# Patient Record
Sex: MALE | Race: WHITE | NOT HISPANIC OR LATINO | Employment: UNEMPLOYED | URBAN - METROPOLITAN AREA
[De-identification: names, ages, dates, MRNs, and addresses within clinical notes are randomized per-mention and may not be internally consistent; named-entity substitution may affect disease eponyms.]

---
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
## 2018-01-02 ENCOUNTER — TELEPHONE (OUTPATIENT)
Dept: SURGERY | Facility: CLINIC | Age: 18
End: 2018-01-02

## 2018-01-02 ENCOUNTER — DOCUMENTATION ONLY (OUTPATIENT)
Dept: REHABILITATION | Facility: HOSPITAL | Age: 18
End: 2018-01-02

## 2018-01-02 ENCOUNTER — ANESTHESIA EVENT (OUTPATIENT)
Dept: SURGERY | Facility: HOSPITAL | Age: 18
End: 2018-01-02
Payer: COMMERCIAL

## 2018-01-02 NOTE — PT/OT/SLP DISCHARGE
Physician: Dr. Ponce  Diagnosis: L shoulder stiffness         Orders:  Physical Therapy evaluate and treat  Date of eval: 11/28/2017     Pt only attended 1 visit, did not r/t additional visits due to other med issues. Unable to assess if goals met.     Functional Limitations Reports - G Codes  Category: carrying, moving, and handling objects  Tool: FOTO  Score: 47  Score interpretation is as follows:     Goal/ : 27  Discharge/ :  53        TREATMENT:  No group exercise performed.  Pt was provided with a written copy of exercises to perform as tolerated at home.  Pt performed rom and strengthening ex for neck/ L shoulder  including:      Manual cerv traction 4 min   scap ret 5x ( ant shoulder pain)  Prone ext 10x "  Prone ha 10x "  Prone rows "  Sl er 10x "  Mr sleeper 20 sec x 3  Pendulum hangs 1 min     Ther ex time: 15 min  Exercises were reviewed and pt was able to demonstrate them prior to the end of the session.      Modalities:  Pt signed written consent to dry needling Rx.  Pt gave verbal consent for DN.    DN Time: 10 min  Pt rec'd dry needling to B  C 3 - 1 in  L upper trap 1 in  X 2  L supraspinatus 1 in   L post shoulder 1 in  L pec 1/2 in  ( ant axilla)  L subscap 1/2 in      needles for range of motion, decrease pain with no adverse effects.     Pt   was provided educational information, including: co 10 min prn, ex as keven  HEP- pendulum  Hangs, mr sleeper stretch, prone ha as keven         GOALS:  12_   weeks.   1. Independent with HEP.  2. Report decreased   L  shoulder   pain  <   / =  3  /10 with adls such as playing basketball, getting dressed, driving  3. Increased MMT  for  L UE to 5/5    4. Increased arom  for  L shoulder abd/ flex to > 130  5. Improved shoulder FOTO score carrying score to 27%

## 2018-01-02 NOTE — TELEPHONE ENCOUNTER
Informed pt's mother of sx arrival time wash with antibacterial soap and too arrive on the 2nd floor day of sx center

## 2018-01-02 NOTE — ANESTHESIA PREPROCEDURE EVALUATION
01/02/2018  Pre-operative evaluation for Procedure(s) (LRB):  CHOLECYSTECTOMY-LAPAROSCOPIC (N/A)    Arian Farris is a 17 y.o. male with no significant PMH who is scheduled for above procedure. He's been having RUQ pain and 10 pound weight loss.     Prev airway: None on file    Patient Active Problem List   Diagnosis    Left shoulder pain    Right upper quadrant pain    Abdominal pain       Review of patient's allergies indicates:  No Known Allergies     No current facility-administered medications on file prior to encounter.      No current outpatient prescriptions on file prior to encounter.       Past Surgical History:   Procedure Laterality Date    ADENOIDECTOMY      TONSILLECTOMY         Social History     Social History    Marital status: Single     Spouse name: N/A    Number of children: N/A    Years of education: N/A     Occupational History    Not on file.     Social History Main Topics    Smoking status: Passive Smoke Exposure - Never Smoker    Smokeless tobacco: Never Used      Comment: Dad smokes outside    Alcohol use No    Drug use: No    Sexual activity: Not on file     Other Topics Concern    Not on file     Social History Narrative    In the 10th grade, doing well         Vital Signs Range (Last 24H):         CBC: No results for input(s): WBC, RBC, HGB, HCT, PLT, MCV, MCH, MCHC in the last 72 hours.    CMP: No results for input(s): NA, K, CL, CO2, BUN, CREATININE, GLU, MG, PHOS, CALCIUM, ALBUMIN, PROT, ALKPHOS, ALT, AST, BILITOT in the last 72 hours.    INR  No results for input(s): PT, INR, PROTIME, APTT in the last 72 hours.      EKG: None        2D Echo: None          Anesthesia Evaluation         Review of Systems  Anesthesia Hx:  No problems with previous Anesthesia   Social:  Non-Smoker, Alcohol Use    Hematology/Oncology:  Hematology Normal   Oncology Normal      Cardiovascular:  Cardiovascular Normal     Pulmonary:  Pulmonary Normal  Denies COPD.  Denies Asthma.  Denies Shortness of breath.    Renal/:   Denies Chronic Renal Disease.     Hepatic/GI:   Denies Liver Disease.    Neurological:   Denies Seizures.    Endocrine:   Denies Diabetes.        Physical Exam  General:  Well nourished    Airway/Jaw/Neck:  Airway Findings: Mouth Opening: Normal Tongue: Normal  General Airway Assessment: Adult, Average  Mallampati: II  TM Distance: Normal, at least 6 cm  Jaw/Neck Findings:  Neck ROM: Normal ROM       Chest/Lungs:  Chest/Lungs Findings: Clear to auscultation     Heart/Vascular:  Heart Findings: Rate: Normal  Rhythm: Regular Rhythm  Sounds: Normal        Mental Status:  Mental Status Findings:  Cooperative, Alert and Oriented         Anesthesia Plan  Type of Anesthesia, risks & benefits discussed:  Anesthesia Type:  general  Patient's Preference:   Intra-op Monitoring Plan:   Intra-op Monitoring Plan Comments:   Post Op Pain Control Plan:   Post Op Pain Control Plan Comments: As per surgeon's plan  Induction:   IV  Beta Blocker:  Patient is not currently on a Beta-Blocker (No further documentation required).       Informed Consent: Patient understands risks and agrees with Anesthesia plan.  Questions answered. Anesthesia consent signed with patient.  ASA Score: 1     Day of Surgery Review of History & Physical:    H&P update referred to the surgeon.         Ready For Surgery From Anesthesia Perspective.

## 2018-01-03 ENCOUNTER — ANESTHESIA (OUTPATIENT)
Dept: SURGERY | Facility: HOSPITAL | Age: 18
End: 2018-01-03
Payer: COMMERCIAL

## 2018-01-03 ENCOUNTER — SURGERY (OUTPATIENT)
Age: 18
End: 2018-01-03

## 2018-01-03 ENCOUNTER — HOSPITAL ENCOUNTER (OUTPATIENT)
Facility: HOSPITAL | Age: 18
Discharge: HOME | End: 2018-01-03
Attending: SURGERY | Admitting: SURGERY
Payer: COMMERCIAL

## 2018-01-03 VITALS
BODY MASS INDEX: 20.3 KG/M2 | HEIGHT: 71 IN | TEMPERATURE: 99 F | HEART RATE: 78 BPM | DIASTOLIC BLOOD PRESSURE: 77 MMHG | SYSTOLIC BLOOD PRESSURE: 118 MMHG | WEIGHT: 145 LBS | RESPIRATION RATE: 18 BRPM | OXYGEN SATURATION: 100 %

## 2018-01-03 DIAGNOSIS — R10.11 RIGHT UPPER QUADRANT PAIN: Primary | ICD-10-CM

## 2018-01-03 DIAGNOSIS — K80.20 GALLSTONES: ICD-10-CM

## 2018-01-03 DIAGNOSIS — R10.11 RUQ ABDOMINAL PAIN: Primary | ICD-10-CM

## 2018-01-03 PROCEDURE — 25000003 PHARM REV CODE 250: Performed by: STUDENT IN AN ORGANIZED HEALTH CARE EDUCATION/TRAINING PROGRAM

## 2018-01-03 PROCEDURE — 25000003 PHARM REV CODE 250: Performed by: ANESTHESIOLOGY

## 2018-01-03 PROCEDURE — 63600175 PHARM REV CODE 636 W HCPCS: Performed by: SURGERY

## 2018-01-03 PROCEDURE — 71000044 HC DOSC ROUTINE RECOVERY FIRST HOUR: Performed by: SURGERY

## 2018-01-03 PROCEDURE — 27201423 OPTIME MED/SURG SUP & DEVICES STERILE SUPPLY: Performed by: SURGERY

## 2018-01-03 PROCEDURE — 71000015 HC POSTOP RECOV 1ST HR: Performed by: SURGERY

## 2018-01-03 PROCEDURE — D9220A PRA ANESTHESIA: Mod: CRNA,,, | Performed by: NURSE ANESTHETIST, CERTIFIED REGISTERED

## 2018-01-03 PROCEDURE — 63600175 PHARM REV CODE 636 W HCPCS: Performed by: NURSE ANESTHETIST, CERTIFIED REGISTERED

## 2018-01-03 PROCEDURE — 36000709 HC OR TIME LEV III EA ADD 15 MIN: Performed by: SURGERY

## 2018-01-03 PROCEDURE — 37000008 HC ANESTHESIA 1ST 15 MINUTES: Performed by: SURGERY

## 2018-01-03 PROCEDURE — 25000003 PHARM REV CODE 250: Performed by: SURGERY

## 2018-01-03 PROCEDURE — 25000003 PHARM REV CODE 250: Performed by: NURSE ANESTHETIST, CERTIFIED REGISTERED

## 2018-01-03 PROCEDURE — 47562 LAPAROSCOPIC CHOLECYSTECTOMY: CPT | Mod: ,,, | Performed by: SURGERY

## 2018-01-03 PROCEDURE — 88304 TISSUE EXAM BY PATHOLOGIST: CPT | Mod: 26,,,

## 2018-01-03 PROCEDURE — 36000708 HC OR TIME LEV III 1ST 15 MIN: Performed by: SURGERY

## 2018-01-03 PROCEDURE — D9220A PRA ANESTHESIA: Mod: ANES,,, | Performed by: ANESTHESIOLOGY

## 2018-01-03 PROCEDURE — 37000009 HC ANESTHESIA EA ADD 15 MINS: Performed by: SURGERY

## 2018-01-03 PROCEDURE — 88304 TISSUE EXAM BY PATHOLOGIST: CPT

## 2018-01-03 RX ORDER — HYDROCODONE BITARTRATE AND ACETAMINOPHEN 5; 325 MG/1; MG/1
1 TABLET ORAL EVERY 4 HOURS PRN
Status: DISCONTINUED | OUTPATIENT
Start: 2018-01-03 | End: 2018-01-03 | Stop reason: HOSPADM

## 2018-01-03 RX ORDER — NEOSTIGMINE METHYLSULFATE 1 MG/ML
INJECTION, SOLUTION INTRAVENOUS
Status: DISCONTINUED | OUTPATIENT
Start: 2018-01-03 | End: 2018-01-03

## 2018-01-03 RX ORDER — SODIUM CHLORIDE 0.9 % (FLUSH) 0.9 %
3 SYRINGE (ML) INJECTION
Status: DISCONTINUED | OUTPATIENT
Start: 2018-01-03 | End: 2018-01-03 | Stop reason: HOSPADM

## 2018-01-03 RX ORDER — BUPIVACAINE HYDROCHLORIDE 2.5 MG/ML
INJECTION, SOLUTION EPIDURAL; INFILTRATION; INTRACAUDAL
Status: DISCONTINUED | OUTPATIENT
Start: 2018-01-03 | End: 2018-01-03 | Stop reason: HOSPADM

## 2018-01-03 RX ORDER — FENTANYL CITRATE 50 UG/ML
INJECTION, SOLUTION INTRAMUSCULAR; INTRAVENOUS
Status: DISCONTINUED | OUTPATIENT
Start: 2018-01-03 | End: 2018-01-03

## 2018-01-03 RX ORDER — ONDANSETRON 8 MG/1
8 TABLET, ORALLY DISINTEGRATING ORAL ONCE
Status: COMPLETED | OUTPATIENT
Start: 2018-01-03 | End: 2018-01-03

## 2018-01-03 RX ORDER — KETOROLAC TROMETHAMINE 30 MG/ML
INJECTION, SOLUTION INTRAMUSCULAR; INTRAVENOUS
Status: DISCONTINUED | OUTPATIENT
Start: 2018-01-03 | End: 2018-01-03

## 2018-01-03 RX ORDER — MIDAZOLAM HYDROCHLORIDE 1 MG/ML
INJECTION, SOLUTION INTRAMUSCULAR; INTRAVENOUS
Status: DISCONTINUED | OUTPATIENT
Start: 2018-01-03 | End: 2018-01-03

## 2018-01-03 RX ORDER — PROPOFOL 10 MG/ML
VIAL (ML) INTRAVENOUS
Status: DISCONTINUED | OUTPATIENT
Start: 2018-01-03 | End: 2018-01-03

## 2018-01-03 RX ORDER — MEPERIDINE HYDROCHLORIDE 50 MG/ML
12.5 INJECTION INTRAMUSCULAR; INTRAVENOUS; SUBCUTANEOUS EVERY 10 MIN PRN
Status: DISCONTINUED | OUTPATIENT
Start: 2018-01-03 | End: 2018-01-03 | Stop reason: HOSPADM

## 2018-01-03 RX ORDER — HYDROCODONE BITARTRATE AND ACETAMINOPHEN 5; 325 MG/1; MG/1
1 TABLET ORAL EVERY 6 HOURS PRN
Qty: 28 TABLET | Refills: 0 | Status: SHIPPED | OUTPATIENT
Start: 2018-01-03 | End: 2018-01-22

## 2018-01-03 RX ORDER — LIDOCAINE HCL/PF 100 MG/5ML
SYRINGE (ML) INTRAVENOUS
Status: DISCONTINUED | OUTPATIENT
Start: 2018-01-03 | End: 2018-01-03

## 2018-01-03 RX ORDER — ONDANSETRON 2 MG/ML
4 INJECTION INTRAMUSCULAR; INTRAVENOUS EVERY 12 HOURS PRN
Status: DISCONTINUED | OUTPATIENT
Start: 2018-01-03 | End: 2018-01-03 | Stop reason: HOSPADM

## 2018-01-03 RX ORDER — ROCURONIUM BROMIDE 10 MG/ML
INJECTION, SOLUTION INTRAVENOUS
Status: DISCONTINUED | OUTPATIENT
Start: 2018-01-03 | End: 2018-01-03

## 2018-01-03 RX ORDER — ONDANSETRON 2 MG/ML
INJECTION INTRAMUSCULAR; INTRAVENOUS
Status: DISCONTINUED | OUTPATIENT
Start: 2018-01-03 | End: 2018-01-03

## 2018-01-03 RX ORDER — CEFAZOLIN SODIUM 1 G/3ML
2 INJECTION, POWDER, FOR SOLUTION INTRAMUSCULAR; INTRAVENOUS
Status: COMPLETED | OUTPATIENT
Start: 2018-01-03 | End: 2018-01-03

## 2018-01-03 RX ORDER — ACETAMINOPHEN 10 MG/ML
INJECTION, SOLUTION INTRAVENOUS
Status: DISCONTINUED | OUTPATIENT
Start: 2018-01-03 | End: 2018-01-03

## 2018-01-03 RX ORDER — SODIUM CHLORIDE 9 MG/ML
INJECTION, SOLUTION INTRAVENOUS CONTINUOUS
Status: DISCONTINUED | OUTPATIENT
Start: 2018-01-03 | End: 2018-01-03 | Stop reason: HOSPADM

## 2018-01-03 RX ORDER — HYDROMORPHONE HYDROCHLORIDE 2 MG/ML
0.2 INJECTION, SOLUTION INTRAMUSCULAR; INTRAVENOUS; SUBCUTANEOUS EVERY 5 MIN PRN
Status: DISCONTINUED | OUTPATIENT
Start: 2018-01-03 | End: 2018-01-03 | Stop reason: HOSPADM

## 2018-01-03 RX ORDER — GLYCOPYRROLATE 0.2 MG/ML
INJECTION INTRAMUSCULAR; INTRAVENOUS
Status: DISCONTINUED | OUTPATIENT
Start: 2018-01-03 | End: 2018-01-03

## 2018-01-03 RX ADMIN — NEOSTIGMINE METHYLSULFATE 4 MG: 1 INJECTION INTRAVENOUS at 01:01

## 2018-01-03 RX ADMIN — KETOROLAC TROMETHAMINE 30 MG: 30 INJECTION, SOLUTION INTRAMUSCULAR; INTRAVENOUS at 01:01

## 2018-01-03 RX ADMIN — LIDOCAINE HYDROCHLORIDE 100 MG: 20 INJECTION, SOLUTION INTRAVENOUS at 12:01

## 2018-01-03 RX ADMIN — ACETAMINOPHEN 1000 MG: 10 INJECTION, SOLUTION INTRAVENOUS at 01:01

## 2018-01-03 RX ADMIN — GLYCOPYRROLATE 0.4 MG: 0.2 INJECTION, SOLUTION INTRAMUSCULAR; INTRAVENOUS at 01:01

## 2018-01-03 RX ADMIN — ONDANSETRON 8 MG: 8 TABLET, ORALLY DISINTEGRATING ORAL at 03:01

## 2018-01-03 RX ADMIN — HYDROCODONE BITARTRATE AND ACETAMINOPHEN 1 TABLET: 5; 325 TABLET ORAL at 02:01

## 2018-01-03 RX ADMIN — BUPIVACAINE HYDROCHLORIDE 6 ML: 2.5 INJECTION, SOLUTION EPIDURAL; INFILTRATION; INTRACAUDAL; PERINEURAL at 01:01

## 2018-01-03 RX ADMIN — MIDAZOLAM HYDROCHLORIDE 2 MG: 1 INJECTION, SOLUTION INTRAMUSCULAR; INTRAVENOUS at 12:01

## 2018-01-03 RX ADMIN — SODIUM CHLORIDE, SODIUM GLUCONATE, SODIUM ACETATE, POTASSIUM CHLORIDE, MAGNESIUM CHLORIDE, SODIUM PHOSPHATE, DIBASIC, AND POTASSIUM PHOSPHATE: .53; .5; .37; .037; .03; .012; .00082 INJECTION, SOLUTION INTRAVENOUS at 01:01

## 2018-01-03 RX ADMIN — FENTANYL CITRATE 100 MCG: 50 INJECTION, SOLUTION INTRAMUSCULAR; INTRAVENOUS at 12:01

## 2018-01-03 RX ADMIN — PROPOFOL 200 MG: 10 INJECTION, EMULSION INTRAVENOUS at 12:01

## 2018-01-03 RX ADMIN — SODIUM CHLORIDE: 0.9 INJECTION, SOLUTION INTRAVENOUS at 12:01

## 2018-01-03 RX ADMIN — ONDANSETRON 4 MG: 2 INJECTION INTRAMUSCULAR; INTRAVENOUS at 01:01

## 2018-01-03 RX ADMIN — CEFAZOLIN 2 G: 330 INJECTION, POWDER, FOR SOLUTION INTRAMUSCULAR; INTRAVENOUS at 12:01

## 2018-01-03 RX ADMIN — ROCURONIUM BROMIDE 35 MG: 10 INJECTION, SOLUTION INTRAVENOUS at 12:01

## 2018-01-03 NOTE — TRANSFER OF CARE
"Anesthesia Transfer of Care Note    Patient: Arian Farris    Procedure(s) Performed: Procedure(s) (LRB):  CHOLECYSTECTOMY-LAPAROSCOPIC (N/A)    Patient location: PACU    Anesthesia Type: general    Transport from OR: Transported from OR on 6-10 L/min O2 by face mask with adequate spontaneous ventilation    Post pain: adequate analgesia    Post assessment: no apparent anesthetic complications    Post vital signs: stable    Level of consciousness: awake    Nausea/Vomiting: no nausea/vomiting    Complications: none    Transfer of care protocol was followed      Last vitals:   Visit Vitals  /78   Pulse 60   Temp 36.8 °C (98.2 °F) (Skin)   Resp 18   Ht 5' 11" (1.803 m)   Wt 65.8 kg (145 lb)   SpO2 100%   BMI 20.22 kg/m²     "

## 2018-01-03 NOTE — INTERVAL H&P NOTE
The patient has been examined and the H&P has been reviewed:    I concur with the findings and no changes have occurred since H&P was written.     No u/s but will continue with lap michelle based on the relatively low cckhida.    Anesthesia/Surgery risks, benefits and alternative options discussed and understood by patient/family.          There are no hospital problems to display for this patient.

## 2018-01-03 NOTE — BRIEF OP NOTE
Operative Note       Surgery Date: 1/3/2018     Surgeon(s) and Role:     * Elio Lam MD - Primary     * Bruce Wasserman MD - Resident - Assisting    Pre-op Diagnosis:  Biliary colic [K80.50]    Post-op Diagnosis:  Biliary colic [K80.50]    Procedure(s) (LRB):  CHOLECYSTECTOMY-LAPAROSCOPIC (N/A)    Anesthesia: General    Procedure in Detail/Findings:  [place] without apparent complication, unopened    Estimated Blood Loss: Minimal           Specimens     Start     Ordered    01/03/18 1317  Specimen to Pathology - Surgery  Once      01/03/18 1317        Implants: * No implants in log *           Disposition: PACU - hemodynamically stable.           Condition: Good    Attestation:  I was present and scrubbed for the entire procedure.

## 2018-01-03 NOTE — PROGRESS NOTES
Patient AAOx4, VSS, family at bedside. Mother taking all belongings with her. Patient denies pain or nausea at this time.

## 2018-01-03 NOTE — PLAN OF CARE
Patient and parents state they are ready to be discharged. Instructions and narcotic prescription given to patient and family. Both verbalize understanding. Patient tolerating po liquids with no difficulty. Patient states pain is at a tolerable level for them. Anesthesia consent and surgical consent in chart upon patient's discharge from [facility].

## 2018-01-03 NOTE — H&P (VIEW-ONLY)
History & Physical    SUBJECTIVE:     History of Present Illness:  Patient is a 17 y.o. male presents with right upper quadrant pain.  The pain started December 2 in the ruq, was sharp/stabbing and non-radiating with no inciting event and was associated with nausea and vomiting.  He went to the ER at that time.  It was improved with GI cocktail.  Since then he has been pain free but has bloating and due to that has not eaten much and has lost 10 pounds.      Chief Complaint   Patient presents with    Gall Bladder Problem       Review of patient's allergies indicates:  No Known Allergies    Current Outpatient Prescriptions   Medication Sig Dispense Refill    meloxicam (MOBIC) 15 MG tablet Take 1 tablet (15 mg total) by mouth once daily. 30 tablet 0    omeprazole (PRILOSEC OTC) 20 MG tablet Take 1 tablet (20 mg total) by mouth once daily. 60 tablet 1    omeprazole (PRILOSEC) 40 MG capsule Take 1 capsule (40 mg total) by mouth once daily. 14 capsule 0     No current facility-administered medications for this visit.        Past Medical History:   Diagnosis Date    Abdominal migraine     on Topomax for about one year    Eczema     Migraines      Past Surgical History:   Procedure Laterality Date    ADENOIDECTOMY      TONSILLECTOMY       Family History   Problem Relation Age of Onset    Other Mother      Hypoglycemia    Heart disease Mother      Mitral valve prolapse    Migraines Mother     Migraines Sister     Hypertension Maternal Grandmother     Diabetes Maternal Grandmother     Migraines Maternal Grandmother     Hypertension Maternal Grandfather     Diabetes Maternal Grandfather     Hypertension Paternal Grandmother     Diabetes Paternal Grandmother     Hypertension Paternal Grandfather     Diabetes Paternal Grandfather     Heart disease Paternal Grandfather     Hyperlipidemia Paternal Grandfather     Short stature Neg Hx     Thyroid disease Neg Hx      Social History   Substance Use Topics  "   Smoking status: Passive Smoke Exposure - Never Smoker    Smokeless tobacco: Never Used      Comment: Dad smokes outside    Alcohol use No        Review of Systems:  Review of Systems   Constitutional: Positive for unexpected weight change. Negative for fever.   Respiratory: Negative for cough, chest tightness and shortness of breath.    Cardiovascular: Negative for chest pain.   Gastrointestinal: Negative for constipation and diarrhea.   Genitourinary: Negative for difficulty urinating and dysuria.   Skin: Negative for rash and wound.   Neurological: Negative for seizures and headaches.   Hematological: Does not bruise/bleed easily.       OBJECTIVE:     Vital Signs (Most Recent)  Temp: 98.5 °F (36.9 °C) (12/19/17 0742)  Pulse: 80 (12/19/17 0742)  BP: (!) 146/75 (12/19/17 0742)  5' 11" (1.803 m)  64.4 kg (142 lb)     Physical Exam:  Physical Exam   Constitutional: He is oriented to person, place, and time. He appears well-developed and well-nourished.   Neck: Normal range of motion. Neck supple.   Cardiovascular: Normal rate, regular rhythm and normal heart sounds.    Pulmonary/Chest: Effort normal and breath sounds normal.   Abdominal: Soft. Normal appearance. There is tenderness in the right upper quadrant. There is no rigidity and no guarding.   Neurological: He is alert and oriented to person, place, and time.   Skin: Skin is warm and dry.   Psychiatric: He has a normal mood and affect. His behavior is normal. Judgment and thought content normal.   Vitals reviewed.      Laboratory  CBC: Reviewed  CMP: Reviewed    Diagnostic Results:  CCKHIDA: Reviewed   (basically normal due to EF 40%)  ASSESSMENT/PLAN:     RUQ pain and bloating associated with weight loss.    PLAN:Plan     Obtain u/s from [facility].  Obtain ct and egd.       "

## 2018-01-03 NOTE — ANESTHESIA RELEASE NOTE
"Anesthesia Release from PACU Note    Patient: Arian Farris    Procedure(s) Performed: Procedure(s) (LRB):  CHOLECYSTECTOMY-LAPAROSCOPIC (N/A)    Anesthesia type: GEN    Post pain: Adequate analgesia reported    Post assessment: no apparent anesthetic complications, tolerated procedure well and no evidence of recall    Post vital signs: /76   Pulse 76   Temp 36.8 °C (98.2 °F) (Skin)   Resp 18   Ht 5' 11" (1.803 m)   Wt 65.8 kg (145 lb)   SpO2 100%   BMI 20.22 kg/m²     Level of consciousness: awake, alert and oriented    Nausea/Vomiting: no nausea/no vomiting    Complications: none    Airway Patency: patent    Respiratory: unassisted, spontaneous ventilation, room air    Cardiovascular: stable and blood pressure at baseline    Hydration: euvolemic    "

## 2018-01-03 NOTE — DISCHARGE INSTRUCTIONS
Cholecystectomy     Clips close off the duct connecting the gallbladder to the bile duct. The gallbladder is then removed.     Youve had painful attacks caused by gallstones. To treat the problem, your healthcare provider wants to remove your gallbladder. This surgery is called cholecystectomy. Removing the gallbladder can relieve pain. It will also prevent future attacks. You can live a healthy life without your gallbladder. You may also be able to go back to eating foods you enjoyed before your gallbladder problems started.  Before your surgery  Be prepared:  · Tell your provider what medicines you take. Include those bought over the counter. Also include herbs or supplements. Be sure to mention if you take prescription blood thinners. This includes warfarin, clopidogrel, and aspirin.  · Have any tests your provider asks for, such as blood tests.  · Dont eat or drink after midnight, the night before your surgery. This includes water, coffee, and mints. However, you may need to take some medicine with sips of water--talk with your healthcare provider.  The day of surgery  When you arrive, you will prepare for surgery:  · An IV line will be put into a vein in your arm or hand. This gives you fluids and medicine.  · An anesthesiologist will talk with you about anesthesia. This is medicine used to prevent pain. You will receive general anesthesia. This puts you into a state like deep sleep through the procedure.  During surgery  There are 2 methods for removing the gallbladder. Your healthcare provider will choose which method is best for you:  · Laparoscopic cholecystectomy. This is most common. During surgery, 2 to 4 small incisions are made. A thin tube with a camera is used. This is called a laparoscope. The scope is put through one of the incisions. It sends images to a video screen. Surgical tools are put through other incisions. The gallbladder is removed using the scope and these tools.  · Open  cholecystectomy. One larger incision is made. The surgeon sees and works through this incision. Open surgery is most often used when scarring or other factors make it a better choice for you.  In some cases, safety requires a change from laparoscopic to open surgery during the procedure.  After surgery  You will be sent to a room to wake up from the anesthesia. You will likely go home the same day. In some cases, an overnight stay is needed. If you had open cholecystectomy, you may need to stay in the hospital for a few days. When you are released to go home, have a family member or friend ready to drive you.  Risks and possible complications of gallbladder surgery  All surgeries have risks. The risks of gallbladder surgery include:  · Bleeding  · Infection  · Injury to the common bile duct or nearby organs  · Blood clots in the legs  · Bile leaks  · Hernia at incision site  · Pnemonia   Date Last Reviewed: 7/1/2016  © 2179-8615 [employer]. [address] [place] [address]. All rights reserved. This information is not intended as a substitute for professional medical care. Always follow your healthcare professional's instructions.      Recovery After Procedural Sedation (Child)  Your child was given medicine to get ready for a procedure. This may have included both a pain medicine and a sleeping medicine. Most of the effects will wear off before your child goes home. But drowsiness may continue for the first 6 to 8 hours after the procedure.  Home care  Follow these guidelines after your child returns home:  · Watch your child closely for the first 12 to 24 hours after the procedure. Dont leave your child alone in the bath or near water. Don't let your child skateboard, skate, or ride a bicycle until he or she is fully alert and has normal balance. This is to help prevent injuries.  · Its OK to let your child sleep. But always ask your child's healthcare provider how often you should wake  your child. When you wake your child, check for the signs in When to seek medical advice (below).  · Dont give your child any medicine during the first 4 hours after the procedure unless your child's healthcare provider tells you to. Certain medicines such as those for pain or cold relief might react with the medicines your child was given in the hospital. This can cause a much stronger response than usual.  · If your child is old enough to drive, don't allow him or her to drive for at least 24 hours. Your child should also not make any important business or personal decisions during this time.  Follow-up care  Follow up with your child's healthcare provider, or as advised. Call your child's healthcare provider if you have any concerns about how your child is breathing. Also call your child's healthcare provider if you are concerned about your child's reaction to the procedure or medicine.  When to seek medical advice  Call your child's healthcare provider right away if any of these occur:  · Drowsiness that gets worse  · Unable to wake your child as usual  · Weakness or dizziness  · Cough  · Fast breathing. One breath is counted each time your child breathes in and out.  ¨ For  to 6 weeks old, more than 60 breaths per minute  ¨ For a child 6 weeks to 2 years, more than 45 breaths per minute  ¨ For a child 3 to 6 years old, more than 35 breaths per minute  ¨ For a child 7 to 10 years old, more than 30 breaths per minute  ¨ For a child older than 10, more than 25 breaths per minute  · Slow breathing:  ¨ For  to 6 weeks old, fewer than 25 breaths per minute  ¨ For a child 6 weeks to 1 year, fewer than 20 breaths per minute  ¨ For a child 1 to 3 years old, fewer than 18 breaths per minute  ¨ For a child 4 to 6 years old, fewer than 16 breaths per minute  ¨ For a child 7 to 9 years old, fewer than 14 breaths per minute  ¨ For a child 10 to 14 years old, fewer than 12 breaths per minute  ¨ For a child  older than 14, fewer than 10 breaths per minute  Date Last Reviewed: 10/1/2016  © 3451-7437 [employer]. [address] [place] [address], [place] [address]. All rights reserved. This information is not intended as a substitute for professional medical care. Always follow your healthcare professional's instructions.

## 2018-01-03 NOTE — ANESTHESIA POSTPROCEDURE EVALUATION
"Anesthesia Post Evaluation    Patient: Arian Farris    Procedure(s) Performed: Procedure(s) (LRB):  CHOLECYSTECTOMY-LAPAROSCOPIC (N/A)    Final Anesthesia Type: general  Patient location during evaluation: PACU  Patient participation: Yes- Able to Participate  Level of consciousness: awake and alert and oriented  Post-procedure vital signs: reviewed and stable  Pain management: adequate  Airway patency: patent  PONV status at discharge: No PONV  Anesthetic complications: no      Cardiovascular status: stable  Respiratory status: unassisted, spontaneous ventilation and room air  Hydration status: euvolemic  Follow-up not needed.        Visit Vitals  /76   Pulse 76   Temp 36.8 °C (98.2 °F) (Skin)   Resp 18   Ht 5' 11" (1.803 m)   Wt 65.8 kg (145 lb)   SpO2 100%   BMI 20.22 kg/m²       Pain/Carlos Score: Pain Assessment Performed: Yes (1/3/2018  1:35 PM)  Presence of Pain: denies (1/3/2018  1:35 PM)  Carlos Score: 9 (1/3/2018  1:35 PM)      "

## 2018-01-22 ENCOUNTER — OFFICE VISIT (OUTPATIENT)
Dept: OTOLARYNGOLOGY | Facility: CLINIC | Age: 18
End: 2018-01-22
Payer: COMMERCIAL

## 2018-01-22 VITALS
TEMPERATURE: 98 F | SYSTOLIC BLOOD PRESSURE: 135 MMHG | BODY MASS INDEX: 19.71 KG/M2 | HEART RATE: 92 BPM | DIASTOLIC BLOOD PRESSURE: 87 MMHG | WEIGHT: 141.31 LBS

## 2018-01-22 DIAGNOSIS — R04.0 EPISTAXIS: Primary | ICD-10-CM

## 2018-01-22 PROCEDURE — 99999 PR PBB SHADOW E&M-EST. PATIENT-LVL II: CPT | Mod: PBBFAC,,, | Performed by: PHYSICIAN ASSISTANT

## 2018-01-22 PROCEDURE — 99203 OFFICE O/P NEW LOW 30 MIN: CPT | Mod: S$GLB,,, | Performed by: PHYSICIAN ASSISTANT

## 2018-01-22 NOTE — PROGRESS NOTES
Subjective:       Patient ID: Arian Farris is a 17 y.o. male.    Chief Complaint: Epistaxis    Mr. Farris is a very pleasant 16 yo male coming to see me today for the first time for recurrent nosebleeds. He states that he has been having episodes x 2 months with last time being 2 days ago. He denies any runny nose, congestion or itchy watery eyes. However, he does state that he has had allergy problems in the past. He denies any h/o blood disorders. He denies any medications other tan occasional saline nose spray.      Review of Systems   Constitutional: Negative for fatigue, fever and unexpected weight change.   HENT: Positive for nosebleeds. Negative for congestion, ear discharge, ear pain, facial swelling, hearing loss, postnasal drip, rhinorrhea, sinus pressure, sneezing, sore throat, tinnitus, trouble swallowing and voice change.    Eyes: Negative for discharge, redness and itching.   Respiratory: Negative for cough, choking, shortness of breath and wheezing.    Cardiovascular: Negative for chest pain and palpitations.   Gastrointestinal: Negative for abdominal pain.        No reflux.   Musculoskeletal: Negative for neck pain.   Neurological: Negative for dizziness, facial asymmetry, light-headedness and headaches.   Hematological: Negative for adenopathy. Does not bruise/bleed easily.   Psychiatric/Behavioral: Negative for agitation, behavioral problems, confusion and decreased concentration.       Objective:      Physical Exam   Constitutional: He is oriented to person, place, and time. Vital signs are normal. He appears well-developed and well-nourished. No distress.   HENT:   Head: Normocephalic and atraumatic.   Right Ear: Hearing, tympanic membrane, external ear and ear canal normal.   Left Ear: Hearing, tympanic membrane, external ear and ear canal normal.   Nose: Nose normal. No mucosal edema, rhinorrhea, nasal deformity or septal deviation.   Mouth/Throat: Uvula is midline, oropharynx is clear and  moist and mucous membranes are normal. No trismus in the jaw. Normal dentition. No uvula swelling. No oropharyngeal exudate or posterior oropharyngeal edema.   Eyes: Conjunctivae and EOM are normal. Pupils are equal, round, and reactive to light. Right eye exhibits no chemosis. Left eye exhibits no chemosis. Right conjunctiva is not injected. Left conjunctiva is not injected. No scleral icterus.   Neck: Trachea normal and phonation normal. No tracheal tenderness present. No tracheal deviation present. No thyroid mass and no thyromegaly present.   Cardiovascular: Intact distal pulses.    Pulmonary/Chest: Effort normal. No accessory muscle usage or stridor. No respiratory distress.   Lymphadenopathy:        Head (right side): No submental, no submandibular, no preauricular and no posterior auricular adenopathy present.        Head (left side): No submental, no submandibular, no preauricular and no posterior auricular adenopathy present.     He has no cervical adenopathy.        Right cervical: No superficial cervical and no deep cervical adenopathy present.       Left cervical: No superficial cervical and no deep cervical adenopathy present.   Neurological: He is alert and oriented to person, place, and time. No cranial nerve deficit.   Skin: Skin is warm and dry. No rash noted. No erythema.   Psychiatric: He has a normal mood and affect. His behavior is normal. Thought content normal.       Assessment:       1. Epistaxis        Plan:       1. Epistaxis: Nose Bleed Instructions:  We had a long discussion regarding the importance of nasal moisture, and the use of a nasal saline spray or gel into nose four times daily to keep moist.    Do not sleep or sit for long periods of time under a ceiling fan or other source of aggressive airflow.  Use a humidifier in bedroom or any room in your home you spend long periods of time.  Engage in only light activity. No strenuous activity. No heavy lifting or straining.   No bending  over at the hips. Keep nose above your heart at all times.  Sneeze with an open mouth to reduce pressure from nose.   Avoid foods or drinks hot in temperature for at least 48 hours then progress slowly.  Avoid hot steamy showers or baths for one week. No signs on exam today of recurrent bleeding. RTC prn and may need a scope. I also discussed starting flonase daily to prevent nasal allergy symptoms that he has had in the past.

## 2018-02-01 ENCOUNTER — OFFICE VISIT (OUTPATIENT)
Dept: ORTHOPEDICS | Facility: CLINIC | Age: 18
End: 2018-02-01
Payer: COMMERCIAL

## 2018-02-01 ENCOUNTER — HOSPITAL ENCOUNTER (OUTPATIENT)
Dept: RADIOLOGY | Facility: HOSPITAL | Age: 18
Discharge: HOME | End: 2018-02-01
Attending: PHYSICIAN ASSISTANT
Payer: COMMERCIAL

## 2018-02-01 VITALS
WEIGHT: 141.31 LBS | DIASTOLIC BLOOD PRESSURE: 91 MMHG | SYSTOLIC BLOOD PRESSURE: 137 MMHG | BODY MASS INDEX: 19.78 KG/M2 | HEART RATE: 94 BPM | HEIGHT: 71 IN

## 2018-02-01 DIAGNOSIS — M79.672 LEFT FOOT PAIN: Primary | ICD-10-CM

## 2018-02-01 DIAGNOSIS — S93.602A FOOT SPRAIN, LEFT, INITIAL ENCOUNTER: Primary | ICD-10-CM

## 2018-02-01 DIAGNOSIS — M79.672 LEFT FOOT PAIN: ICD-10-CM

## 2018-02-01 PROCEDURE — 99204 OFFICE O/P NEW MOD 45 MIN: CPT | Mod: S$GLB,,, | Performed by: PHYSICIAN ASSISTANT

## 2018-02-01 PROCEDURE — 73630 X-RAY EXAM OF FOOT: CPT | Mod: 26,LT,, | Performed by: RADIOLOGY

## 2018-02-01 PROCEDURE — 99999 PR PBB SHADOW E&M-EST. PATIENT-LVL III: CPT | Mod: PBBFAC,,, | Performed by: PHYSICIAN ASSISTANT

## 2018-02-01 PROCEDURE — 3008F BODY MASS INDEX DOCD: CPT | Mod: S$GLB,,, | Performed by: PHYSICIAN ASSISTANT

## 2018-02-01 PROCEDURE — 73630 X-RAY EXAM OF FOOT: CPT | Mod: TC,FY,PO,LT

## 2018-02-01 NOTE — PROGRESS NOTES
CC: 17-year-old male left foot pain    Date of injury January 29, 2018    HPI: He had a misstep on his left foot heard a pop and pain in his left foot that time.  Pain waxes and wanes.  He has been ambulatory and weightbearing.  Reported to the [facility] urgent care clinic but the x-ray machine wasn't working.    PMH:    Past Medical History:   Diagnosis Date    Abdominal migraine     on Topomax for about one year    Eczema     Migraines        PSH:    Past Surgical History:   Procedure Laterality Date    ADENOIDECTOMY      CHOLECYSTECTOMY      TONSILLECTOMY         Family Hx:    Family History   Problem Relation Age of Onset    Other Mother      Hypoglycemia    Heart disease Mother      Mitral valve prolapse    Migraines Mother     Migraines Sister     Hypertension Maternal Grandmother     Diabetes Maternal Grandmother     Migraines Maternal Grandmother     Hypertension Maternal Grandfather     Diabetes Maternal Grandfather     Hypertension Paternal Grandmother     Diabetes Paternal Grandmother     Hypertension Paternal Grandfather     Diabetes Paternal Grandfather     Heart disease Paternal Grandfather     Hyperlipidemia Paternal Grandfather     Short stature Neg Hx     Thyroid disease Neg Hx        Allergy:  Review of patient's allergies indicates:  No Known Allergies    Medication:  No current outpatient prescriptions on file.    Social History:  [employer] sophomore, journalism student  Social History     Social History    Marital status: Single     Spouse name: N/A    Number of children: N/A    Years of education: N/A     Occupational History    Not on file.     Social History Main Topics    Smoking status: Passive Smoke Exposure - Never Smoker    Smokeless tobacco: Never Used      Comment: Dad smokes outside    Alcohol use 3.0 oz/week     5 Standard drinks or equivalent per week    Drug use: No    Sexual activity: Not on file     Other Topics Concern    Not on file     Social History  "Narrative    In the 10th grade, doing well       Vitals:   BP (!) 137/91 (BP Location: Left arm, Patient Position: Sitting, BP Method: Medium (Automatic))   Pulse 94   Ht 5' 11" (1.803 m)   Wt 64.1 kg (141 lb 5 oz)   BMI 19.71 kg/m²      ROS:  GENERAL: No fever, chills, fatigability or weight loss.  SKIN: No rashes, itching or changes in color or texture of skin.  HEAD: No headaches or recent head trauma.  EYES: Visual acuity fine. No photophobia, ocular pain or diplopia.  EARS: Denies ear pain, discharge or vertigo.  NOSE: No loss of smell, no epistaxis or postnasal drip.  MOUTH & THROAT: No hoarseness or change in voice. No excessive gum bleeding.  NODES: Denies swollen glands.  CHEST: Denies GERMAN, cyanosis, wheezing, cough and sputum production.  CARDIOVASCULAR: Denies chest pain, PND, orthopnea or reduced exercise tolerance.  ABDOMEN: Appetite fine. No weight loss. Denies diarrhea, abdominal pain, hematemesis or blood in stool.  URINARY: No flank pain, dysuria or hematuria.  PERIPHERAL VASCULAR: No claudication or cyanosis.  NEUROLOGIC: No history of seizures, paralysis, alteration of gait or coordination.  MUSCULOSKELETAL: See HPI    PE:  APPEARANCE: Well nourished, well developed, in no acute distress.   HEAD: Normocephalic, atraumatic.  NEUROLOGIC: Cranial Nerves: II-XII grossly intact, also see MUSCULOSKELETAL  MUSCULOSKELETAL: Left ankle full weightbearing or difficulty.  Diffuse tenderness on the dorsum of the foot.  There is negative tibia tib-fib, or lateral or medial malleolus tenderness.  No fifth metatarsal tenderness.  Neurovascular is intact with good cap refill good pulses.  Assessment:           Diagnosis:              1.  Left foot sprain                   Diagnostic Studies  MRI-No  X-Ray-Yes agree with Findings: No osseous, articular, or soft tissue abnormality is noted  EMG/NCV-No  Arthrogram-No  Bone Scan-No  CT Scan-No  Doppler-No  ESR-No  CRP-No  CBC with Diff-No   Rheumatoid/Arthritis " Panel-No      Plan:                                                 1. PT-no                                                 2.OT-no                                          3.NSAID-no                                        4. Narcotics-no                                     5. Wound care-N/A                                 6. Rest-no                                           7. Surgery-no                                         8. BURTON Hose-no                                    9. Anticoagulation therapy-no               10. Elevation-no                                     11. Crutches-no                                    12. Walker-no             13. Cane no                        14. Referral-no                                     15.Injection-no                            16. Splint   /    Cast   /   Cast Shoe-Yes  ankle stirrup given            17. RICE            18. Follow up-  as needed

## 2018-02-01 NOTE — PATIENT INSTRUCTIONS
Ankle Alphabet (Flexibility)    These instructions are for your right foot. Switch sides for your left foot.  1. Sit on the floor with your legs straight in front of you.  2. Rest your right calf on a rolled-up towel. Use your foot to write the letters of the alphabet in mid-air.  3. Repeat this exercise 3 times a day, or as instructed.  Date Last Reviewed: 3/10/2016  © 6114-2657 [employer]. [address] [place] [address]. All rights reserved. This information is not intended as a substitute for professional medical care. Always follow your healthcare professional's instructions.        Foot Sprain    A sprain is a stretching or tearing of the ligaments that hold a joint together. There are no broken bones. Sprains generally take from 3-6 weeks to heal. A sprain may be treated with a splint, walking cast, or special boot. Mild sprains may not need any additional support.  Home care  The following guidelines will help you care for your injury at home:  · Keep your leg elevated when sitting or lying down. This is very important during the first 48 hours to reduce swelling. Stay off the injured foot as much as possible until you can walk on it without pain. If needed, you may use crutches during the first week for this purpose. Crutches can be rented at many pharmacies or surgical/orthopedic supply stores.  · You may be given a cast shoe to wear to prevent movement in your foot. If not, you can use a sandal or any shoe that does not put pressure on the injured area until the swelling and pain go away. If using a sandal, be careful not to hit your foot against anything, since another injury could make the sprain worse.  · Apply an ice pack over the injured area for 15 to 20 minutes every 3 to 6 hours. You should do this for the first 24 to 48 hours. You can make an ice pack by filling a plastic bag that seals at the top with ice cubes and then wrapping it with a thin towel. Continue to use  ice packs for relief of pain and swelling as needed. As the ice melts, avoid getting any wrap, splint, or cast wet. After 48 hours, apply heat from a warm shower or bath for 20 minutes several times daily. Alternating ice and heat may also be helpful.  · You may use over-the-counter pain medicine to control pain, unless another medicine was prescribed. If you have chronic liver or kidney disease or ever had a stomach ulcer or GI bleeding, talk with your healthcare provider before using these medicines.  · If you were given a splint or cast, keep it dry. Bathe with your splint or cast well out of the water, protected with 2 large plastic bags, rubber-banded at the top end. If a fiberglass splint or cast gets wet, you can dry it with a hair dryer.  · You may return to sports after healing, when you can run without pain.  Follow-up care  Follow up with your healthcare provider as directed. Sometimes fractures dont show up on the first X-ray. Bruises and sprains can sometimes hurt as much as a fracture. These injuries can take time to heal completely. If your symptoms dont improve or they get worse, talk with your healthcare provider. You may need a repeat X-ray.  When to seek medical advice  Call your healthcare provider right away if any of these occur:  · The plaster cast or splint gets wet or soft  · The fiberglass cast or splint gets wet and does not dry for 24 hours  · Pain or swelling increases, or redness appears  · A bad odor comes from within the cast  · Fever of 100.4°F (38°C) or above lasting for 24 to 48 hours  · Toes on the injured foot become cold, blue, numb, or tingly  Date Last Reviewed: 11/20/2015  © 1730-3698 [employer]. [address] [place] [address], [place] [address]. All rights reserved. This information is not intended as a substitute for professional medical care. Always follow your healthcare professional's instructions.

## 2018-02-07 ENCOUNTER — HOSPITAL ENCOUNTER (OUTPATIENT)
Facility: HOSPITAL | Age: 18
Discharge: HOME | End: 2018-02-10
Attending: EMERGENCY MEDICINE | Admitting: EMERGENCY MEDICINE
Payer: COMMERCIAL

## 2018-02-07 DIAGNOSIS — R10.9 ABDOMINAL PAIN, UNSPECIFIED ABDOMINAL LOCATION: Primary | ICD-10-CM

## 2018-02-07 DIAGNOSIS — Z79.899 MEDICATION MANAGEMENT: ICD-10-CM

## 2018-02-07 DIAGNOSIS — R11.10 VOMITING, INTRACTABILITY OF VOMITING NOT SPECIFIED, PRESENCE OF NAUSEA NOT SPECIFIED, UNSPECIFIED VOMITING TYPE: ICD-10-CM

## 2018-02-07 LAB
ALBUMIN SERPL BCP-MCNC: 5 G/DL
ALP SERPL-CCNC: 165 U/L
ALT SERPL W/O P-5'-P-CCNC: 57 U/L
AMORPH CRY UR QL COMP ASSIST: ABNORMAL
AMPHET+METHAMPHET UR QL: NEGATIVE
ANION GAP SERPL CALC-SCNC: 14 MMOL/L
AST SERPL-CCNC: 97 U/L
BARBITURATES UR QL SCN>200 NG/ML: NEGATIVE
BASOPHILS # BLD AUTO: 0.02 K/UL
BASOPHILS NFR BLD: 0.2 %
BENZODIAZ UR QL SCN>200 NG/ML: NEGATIVE
BILIRUB SERPL-MCNC: 1.1 MG/DL
BILIRUB UR QL STRIP: NEGATIVE
BUN SERPL-MCNC: 14 MG/DL
BZE UR QL SCN: NEGATIVE
CALCIUM SERPL-MCNC: 10.6 MG/DL
CANNABINOIDS UR QL SCN: NEGATIVE
CHLORIDE SERPL-SCNC: 102 MMOL/L
CLARITY UR REFRACT.AUTO: ABNORMAL
CO2 SERPL-SCNC: 24 MMOL/L
COLOR UR AUTO: YELLOW
CREAT SERPL-MCNC: 1.1 MG/DL
CREAT UR-MCNC: 79 MG/DL
DIFFERENTIAL METHOD: ABNORMAL
EOSINOPHIL # BLD AUTO: 0 K/UL
EOSINOPHIL NFR BLD: 0.2 %
ERYTHROCYTE [DISTWIDTH] IN BLOOD BY AUTOMATED COUNT: 12.3 %
EST. GFR  (AFRICAN AMERICAN): >60 ML/MIN/1.73 M^2
EST. GFR  (NON AFRICAN AMERICAN): >60 ML/MIN/1.73 M^2
GLUCOSE SERPL-MCNC: 150 MG/DL
GLUCOSE UR QL STRIP: NEGATIVE
HCT VFR BLD AUTO: 45.3 %
HGB BLD-MCNC: 16.3 G/DL
HGB UR QL STRIP: NEGATIVE
IMM GRANULOCYTES # BLD AUTO: 0.03 K/UL
IMM GRANULOCYTES NFR BLD AUTO: 0.3 %
KETONES UR QL STRIP: ABNORMAL
LACTATE SERPL-SCNC: 2.4 MMOL/L
LEUKOCYTE ESTERASE UR QL STRIP: NEGATIVE
LIPASE SERPL-CCNC: 27 U/L
LYMPHOCYTES # BLD AUTO: 0.7 K/UL
LYMPHOCYTES NFR BLD: 8.1 %
MCH RBC QN AUTO: 29.3 PG
MCHC RBC AUTO-ENTMCNC: 36 G/DL
MCV RBC AUTO: 81 FL
METHADONE UR QL SCN>300 NG/ML: NEGATIVE
MICROSCOPIC COMMENT: ABNORMAL
MONOCYTES # BLD AUTO: 0.5 K/UL
MONOCYTES NFR BLD: 5.7 %
NEUTROPHILS # BLD AUTO: 7.5 K/UL
NEUTROPHILS NFR BLD: 85.5 %
NITRITE UR QL STRIP: NEGATIVE
NRBC BLD-RTO: 0 /100 WBC
OPIATES UR QL SCN: NEGATIVE
PCP UR QL SCN>25 NG/ML: NEGATIVE
PH UR STRIP: >8 [PH] (ref 5–8)
PLATELET # BLD AUTO: 156 K/UL
PMV BLD AUTO: 8.9 FL
POTASSIUM SERPL-SCNC: 3.4 MMOL/L
PROT SERPL-MCNC: 7.9 G/DL
PROT UR QL STRIP: NEGATIVE
RBC # BLD AUTO: 5.57 M/UL
SODIUM SERPL-SCNC: 140 MMOL/L
SP GR UR STRIP: 1.01 (ref 1–1.03)
TOXICOLOGY INFORMATION: NORMAL
URN SPEC COLLECT METH UR: ABNORMAL
UROBILINOGEN UR STRIP-ACNC: NEGATIVE EU/DL
WBC # BLD AUTO: 8.78 K/UL

## 2018-02-07 PROCEDURE — 96361 HYDRATE IV INFUSION ADD-ON: CPT

## 2018-02-07 PROCEDURE — 25000003 PHARM REV CODE 250: Performed by: EMERGENCY MEDICINE

## 2018-02-07 PROCEDURE — 81001 URINALYSIS AUTO W/SCOPE: CPT

## 2018-02-07 PROCEDURE — 83605 ASSAY OF LACTIC ACID: CPT

## 2018-02-07 PROCEDURE — 80307 DRUG TEST PRSMV CHEM ANLYZR: CPT

## 2018-02-07 PROCEDURE — 96376 TX/PRO/DX INJ SAME DRUG ADON: CPT

## 2018-02-07 PROCEDURE — 25000003 PHARM REV CODE 250: Performed by: STUDENT IN AN ORGANIZED HEALTH CARE EDUCATION/TRAINING PROGRAM

## 2018-02-07 PROCEDURE — G0378 HOSPITAL OBSERVATION PER HR: HCPCS

## 2018-02-07 PROCEDURE — 63600175 PHARM REV CODE 636 W HCPCS: Performed by: STUDENT IN AN ORGANIZED HEALTH CARE EDUCATION/TRAINING PROGRAM

## 2018-02-07 PROCEDURE — S0028 INJECTION, FAMOTIDINE, 20 MG: HCPCS | Performed by: STUDENT IN AN ORGANIZED HEALTH CARE EDUCATION/TRAINING PROGRAM

## 2018-02-07 PROCEDURE — 83690 ASSAY OF LIPASE: CPT

## 2018-02-07 PROCEDURE — 96374 THER/PROPH/DIAG INJ IV PUSH: CPT

## 2018-02-07 PROCEDURE — 63600175 PHARM REV CODE 636 W HCPCS: Performed by: EMERGENCY MEDICINE

## 2018-02-07 PROCEDURE — 99219 PR INITIAL OBSERVATION CARE,LEVL II: CPT | Mod: ,,, | Performed by: PHYSICIAN ASSISTANT

## 2018-02-07 PROCEDURE — 94761 N-INVAS EAR/PLS OXIMETRY MLT: CPT

## 2018-02-07 PROCEDURE — 85025 COMPLETE CBC W/AUTO DIFF WBC: CPT

## 2018-02-07 PROCEDURE — 96375 TX/PRO/DX INJ NEW DRUG ADDON: CPT

## 2018-02-07 PROCEDURE — 99284 EMERGENCY DEPT VISIT MOD MDM: CPT | Mod: 25

## 2018-02-07 PROCEDURE — 80053 COMPREHEN METABOLIC PANEL: CPT

## 2018-02-07 RX ORDER — ONDANSETRON 2 MG/ML
4 INJECTION INTRAMUSCULAR; INTRAVENOUS EVERY 8 HOURS PRN
Status: DISCONTINUED | OUTPATIENT
Start: 2018-02-08 | End: 2018-02-09

## 2018-02-07 RX ORDER — PROCHLORPERAZINE 25 MG
25 SUPPOSITORY, RECTAL RECTAL EVERY 12 HOURS PRN
Qty: 10 SUPPOSITORY | Refills: 0 | Status: SHIPPED | OUTPATIENT
Start: 2018-02-07 | End: 2018-02-10

## 2018-02-07 RX ORDER — ACETAMINOPHEN 325 MG/1
650 TABLET ORAL EVERY 8 HOURS PRN
Status: DISCONTINUED | OUTPATIENT
Start: 2018-02-08 | End: 2018-02-10

## 2018-02-07 RX ORDER — DIPHENHYDRAMINE HCL 25 MG
25 CAPSULE ORAL EVERY 6 HOURS PRN
Qty: 20 CAPSULE | Refills: 0 | Status: SHIPPED | OUTPATIENT
Start: 2018-02-07 | End: 2018-02-14

## 2018-02-07 RX ORDER — FENTANYL CITRATE 50 UG/ML
25 INJECTION, SOLUTION INTRAMUSCULAR; INTRAVENOUS
Status: COMPLETED | OUTPATIENT
Start: 2018-02-07 | End: 2018-02-07

## 2018-02-07 RX ORDER — SODIUM CHLORIDE 0.9 % (FLUSH) 0.9 %
5 SYRINGE (ML) INJECTION
Status: DISCONTINUED | OUTPATIENT
Start: 2018-02-08 | End: 2018-02-10 | Stop reason: HOSPADM

## 2018-02-07 RX ORDER — ACETAMINOPHEN 325 MG/1
650 TABLET ORAL EVERY 4 HOURS PRN
Status: DISCONTINUED | OUTPATIENT
Start: 2018-02-08 | End: 2018-02-10 | Stop reason: HOSPADM

## 2018-02-07 RX ORDER — DIPHENHYDRAMINE HYDROCHLORIDE 50 MG/ML
50 INJECTION INTRAMUSCULAR; INTRAVENOUS
Status: DISCONTINUED | OUTPATIENT
Start: 2018-02-07 | End: 2018-02-07

## 2018-02-07 RX ORDER — ONDANSETRON 2 MG/ML
8 INJECTION INTRAMUSCULAR; INTRAVENOUS
Status: COMPLETED | OUTPATIENT
Start: 2018-02-07 | End: 2018-02-07

## 2018-02-07 RX ORDER — ONDANSETRON 8 MG/1
8 TABLET, ORALLY DISINTEGRATING ORAL EVERY 8 HOURS PRN
Status: DISCONTINUED | OUTPATIENT
Start: 2018-02-08 | End: 2018-02-10 | Stop reason: HOSPADM

## 2018-02-07 RX ORDER — GLUCAGON 1 MG
1 KIT INJECTION
Status: DISCONTINUED | OUTPATIENT
Start: 2018-02-08 | End: 2018-02-10 | Stop reason: HOSPADM

## 2018-02-07 RX ORDER — IBUPROFEN 200 MG
16 TABLET ORAL
Status: DISCONTINUED | OUTPATIENT
Start: 2018-02-08 | End: 2018-02-10 | Stop reason: HOSPADM

## 2018-02-07 RX ORDER — FENTANYL CITRATE 50 UG/ML
25 INJECTION, SOLUTION INTRAMUSCULAR; INTRAVENOUS
Status: DISCONTINUED | OUTPATIENT
Start: 2018-02-07 | End: 2018-02-07

## 2018-02-07 RX ORDER — DIPHENHYDRAMINE HYDROCHLORIDE 50 MG/ML
12.5 INJECTION INTRAMUSCULAR; INTRAVENOUS
Status: DISCONTINUED | OUTPATIENT
Start: 2018-02-07 | End: 2018-02-07

## 2018-02-07 RX ORDER — FAMOTIDINE 10 MG/ML
20 INJECTION INTRAVENOUS
Status: COMPLETED | OUTPATIENT
Start: 2018-02-07 | End: 2018-02-07

## 2018-02-07 RX ORDER — PROCHLORPERAZINE EDISYLATE 5 MG/ML
10 INJECTION INTRAMUSCULAR; INTRAVENOUS ONCE
Status: COMPLETED | OUTPATIENT
Start: 2018-02-07 | End: 2018-02-07

## 2018-02-07 RX ORDER — IPRATROPIUM BROMIDE AND ALBUTEROL SULFATE 2.5; .5 MG/3ML; MG/3ML
3 SOLUTION RESPIRATORY (INHALATION) EVERY 4 HOURS PRN
Status: DISCONTINUED | OUTPATIENT
Start: 2018-02-08 | End: 2018-02-10 | Stop reason: HOSPADM

## 2018-02-07 RX ORDER — IBUPROFEN 200 MG
24 TABLET ORAL
Status: DISCONTINUED | OUTPATIENT
Start: 2018-02-08 | End: 2018-02-10 | Stop reason: HOSPADM

## 2018-02-07 RX ORDER — AMOXICILLIN 250 MG
1 CAPSULE ORAL 2 TIMES DAILY
Status: DISCONTINUED | OUTPATIENT
Start: 2018-02-08 | End: 2018-02-10

## 2018-02-07 RX ORDER — SODIUM CHLORIDE 9 MG/ML
INJECTION, SOLUTION INTRAVENOUS CONTINUOUS
Status: DISCONTINUED | OUTPATIENT
Start: 2018-02-08 | End: 2018-02-10 | Stop reason: HOSPADM

## 2018-02-07 RX ADMIN — FENTANYL CITRATE 25 MCG: 50 INJECTION INTRAMUSCULAR; INTRAVENOUS at 04:02

## 2018-02-07 RX ADMIN — PROCHLORPERAZINE EDISYLATE 10 MG: 5 INJECTION INTRAMUSCULAR; INTRAVENOUS at 04:02

## 2018-02-07 RX ADMIN — FAMOTIDINE 20 MG: 10 INJECTION, SOLUTION INTRAVENOUS at 06:02

## 2018-02-07 RX ADMIN — SODIUM CHLORIDE 1000 ML: 0.9 INJECTION, SOLUTION INTRAVENOUS at 04:02

## 2018-02-07 RX ADMIN — ALUMINUM HYDROXIDE, MAGNESIUM HYDROXIDE, AND SIMETHICONE 50 ML: 200; 200; 20 SUSPENSION ORAL at 07:02

## 2018-02-07 RX ADMIN — ONDANSETRON 8 MG: 2 INJECTION INTRAMUSCULAR; INTRAVENOUS at 04:02

## 2018-02-07 RX ADMIN — FENTANYL CITRATE 25 MCG: 50 INJECTION, SOLUTION INTRAMUSCULAR; INTRAVENOUS at 06:02

## 2018-02-07 RX ADMIN — DIPHENHYDRAMINE HYDROCHLORIDE 12.5 MG: 50 INJECTION, SOLUTION INTRAMUSCULAR; INTRAVENOUS at 04:02

## 2018-02-07 NOTE — ED NOTES
Bed: [facility] 01  Expected date:   Expected time:   Means of arrival:   Comments:

## 2018-02-07 NOTE — ED PROVIDER NOTES
" Encounter Date: 2/7/2018    SCRIBE #1 NOTE: I, Alli Sullivan, am scribing for, and in the presence of,  Jeremías Buchanan MD. I have scribed the following portions of the note - the Resident attestation.       History     Chief Complaint   Patient presents with    Abdominal Pain     Pt c/o abdominal pain, nausea & vomiting since 12 noon.  Pt was admitted at [facility] for same-d/c'd yesterday.  Dx with "functional vomiting or abd migraines"     HPI   16 y/o male with PMHx significant for eczema, migraines who presents to the ED with abdominal pain and vomiting. States that he was seen previously in [facility] for same, discharged yesterday and went home and had worsening of his pain. States that he has been moving his bowels well, no diarrhea, has been passing gas. States that he had lap michelle performed approximately one month ago, has not improved his pain. States that his pain has not improved. Pain is maximal in onset, located over the LUQ and epigastrium, lasts approximately 1 hour when it comes and normally abates on its own. States that he was told he has "functional vomiting" as outside hospital.    Review of patient's allergies indicates:  No Known Allergies  Past Medical History:   Diagnosis Date    Abdominal migraine     on Topomax for about one year    Eczema     Migraines      Past Surgical History:   Procedure Laterality Date    ADENOIDECTOMY      CHOLECYSTECTOMY      TONSILLECTOMY       Family History   Problem Relation Age of Onset    Other Mother      Hypoglycemia    Heart disease Mother      Mitral valve prolapse    Migraines Mother     Migraines Sister     Hypertension Maternal Grandmother     Diabetes Maternal Grandmother     Migraines Maternal Grandmother     Hypertension Maternal Grandfather     Diabetes Maternal Grandfather     Hypertension Paternal Grandmother     Diabetes Paternal Grandmother     Hypertension Paternal Grandfather     Diabetes Paternal Grandfather  "    Heart disease Paternal Grandfather     Hyperlipidemia Paternal Grandfather     Short stature Neg Hx     Thyroid disease Neg Hx      Social History   Substance Use Topics    Smoking status: Passive Smoke Exposure - Never Smoker    Smokeless tobacco: Never Used      Comment: Dad smokes outside    Alcohol use 3.0 oz/week     5 Standard drinks or equivalent per week     Review of Systems   Constitutional: Negative for chills and fever.   HENT: Negative for drooling and tinnitus.    Eyes: Negative for photophobia and visual disturbance.   Respiratory: Negative for shortness of breath and wheezing.    Cardiovascular: Negative for chest pain and palpitations.   Gastrointestinal: Positive for abdominal pain, nausea and vomiting. Negative for blood in stool and diarrhea.   Genitourinary: Negative for flank pain and hematuria.   Musculoskeletal: Negative for neck pain and neck stiffness.   Skin: Negative for color change and pallor.   Neurological: Positive for weakness. Negative for dizziness, syncope, light-headedness and numbness.   Psychiatric/Behavioral: Negative for agitation and confusion.       Physical Exam     Initial Vitals [02/07/18 1516]   BP Pulse Resp Temp SpO2   (!) 152/80 108 20 98 °F (36.7 °C) 100 %      MAP       104         Physical Exam    Constitutional: He appears well-developed and well-nourished.   Laying in bed, appears tired and mildly uncomfortable, emesis bag in hand   HENT:   Head: Normocephalic and atraumatic.   Mouth/Throat: No oropharyngeal exudate.   Eyes: EOM are normal. Pupils are equal, round, and reactive to light. No scleral icterus.   Neck: Normal range of motion. Neck supple. No tracheal deviation present. No JVD present.   Cardiovascular: Regular rhythm. Exam reveals no gallop and no friction rub.    No murmur heard.  Tachycardic   Pulmonary/Chest: Breath sounds normal. No respiratory distress. He has no wheezes. He has no rhonchi. He has no rales.   Abdominal: Soft. There  is no tenderness. There is no rebound and no guarding.   TTP diffusely, most prominent over the LUQ and epigastrium, no organomegaly; POCUS does not show free fluid, no hydronephrosis, no CBD dilation, no stone visualized   Musculoskeletal: Normal range of motion.   Neurological: He is alert and oriented to person, place, and time. No cranial nerve deficit.   Skin: Skin is warm and dry.   Psychiatric: He has a normal mood and affect. Thought content normal.         ED Course   Procedures  Labs Reviewed   CBC W/ AUTO DIFFERENTIAL - Abnormal; Notable for the following:        Result Value    MCV 81 (*)     MPV 8.9 (*)     Lymph # 0.7 (*)     Gran% 85.5 (*)     Lymph% 8.1 (*)     All other components within normal limits   COMPREHENSIVE METABOLIC PANEL - Abnormal; Notable for the following:     Potassium 3.4 (*)     Glucose 150 (*)     Calcium 10.6 (*)     Total Bilirubin 1.1 (*)     Alkaline Phosphatase 165 (*)     AST 97 (*)     ALT 57 (*)     All other components within normal limits   LACTIC ACID, PLASMA - Abnormal; Notable for the following:     Lactate (Lactic Acid) 2.4 (*)     All other components within normal limits   URINALYSIS, REFLEX TO URINE CULTURE - Abnormal; Notable for the following:     Appearance, UA Cloudy (*)     pH, UA >8.0 (*)     Ketones, UA 1+ (*)     All other components within normal limits    Narrative:     Preferred Collection Type->Urine, Clean Catch   URINALYSIS MICROSCOPIC - Abnormal; Notable for the following:     Amorphous, UA Many (*)     All other components within normal limits    Narrative:     Preferred Collection Type->Urine, Clean Catch   LIPASE   DRUG SCREEN PANEL, URINE EMERGENCY    Narrative:     Preferred Collection Type->Urine, Clean Catch        Assessment: 16 y/o male with abdominal pain, vomiting    Ddx includes but is not limited to: appendicitis, cyclic vomiting, cannabinoid hyperemesis, Dm1 new onset, electrolyte abnormality, gastroenteritis, acute intermittent  porphyria, pyelo vs nephrolith    Plan: Patient is currently tachycardic, has elevated blood pressure to 152/80, likely secondary to pain and volume loss. Patient getting hydrated with IVF, getting CMP, CBC, lipase and lactate. Patient feeling better with IVF, medication for nausea, getting fentanyl as well. Zofran, compazine and benadryl for his symptoms. Obtaining records from OSH to to evaluate what was done previously, imaging being obtained as well. Patient has outpatient f/u scheduled for tomorrow, however did see his GI doctor today and was told to come here. Anticipate will speak with gastroenterologist regarding patient's dispo. Patient in agreement with plan.    WILLIAM Luz  2/7/2018 4:29 PM     1 Update:  Patient given compazine, benadryl for worsening nausea and vomiting. Given second lite of fluid as well, has slightly increased lactate to 2.4. Has a mild transaminitis to 97/57 on AST/ALT, respectively. Patient otherwise has fairly unremarkable CMP, does show K of 3.4, could be from volume loss. Will continue to evaluate patient here in ED.    WILLIAM Luz  2/7/2018 5:06 PM     1 Update:  Spoke with patient and mother several times, mother is stating that the patient is in severe pain. When I evaluated the patient, he was sleeping in the recliner. I explained to the patient and his mother that he needs to keep his appointment tomorrow with GI with Dr. Breaux, but that the patient is currently stable for discharge at this time. I explained that the patient's labs were improved from yesterday. The patient also was able to tolerate ice chips, did not have further emesis after he was given medication.     WILLIAM Luz  2/7/2018 7:39 PM     1 Update:  Spoke with patient and patient's mother again, patient will be admitted to observation for symptom management, intractable pain with plan for GI to see the patient in the morning.    WILLIAM Luz  2/7/2018 8:07 PM       Medical  Decision Making:   History:   Old Medical Records: I decided to obtain old medical records.  Clinical Tests:   Lab Tests: Ordered and Reviewed            Scribe Attestation:   Scribe #1: I performed the above scribed service and the documentation accurately describes the services I performed. I attest to the accuracy of the note.    Attending Attestation:   Physician Attestation Statement for Resident:  As the supervising MD   Physician Attestation Statement: I have personally seen and examined this patient.   I agree with the above history. -: Patient has had admission and numerous workups for the same complaint. We will evaluate that it is not an emergent condition and provide antiemetics. Patient is scheduled to follow up with GI tomorrow.    As the supervising MD I agree with the above PE.    As the supervising MD I agree with the above treatment, course, plan, and disposition.            Attending ED Notes:   7:30 PM. Patient has been sleeping comfortably throughout his stay. I met with the family several times and discussed results and plan. I attempted to give the mother reassurance. However, she appeared to take offense to this. I told her that the best plan for the patient is to keep his appointment tomorrow at 2PM. The mother seems preoccupied with the patient being in excruciating pain; however, the patient was sleeping comfortably.           ED Course as of Feb 07 2116   Wed Feb 07, 2018   1831 Microscopic Comment: SEE COMMENT [PM]   1839 Toxicology Information: SEE COMMENT [RS]      ED Course User Index  [PM] Jose Antonio Nelson MD  [RS] Jeremías Buchanan MD     Clinical Impression:   The primary encounter diagnosis was Abdominal pain, unspecified abdominal location. A diagnosis of Vomiting, intractability of vomiting not specified, presence of nausea not specified, unspecified vomiting type was also pertinent to this visit.                           Jeremías Buchanan MD  02/07/18 2116

## 2018-02-07 NOTE — ED TRIAGE NOTES
Present to ER due to vomiting 16-20 times today.  Complaining of cramping all over especially in his abdomen.  Discharged from [facility] yesterday.    Pt identifiers checked and correct  LOC: The patient is awake, alert, aware of environment with an appropriate affect. Oriented x3, speaking appropriately  APPEARANCE: Pt resting comfortably, in no acute distress, pt is clean and well groomed, clothing properly fastened  SKIN: Skin warm, dry and intact, normal skin turgor, moist mucus membranes  RESPIRATORY: Airway is open and patent, respirations are spontaneous, even and unlabored, normal effort and rate  MUSCULOSKELETAL: No obvious deformities.

## 2018-02-08 LAB
ANION GAP SERPL CALC-SCNC: 6 MMOL/L
BASOPHILS # BLD AUTO: 0.02 K/UL
BASOPHILS NFR BLD: 0.3 %
BUN SERPL-MCNC: 8 MG/DL
CALCIUM SERPL-MCNC: 9.2 MG/DL
CHLORIDE SERPL-SCNC: 108 MMOL/L
CO2 SERPL-SCNC: 24 MMOL/L
CREAT SERPL-MCNC: 0.9 MG/DL
DIFFERENTIAL METHOD: ABNORMAL
EOSINOPHIL # BLD AUTO: 0.1 K/UL
EOSINOPHIL NFR BLD: 0.8 %
ERYTHROCYTE [DISTWIDTH] IN BLOOD BY AUTOMATED COUNT: 12.7 %
EST. GFR  (AFRICAN AMERICAN): >60 ML/MIN/1.73 M^2
EST. GFR  (NON AFRICAN AMERICAN): >60 ML/MIN/1.73 M^2
GLUCOSE SERPL-MCNC: 91 MG/DL
HCT VFR BLD AUTO: 38.4 %
HGB BLD-MCNC: 13.7 G/DL
IGA SERPL-MCNC: 36 MG/DL
IMM GRANULOCYTES # BLD AUTO: 0.02 K/UL
IMM GRANULOCYTES NFR BLD AUTO: 0.3 %
LYMPHOCYTES # BLD AUTO: 1.9 K/UL
LYMPHOCYTES NFR BLD: 25.7 %
MAGNESIUM SERPL-MCNC: 2.1 MG/DL
MCH RBC QN AUTO: 29.4 PG
MCHC RBC AUTO-ENTMCNC: 35.7 G/DL
MCV RBC AUTO: 82 FL
MONOCYTES # BLD AUTO: 0.9 K/UL
MONOCYTES NFR BLD: 11.6 %
NEUTROPHILS # BLD AUTO: 4.5 K/UL
NEUTROPHILS NFR BLD: 61.3 %
NRBC BLD-RTO: 0 /100 WBC
PHOSPHATE SERPL-MCNC: 3.7 MG/DL
PLATELET # BLD AUTO: 129 K/UL
PMV BLD AUTO: 8.9 FL
POTASSIUM SERPL-SCNC: 3.7 MMOL/L
RBC # BLD AUTO: 4.66 M/UL
SODIUM SERPL-SCNC: 138 MMOL/L
T4 SERPL-MCNC: 7 UG/DL
TSH SERPL DL<=0.005 MIU/L-ACNC: 0.73 UIU/ML
WBC # BLD AUTO: 7.4 K/UL

## 2018-02-08 PROCEDURE — 80074 ACUTE HEPATITIS PANEL: CPT

## 2018-02-08 PROCEDURE — 84443 ASSAY THYROID STIM HORMONE: CPT

## 2018-02-08 PROCEDURE — G0378 HOSPITAL OBSERVATION PER HR: HCPCS

## 2018-02-08 PROCEDURE — 82784 ASSAY IGA/IGD/IGG/IGM EACH: CPT

## 2018-02-08 PROCEDURE — 80048 BASIC METABOLIC PNL TOTAL CA: CPT

## 2018-02-08 PROCEDURE — 99225 PR SUBSEQUENT OBSERVATION CARE,LEVEL II: CPT | Mod: ,,, | Performed by: PHYSICIAN ASSISTANT

## 2018-02-08 PROCEDURE — 82300 ASSAY OF CADMIUM: CPT

## 2018-02-08 PROCEDURE — 83516 IMMUNOASSAY NONANTIBODY: CPT

## 2018-02-08 PROCEDURE — 82135 ASSAY AMINOLEVULINIC ACID: CPT

## 2018-02-08 PROCEDURE — 63600175 PHARM REV CODE 636 W HCPCS: Performed by: PHYSICIAN ASSISTANT

## 2018-02-08 PROCEDURE — 25000003 PHARM REV CODE 250: Performed by: PHYSICIAN ASSISTANT

## 2018-02-08 PROCEDURE — 84100 ASSAY OF PHOSPHORUS: CPT

## 2018-02-08 PROCEDURE — 85025 COMPLETE CBC W/AUTO DIFF WBC: CPT

## 2018-02-08 PROCEDURE — 83520 IMMUNOASSAY QUANT NOS NONAB: CPT

## 2018-02-08 PROCEDURE — 99214 OFFICE O/P EST MOD 30 MIN: CPT | Mod: ,,, | Performed by: INTERNAL MEDICINE

## 2018-02-08 PROCEDURE — 36415 COLL VENOUS BLD VENIPUNCTURE: CPT

## 2018-02-08 PROCEDURE — 84436 ASSAY OF TOTAL THYROXINE: CPT

## 2018-02-08 PROCEDURE — 84311 SPECTROPHOTOMETRY: CPT

## 2018-02-08 PROCEDURE — 83735 ASSAY OF MAGNESIUM: CPT

## 2018-02-08 RX ORDER — PANTOPRAZOLE SODIUM 40 MG/1
40 TABLET, DELAYED RELEASE ORAL DAILY
Status: DISCONTINUED | OUTPATIENT
Start: 2018-02-08 | End: 2018-02-10 | Stop reason: HOSPADM

## 2018-02-08 RX ORDER — HYOSCYAMINE SULFATE 0.12 MG/1
0.12 TABLET SUBLINGUAL EVERY 4 HOURS PRN
Status: DISCONTINUED | OUTPATIENT
Start: 2018-02-08 | End: 2018-02-10

## 2018-02-08 RX ADMIN — ONDANSETRON 4 MG: 2 INJECTION INTRAMUSCULAR; INTRAVENOUS at 01:02

## 2018-02-08 RX ADMIN — HYOSCYAMINE SULFATE 0.12 MG: 0.12 TABLET ORAL; SUBLINGUAL at 01:02

## 2018-02-08 RX ADMIN — SODIUM CHLORIDE: 0.9 INJECTION, SOLUTION INTRAVENOUS at 01:02

## 2018-02-08 RX ADMIN — Medication 1 CAPSULE: at 09:02

## 2018-02-08 RX ADMIN — PANTOPRAZOLE SODIUM 40 MG: 40 TABLET, DELAYED RELEASE ORAL at 09:02

## 2018-02-08 NOTE — SUBJECTIVE & OBJECTIVE
Past Medical History:   Diagnosis Date    Abdominal migraine     on Topomax for about one year    Eczema     Migraines        Past Surgical History:   Procedure Laterality Date    ADENOIDECTOMY      CHOLECYSTECTOMY      TONSILLECTOMY         Review of patient's allergies indicates:  No Known Allergies  Family History     Problem Relation (Age of Onset)    Diabetes Maternal Grandmother, Maternal Grandfather, Paternal Grandmother, Paternal Grandfather    Heart disease Mother, Paternal Grandfather    Hyperlipidemia Paternal Grandfather    Hypertension Maternal Grandmother, Maternal Grandfather, Paternal Grandmother, Paternal Grandfather    Migraines Mother, Sister, Maternal Grandmother    Other Mother        Social History Main Topics    Smoking status: Passive Smoke Exposure - Never Smoker    Smokeless tobacco: Never Used      Comment: Dad smokes outside    Alcohol use 3.0 oz/week     5 Standard drinks or equivalent per week    Drug use: No    Sexual activity: No     Review of Systems   Constitutional: Negative for chills, fatigue, fever and unexpected weight change.   HENT: Negative for congestion, sore throat and trouble swallowing.    Respiratory: Negative for cough, shortness of breath and wheezing.    Cardiovascular: Negative for chest pain and palpitations.   Gastrointestinal: Positive for abdominal pain, nausea and vomiting. Negative for blood in stool, constipation and diarrhea.   Endocrine: Negative for cold intolerance and heat intolerance.   Genitourinary: Negative for difficulty urinating and dysuria.   Musculoskeletal: Negative for myalgias and neck pain.   Skin: Negative for rash.   Neurological: Negative for dizziness, light-headedness and headaches.   Hematological: Does not bruise/bleed easily.   Psychiatric/Behavioral: Negative for agitation and behavioral problems.     Objective:     Vital Signs (Most Recent):  Temp: 96.9 °F (36.1 °C) (02/08/18 0743)  Pulse: 60 (02/08/18 0743)  Resp: 17  (02/08/18 0743)  BP: (!) 100/53 (02/08/18 0743)  SpO2: 98 % (02/08/18 0743) Vital Signs (24h Range):  Temp:  [96.9 °F (36.1 °C)-99.1 °F (37.3 °C)] 96.9 °F (36.1 °C)  Pulse:  [] 60  Resp:  [12-20] 17  SpO2:  [97 %-100 %] 98 %  BP: (100-166)/(53-95) 100/53     Weight: 63.5 kg (140 lb 1 oz) (02/07/18 2241)  Body mass index is 19.53 kg/m².      Intake/Output Summary (Last 24 hours) at 02/08/18 0857  Last data filed at 02/08/18 0600   Gross per 24 hour   Intake             3125 ml   Output             1900 ml   Net             1225 ml       Lines/Drains/Airways     Peripheral Intravenous Line                 Peripheral IV - Single Lumen 02/07/18 1857 Left Antecubital less than 1 day                Physical Exam   Constitutional: He is oriented to person, place, and time. He appears well-developed and well-nourished.   HENT:   Head: Normocephalic and atraumatic.   Neck: Normal range of motion.   Cardiovascular: Normal rate, regular rhythm and normal heart sounds.  Exam reveals no gallop and no friction rub.    No murmur heard.  Pulmonary/Chest: Effort normal and breath sounds normal. No respiratory distress. He has no wheezes. He has no rales.   Abdominal: Soft. There is tenderness (Mostly Epigastric).   Musculoskeletal: He exhibits no edema.   Neurological: He is alert and oriented to person, place, and time.   Skin: Skin is warm and dry.       Significant Labs:  CBC:   Recent Labs  Lab 02/07/18  1550 02/08/18  0549   WBC 8.78 7.40   HGB 16.3 13.7*   HCT 45.3 38.4*    129*       Significant Imaging:  Imaging results within the past 24 hours have been reviewed.

## 2018-02-08 NOTE — SUBJECTIVE & OBJECTIVE
Past Medical History:   Diagnosis Date    Abdominal migraine     on Topomax for about one year    Eczema     Migraines        Past Surgical History:   Procedure Laterality Date    ADENOIDECTOMY      CHOLECYSTECTOMY      TONSILLECTOMY         Review of patient's allergies indicates:  No Known Allergies    No current facility-administered medications on file prior to encounter.      No current outpatient prescriptions on file prior to encounter.     Family History     Problem Relation (Age of Onset)    Diabetes Maternal Grandmother, Maternal Grandfather, Paternal Grandmother, Paternal Grandfather    Heart disease Mother, Paternal Grandfather    Hyperlipidemia Paternal Grandfather    Hypertension Maternal Grandmother, Maternal Grandfather, Paternal Grandmother, Paternal Grandfather    Migraines Mother, Sister, Maternal Grandmother    Other Mother        Social History Main Topics    Smoking status: Passive Smoke Exposure - Never Smoker    Smokeless tobacco: Never Used      Comment: Dad smokes outside    Alcohol use 3.0 oz/week     5 Standard drinks or equivalent per week    Drug use: No    Sexual activity: No     Review of Systems   Constitutional: Negative for chills, fatigue, fever and unexpected weight change.   HENT: Negative for congestion, sore throat and trouble swallowing.    Respiratory: Negative for cough, shortness of breath and wheezing.    Cardiovascular: Negative for chest pain and palpitations.   Gastrointestinal: Positive for abdominal pain, nausea and vomiting. Negative for blood in stool, constipation and diarrhea.   Endocrine: Negative for cold intolerance and heat intolerance.   Genitourinary: Negative for difficulty urinating, discharge, dysuria, flank pain and hematuria.   Musculoskeletal: Negative for myalgias and neck pain.   Skin: Negative for rash.   Neurological: Positive for weakness. Negative for dizziness, light-headedness and headaches.   Hematological: Does not bruise/bleed  easily.   Psychiatric/Behavioral: Negative for agitation and behavioral problems.     Objective:     Vital Signs (Most Recent):  Temp: 98.4 °F (36.9 °C) (02/07/18 2327)  Pulse: 75 (02/07/18 2327)  Resp: 16 (02/07/18 2327)  BP: 126/74 (02/07/18 2327)  SpO2: 97 % (02/07/18 2327) Vital Signs (24h Range):  Temp:  [98 °F (36.7 °C)-99.1 °F (37.3 °C)] 98.4 °F (36.9 °C)  Pulse:  [] 75  Resp:  [16-20] 16  SpO2:  [97 %-100 %] 97 %  BP: (126-152)/(74-89) 126/74     Weight: 63.5 kg (140 lb)  Body mass index is 19.53 kg/m².    Physical Exam   Constitutional: He is oriented to person, place, and time. He appears well-developed and well-nourished.   HENT:   Head: Normocephalic and atraumatic.   Right Ear: External ear normal.   Left Ear: External ear normal.   Eyes: Conjunctivae and EOM are normal. Pupils are equal, round, and reactive to light.   Neck: Normal range of motion. Neck supple.   Cardiovascular: Normal rate, regular rhythm, normal heart sounds and intact distal pulses.  Exam reveals no gallop and no friction rub.    No murmur heard.  Pulmonary/Chest: Effort normal and breath sounds normal. He has no wheezes. He has no rales.   Abdominal: Soft. Bowel sounds are normal. He exhibits no distension. There is no rebound and no guarding.   Diffuse TTP   Musculoskeletal: Normal range of motion.   Neurological: He is alert and oriented to person, place, and time.   Skin: Skin is warm and dry.   Psychiatric: He has a normal mood and affect. His behavior is normal. Judgment and thought content normal.   Nursing note and vitals reviewed.        CRANIAL NERVES     CN III, IV, VI   Pupils are equal, round, and reactive to light.  Extraocular motions are normal.        Significant Labs:   CBC:   Recent Labs  Lab 02/07/18  1550   WBC 8.78   HGB 16.3   HCT 45.3        CMP:     Recent Labs  Lab 02/07/18  1550      K 3.4*      CO2 24   *   BUN 14   CREATININE 1.1   CALCIUM 10.6*   PROT 7.9   ALBUMIN 5.0    BILITOT 1.1*   ALKPHOS 165*   AST 97*   ALT 57*   ANIONGAP 14   EGFRNONAA >60.0     Lipase:   Recent Labs  Lab 02/07/18  1550   LIPASE 27     All pertinent labs within the past 24 hours have been reviewed.    Significant Imaging: I have reviewed all pertinent imaging results/findings within the past 24 hours.

## 2018-02-08 NOTE — H&P
"[facility] Hospital Medicine  History & Physical    Patient Name: Arian Farris  MRN: [id number]  Admission Date: 2/7/2018  Attending Physician: Radha Phoenix MD   Primary Care Provider: Moises Tilley MD    [facility] Medicine Team: [facility] HOSP MED F Ian Ochoa PA-C     Patient information was obtained from patient, past medical records and ER records.     Subjective:     Principal Problem:Abdominal pain    Chief Complaint:   Chief Complaint   Patient presents with    Abdominal Pain     Pt c/o abdominal pain, nausea & vomiting since 12 noon.  Pt was admitted at [facility] for same-d/c'd yesterday.  Dx with "functional vomiting or abd migraines"        HPI: Mr. Farris is a 16yo  male with history of migraines, abdominal migraines, s/p laparoscopic cholecystectomy (1/3/18) presenting with acute on chronic, diffuse abdominal pain and vomiting x 2 days. 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 He has symptoms occasionally, but for the last month he has had increasing N/V/cramping. He was admitted to [facility] 02/04 to 2/6/18 with similar complaints. He states and extensive work up was performed, but does not remember specifics. He states "nothing was found except elevated liver enzymes", he improved with zofran, IV pain medications. He tolerated a diet and was discharged home with no prescriptions. A day later he had significant emesis after eating eggs. His abdominal pain is mostly epigastric. His emesis is bilious, no blood. He denies changes to his stools, has daily BM, no constipation or diarrhea. He denies any precipitating factors or triggers. He reports history of "excessive" alcohol use, 10-12 drinks/night while starting college, reports decreasing use over Summer 2017, last alcohol consumption was Super Bowl Sunday where he had 2 beers.  He reports continued bladder and abdominal spams/pain, nausea and vomiting; " denies fever, CP, SOB. 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    Past Medical History:   Diagnosis Date    Abdominal migraine     on Topomax for about one year    Eczema     Migraines        Past Surgical History:   Procedure Laterality Date    ADENOIDECTOMY      CHOLECYSTECTOMY      TONSILLECTOMY         Review of patient's allergies indicates:  No Known Allergies    No current facility-administered medications on file prior to encounter.      No current outpatient prescriptions on file prior to encounter.     Family History     Problem Relation (Age of Onset)    Diabetes Maternal Grandmother, Maternal Grandfather, Paternal Grandmother, Paternal Grandfather    Heart disease Mother, Paternal Grandfather    Hyperlipidemia Paternal Grandfather    Hypertension Maternal Grandmother, Maternal Grandfather, Paternal Grandmother, Paternal Grandfather    Migraines Mother, Sister, Maternal Grandmother    Other Mother        Social History Main Topics    Smoking status: Passive Smoke Exposure - Never Smoker    Smokeless tobacco: Never Used      Comment: Dad smokes outside    Alcohol use 3.0 oz/week     5 Standard drinks or equivalent per week    Drug use: No    Sexual activity: No     Review of Systems   Constitutional: Negative for chills, fatigue, fever and unexpected weight change.   HENT: Negative for congestion, sore throat and trouble swallowing.    Respiratory: Negative for cough, shortness of breath and wheezing.    Cardiovascular: Negative for chest pain and palpitations.   Gastrointestinal: Positive for abdominal pain, nausea and vomiting. Negative for blood in stool, constipation and diarrhea.   Endocrine: Negative for cold intolerance and heat intolerance.   Genitourinary: Negative for difficulty urinating, discharge, dysuria, flank pain and hematuria.   Musculoskeletal: Negative for myalgias and neck pain.   Skin: Negative for rash.   Neurological: Positive for weakness.  Negative for dizziness, light-headedness and headaches.   Hematological: Does not bruise/bleed easily.   Psychiatric/Behavioral: Negative for agitation and behavioral problems.     Objective:     Vital Signs (Most Recent):  Temp: 98.4 °F (36.9 °C) (02/07/18 2327)  Pulse: 75 (02/07/18 2327)  Resp: 16 (02/07/18 2327)  BP: 126/74 (02/07/18 2327)  SpO2: 97 % (02/07/18 2327) Vital Signs (24h Range):  Temp:  [98 °F (36.7 °C)-99.1 °F (37.3 °C)] 98.4 °F (36.9 °C)  Pulse:  [] 75  Resp:  [16-20] 16  SpO2:  [97 %-100 %] 97 %  BP: (126-152)/(74-89) 126/74     Weight: 63.5 kg (140 lb)  Body mass index is 19.53 kg/m².    Physical Exam   Constitutional: He is oriented to person, place, and time. He appears well-developed and well-nourished.   HENT:   Head: Normocephalic and atraumatic.   Right Ear: External ear normal.   Left Ear: External ear normal.   Eyes: Conjunctivae and EOM are normal. Pupils are equal, round, and reactive to light.   Neck: Normal range of motion. Neck supple.   Cardiovascular: Normal rate, regular rhythm, normal heart sounds and intact distal pulses.  Exam reveals no gallop and no friction rub.    No murmur heard.  Pulmonary/Chest: Effort normal and breath sounds normal. He has no wheezes. He has no rales.   Abdominal: Soft. Bowel sounds are normal. He exhibits no distension. There is no rebound and no guarding.   Diffuse TTP   Musculoskeletal: Normal range of motion.   Neurological: He is alert and oriented to person, place, and time.   Skin: Skin is warm and dry.   Psychiatric: He has a normal mood and affect. His behavior is normal. Judgment and thought content normal.   Nursing note and vitals reviewed.        CRANIAL NERVES     CN III, IV, VI   Pupils are equal, round, and reactive to light.  Extraocular motions are normal.        Significant Labs:   CBC:   Recent Labs  Lab 02/07/18  1550   WBC 8.78   HGB 16.3   HCT 45.3        CMP:     Recent Labs  Lab 02/07/18  1550      K 3.4*       CO2 24   *   BUN 14   CREATININE 1.1   CALCIUM 10.6*   PROT 7.9   ALBUMIN 5.0   BILITOT 1.1*   ALKPHOS 165*   AST 97*   ALT 57*   ANIONGAP 14   EGFRNONAA >60.0     Lipase:   Recent Labs  Lab 02/07/18  1550   LIPASE 27     All pertinent labs within the past 24 hours have been reviewed.    Significant Imaging: I have reviewed all pertinent imaging results/findings within the past 24 hours.    Assessment/Plan:     * Abdominal pain    18 yo male with pertinent history of abdominal migraines, s/p lap michelle (1/3/18) presenting with chronic on acute diffuse abdominal pain and vomiting x 2 days.  He was discharged from OSH (2/6/18) after presenting with same symptoms on Sunday 2/4/18, eventually tolerating PO    -Acute on chronic, no clear etiology after extensive work up per patient.  - ?psychological component, ?EtOH gastritis, ?IBS  - Supportive care with hyoscyamine, PPI, probiotic  - consider amitriptyline  - GI consulted with appreciation, keep NPO          VTE Risk Mitigation         Ordered     Low Risk of VTE  Once      02/07/18 7518     Place BURTON hose  Until discontinued      02/07/18 2004             Ian Ochoa PA-C  Department of Hospital Medicine   [facility]

## 2018-02-08 NOTE — ASSESSMENT & PLAN NOTE
18 yo male with pertinent history of abdominal migraines, s/p lap michelle (1/3/18) presenting with chronic on acute diffuse abdominal pain and vomiting x 2 days.  He was discharged from OSH (2/6/18) after presenting with same symptoms on Sunday 2/4/18, eventually tolerating PO    -Acute on chronic, no clear etiology after extensive work up per patient  - Supportive care with hyoscyamine, PPI, probiotic  - GI consulted with appreciation, keep NPO  2/8: GI consulted and rec IgA and TTG to r/o Celiac, serum tryptase, 24 hour urine for metanephrines, VMA, 5-HIAA, spot urine for total PBG, serum heavy metal screen, acute hep panel, TSH and T4; NPO at [facility] for MRCP tomorrow.   - Pt to follow up with Dr. Breaux as an outpatient.

## 2018-02-08 NOTE — PLAN OF CARE
Problem: Patient Care Overview  Goal: Plan of Care Review  Outcome: Ongoing (interventions implemented as appropriate)  Pt's wbc count 8.7 afebrile,urine without evidence of infection, Lactate 2.4 continue to monitor. Complained of pain to abdomen,medicated with levsin,no further complaints of pain. Pt. Remains NPO,IVF started at 125cc hr. Continue plan of care.

## 2018-02-08 NOTE — SUBJECTIVE & OBJECTIVE
Interval History: No acute events overnight. This AM, pt sitting upright in bed on phone with mother and family at bedside. Pt reports improved sx; denies further pain and N/V. Discussed plan of care with patient and mother.    Review of Systems   Constitutional: Negative for chills, diaphoresis, fatigue, fever and unexpected weight change.   Respiratory: Negative for cough, shortness of breath and wheezing.    Cardiovascular: Negative for chest pain and palpitations.   Gastrointestinal: Positive for abdominal pain, nausea and vomiting. Negative for abdominal distention, blood in stool, constipation and diarrhea.   Neurological: Positive for weakness. Negative for dizziness, light-headedness and headaches.   Psychiatric/Behavioral: Negative for agitation and behavioral problems.     Objective:     Vital Signs (Most Recent):  Temp: 96.2 °F (35.7 °C) (02/08/18 1252)  Pulse: (!) 58 (02/08/18 1252)  Resp: 17 (02/08/18 1252)  BP: 121/63 (02/08/18 1252)  SpO2: 99 % (02/08/18 1252) Vital Signs (24h Range):  Temp:  [96.2 °F (35.7 °C)-99.1 °F (37.3 °C)] 96.2 °F (35.7 °C)  Pulse:  [] 58  Resp:  [12-20] 17  SpO2:  [97 %-100 %] 99 %  BP: (100-166)/(53-95) 121/63     Weight: 63.5 kg (140 lb 1 oz)  Body mass index is 19.53 kg/m².    Intake/Output Summary (Last 24 hours) at 02/08/18 1341  Last data filed at 02/08/18 1230   Gross per 24 hour   Intake             3125 ml   Output             2700 ml   Net              425 ml      Physical Exam   Constitutional: He is oriented to person, place, and time. He appears well-developed and well-nourished. No distress.   Cardiovascular: Normal rate, regular rhythm, normal heart sounds and intact distal pulses.  Exam reveals no gallop and no friction rub.    No murmur heard.  Pulmonary/Chest: Effort normal and breath sounds normal. He has no wheezes. He has no rales.   Abdominal: Soft. Bowel sounds are normal. He exhibits no distension. There is no rebound and no guarding.   No  tenderness today   Musculoskeletal: Normal range of motion. He exhibits no edema or tenderness.   Neurological: He is alert and oriented to person, place, and time. No cranial nerve deficit.   Skin: Skin is warm and dry. He is not diaphoretic.   Psychiatric: He has a normal mood and affect. His behavior is normal.   Nursing note and vitals reviewed.      Significant Labs: All pertinent labs within the past 24 hours have been reviewed.    Significant Imaging: I have reviewed all pertinent imaging results/findings within the past 24 hours.

## 2018-02-08 NOTE — HPI
"Mr. Farris is a 16yo  male with history of migraines, abdominal migraines, s/p laparoscopic cholecystectomy (1/3/18) presenting with acute on chronic, diffuse abdominal pain and vomiting x 2 days. 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 He has symptoms occasionally, but for the last month he has had increasing N/V/cramping. He was admitted to [facility] 02/04 to 2/6/18 with similar complaints. He states and extensive work up was performed, but does not remember specifics. He states "nothing was found except elevated liver enzymes", he improved with zofran, IV pain medications. He tolerated a diet and was discharged home with no prescriptions. A day later he had significant emesis after eating eggs. His abdominal pain is mostly epigastric. His emesis is bilious, no blood. He denies changes to his stools, has daily BM, no constipation or diarrhea. He denies any precipitating factors or triggers. He reports history of "excessive" alcohol use, 10-12 drinks/night while starting college, reports decreasing use over Summer 2017, last alcohol consumption was Super Bowl Sunday where he had 2 beers.  He reports continued bladder and abdominal spams/pain, nausea and vomiting; denies fever, CP, SOB. 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  "

## 2018-02-08 NOTE — PLAN OF CARE
Problem: Patient Care Overview  Goal: Plan of Care Review  Outcome: Ongoing (interventions implemented as appropriate)  Pt aaox3, vss, no s/s of distress noted.  Pt denies pain at this time.  IVF infusing.  Call light within reach.  Mother at bedside.

## 2018-02-08 NOTE — CONSULTS
[facility]  Gastroenterology  Consult Note    Patient Name: Arian Farris  MRN: [id number]  Admission Date: 2/7/2018  Hospital Length of Stay: 0 days  Code Status: Full Code   Attending Provider: Radha Phoenix MD   Consulting Provider: Donnie Yousif MD  Primary Care Physician: Moises Tilley MD  Principal Problem:Abdominal pain    Inpatient consult to Gastroenterology  Consult performed by: DONNIE YOUSIF  Consult ordered by: THERESE CORTÉS        Subjective:     HPI:  Arian Farris is a 17 year old male with past medical history of eczema, migraines who presents to the ED with abdominal pain and vomiting, s/p laparoscopic cholecystectomy (1/3/18) presenting with acute on chronic, diffuse abdominal pain and vomiting x 2 days. 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 Over the last month he has had increasing N/V/cramping. Has regular bowel movements. States the pain is significant, located over the LUQ and epigastrium, lasts approximately 1 hour when it comes and normally abates on its own. He was admitted to [facility] 02/04 to 2/6/18 with similar complaints. He states an extensive work up was performed, but does not remember specifics. To his knowledge, nothing was found except transaminitis however he does state he improved with zofran, IV pain medications. He tolerated a diet and was discharged home with no prescriptions. A day later he had significant emesis after eating eggs. Denies hematemesis, hematochezia, fevers, chills. Was scheduled to have an appt in GI clinic today with Dr. Breaux. Gastroenterology consulted for acute on chronic abdominal pain.    Pertinent GI History:  *EGD 12/22/17:  Impression:   - Normal esophagus.                        - Normal stomach.                        - Normal examined duodenum. Biopsied. Normal.    Past Medical History:   Diagnosis Date    Abdominal migraine     on Topomax for about one  year    Eczema     Migraines        Past Surgical History:   Procedure Laterality Date    ADENOIDECTOMY      CHOLECYSTECTOMY      TONSILLECTOMY         Review of patient's allergies indicates:  No Known Allergies  Family History     Problem Relation (Age of Onset)    Diabetes Maternal Grandmother, Maternal Grandfather, Paternal Grandmother, Paternal Grandfather    Heart disease Mother, Paternal Grandfather    Hyperlipidemia Paternal Grandfather    Hypertension Maternal Grandmother, Maternal Grandfather, Paternal Grandmother, Paternal Grandfather    Migraines Mother, Sister, Maternal Grandmother    Other Mother        Social History Main Topics    Smoking status: Passive Smoke Exposure - Never Smoker    Smokeless tobacco: Never Used      Comment: Dad smokes outside    Alcohol use 3.0 oz/week     5 Standard drinks or equivalent per week    Drug use: No    Sexual activity: No     Review of Systems   Constitutional: Negative for chills, fatigue, fever and unexpected weight change.   HENT: Negative for congestion, sore throat and trouble swallowing.    Respiratory: Negative for cough, shortness of breath and wheezing.    Cardiovascular: Negative for chest pain and palpitations.   Gastrointestinal: Positive for abdominal pain, nausea and vomiting. Negative for blood in stool, constipation and diarrhea.   Endocrine: Negative for cold intolerance and heat intolerance.   Genitourinary: Negative for difficulty urinating and dysuria.   Musculoskeletal: Negative for myalgias and neck pain.   Skin: Negative for rash.   Neurological: Negative for dizziness, light-headedness and headaches.   Hematological: Does not bruise/bleed easily.   Psychiatric/Behavioral: Negative for agitation and behavioral problems.     Objective:     Vital Signs (Most Recent):  Temp: 96.9 °F (36.1 °C) (02/08/18 0743)  Pulse: 60 (02/08/18 0743)  Resp: 17 (02/08/18 0743)  BP: (!) 100/53 (02/08/18 0743)  SpO2: 98 % (02/08/18 0743) Vital Signs (24h  Range):  Temp:  [96.9 °F (36.1 °C)-99.1 °F (37.3 °C)] 96.9 °F (36.1 °C)  Pulse:  [] 60  Resp:  [12-20] 17  SpO2:  [97 %-100 %] 98 %  BP: (100-166)/(53-95) 100/53     Weight: 63.5 kg (140 lb 1 oz) (02/07/18 2241)  Body mass index is 19.53 kg/m².      Intake/Output Summary (Last 24 hours) at 02/08/18 0857  Last data filed at 02/08/18 0600   Gross per 24 hour   Intake             3125 ml   Output             1900 ml   Net             1225 ml       Lines/Drains/Airways     Peripheral Intravenous Line                 Peripheral IV - Single Lumen 02/07/18 1857 Left Antecubital less than 1 day                Physical Exam   Constitutional: He is oriented to person, place, and time. He appears well-developed and well-nourished.   HENT:   Head: Normocephalic and atraumatic.   Neck: Normal range of motion.   Cardiovascular: Normal rate, regular rhythm and normal heart sounds.  Exam reveals no gallop and no friction rub.    No murmur heard.  Pulmonary/Chest: Effort normal and breath sounds normal. No respiratory distress. He has no wheezes. He has no rales.   Abdominal: Soft. There is tenderness (Mostly Epigastric).   Musculoskeletal: He exhibits no edema.   Neurological: He is alert and oriented to person, place, and time.   Skin: Skin is warm and dry.       Significant Labs:  CBC:   Recent Labs  Lab 02/07/18  1550 02/08/18  0549   WBC 8.78 7.40   HGB 16.3 13.7*   HCT 45.3 38.4*    129*       Significant Imaging:  Imaging results within the past 24 hours have been reviewed.    Assessment/Plan:     * Abdominal pain    Patient is a 18 yo male with pertinent h/o eczema, migraines who presents to the ED with abdominal pain and vomiting, s/p laparoscopic cholecystectomy (1/3/18) presenting with acute on chronic, diffuse abdominal pain and vomiting x 2 days. Gastroenterology consulted for acute on chronic abdominal pain.    -- Ddx includes acute intermittent porphyria, C1 esterase deficiency, pheochromocytoma, celiac  disease, mastocytosis, cyclic vomiting syndrome, thyroid abnormalities.  -- IgA, TTG to r/o Celiac Disease.  -- Serum tryptase.  -- 24 hour urine for metanephrines, VMA, 5-HIAA.  -- Spot urine for total PBG.  -- Serum heavy metal screen.  -- Check TSH, T4.  -- Thank you for your consult, will follow along closely.  - Patient will follow up in GI clinic with Dr. Paul Breaux as previously scheduled.            Thank you for your consult. I will follow-up with patient. Please contact us if you have any additional questions.    Donnie Yousif MD  Gastroenterology  [facility]  I was present with the resident and Dr. Mika Nascimento GI fellow during the above evaluation, including history and exam.  I discussed the case with the resident and fellow and agree with the findings and plan as documented in the resident's note.

## 2018-02-08 NOTE — PROGRESS NOTES
"[facility] Hospital Medicine  Progress Note    Patient Name: Arian Farris  MRN: [id number]  Patient Class: OP- Observation   Admission Date: 2/7/2018  Length of Stay: 0 days  Attending Physician: Radha Phoenix MD  Primary Care Provider: Moises Tilley MD    [facility] Medicine Team: [facility] MED  Marianne Pereira PA-C    Subjective:     Principal Problem:Abdominal pain    HPI:  Mr. Farris is a 16yo  male with history of migraines, abdominal migraines, s/p laparoscopic cholecystectomy (1/3/18) presenting with acute on chronic, diffuse abdominal pain and vomiting x 2 days. 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 He has symptoms occasionally, but for the last month he has had increasing N/V/cramping. He was admitted to [facility] 02/04 to 2/6/18 with similar complaints. He states and extensive work up was performed, but does not remember specifics. He states "nothing was found except elevated liver enzymes", he improved with zofran, IV pain medications. He tolerated a diet and was discharged home with no prescriptions. A day later he had significant emesis after eating eggs. His abdominal pain is mostly epigastric. His emesis is bilious, no blood. He denies changes to his stools, has daily BM, no constipation or diarrhea. He denies any precipitating factors or triggers. He reports history of "excessive" alcohol use, 10-12 drinks/night while starting college, reports decreasing use over Summer 2017, last alcohol consumption was Super Bowl Sunday where he had 2 beers.  He reports continued bladder and abdominal spams/pain, nausea and vomiting; denies fever, CP, SOB. 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    Hospital Course:  Pt admitted to observation for acute on chronic abdominal pain; recently discharged from [facility] (CM to obtain records). GI consulted and rec IgA and TTG to r/o Celiac, serum " tryptase, 24 hour urine for metanephrines, VMA, 5-HIAA, spot urine for total PBG, serum heavy metal screen, acute hep panel, TSH and T4. NPO at [facility] for MRCP tomorrow. Pt to follow up with Dr. Breaux as an outpatient.     Interval History: No acute events overnight. This AM, pt sitting upright in bed on phone with mother and family at bedside. Pt reports improved sx; denies further pain and N/V. Discussed plan of care with patient and mother.    Review of Systems   Constitutional: Negative for chills, diaphoresis, fatigue, fever and unexpected weight change.   Respiratory: Negative for cough, shortness of breath and wheezing.    Cardiovascular: Negative for chest pain and palpitations.   Gastrointestinal: Positive for abdominal pain, nausea and vomiting. Negative for abdominal distention, blood in stool, constipation and diarrhea.   Neurological: Positive for weakness. Negative for dizziness, light-headedness and headaches.   Psychiatric/Behavioral: Negative for agitation and behavioral problems.     Objective:     Vital Signs (Most Recent):  Temp: 96.2 °F (35.7 °C) (02/08/18 1252)  Pulse: (!) 58 (02/08/18 1252)  Resp: 17 (02/08/18 1252)  BP: 121/63 (02/08/18 1252)  SpO2: 99 % (02/08/18 1252) Vital Signs (24h Range):  Temp:  [96.2 °F (35.7 °C)-99.1 °F (37.3 °C)] 96.2 °F (35.7 °C)  Pulse:  [] 58  Resp:  [12-20] 17  SpO2:  [97 %-100 %] 99 %  BP: (100-166)/(53-95) 121/63     Weight: 63.5 kg (140 lb 1 oz)  Body mass index is 19.53 kg/m².    Intake/Output Summary (Last 24 hours) at 02/08/18 1341  Last data filed at 02/08/18 1230   Gross per 24 hour   Intake             3125 ml   Output             2700 ml   Net              425 ml      Physical Exam   Constitutional: He is oriented to person, place, and time. He appears well-developed and well-nourished. No distress.   Cardiovascular: Normal rate, regular rhythm, normal heart sounds and intact distal pulses.  Exam reveals no gallop and no friction rub.    No murmur  heard.  Pulmonary/Chest: Effort normal and breath sounds normal. He has no wheezes. He has no rales.   Abdominal: Soft. Bowel sounds are normal. He exhibits no distension. There is no rebound and no guarding.   No tenderness today   Musculoskeletal: Normal range of motion. He exhibits no edema or tenderness.   Neurological: He is alert and oriented to person, place, and time. No cranial nerve deficit.   Skin: Skin is warm and dry. He is not diaphoretic.   Psychiatric: He has a normal mood and affect. His behavior is normal.   Nursing note and vitals reviewed.      Significant Labs: All pertinent labs within the past 24 hours have been reviewed.    Significant Imaging: I have reviewed all pertinent imaging results/findings within the past 24 hours.    Assessment/Plan:      * Abdominal pain    18 yo male with pertinent history of abdominal migraines, s/p lap michelle (1/3/18) presenting with chronic on acute diffuse abdominal pain and vomiting x 2 days.  He was discharged from OSH (2/6/18) after presenting with same symptoms on Sunday 2/4/18, eventually tolerating PO    -Acute on chronic, no clear etiology after extensive work up per patient  - Supportive care with hyoscyamine, PPI, probiotic  - GI consulted with appreciation, keep NPO  2/8: GI consulted and rec IgA and TTG to r/o Celiac, serum tryptase, 24 hour urine for metanephrines, VMA, 5-HIAA, spot urine for total PBG, serum heavy metal screen, acute hep panel, TSH and T4; NPO at [facility] for MRCP tomorrow.   - Pt to follow up with Dr. Breaux as an outpatient.           VTE Risk Mitigation         Ordered     Low Risk of VTE  Once      02/07/18 2319     Place BURTON hose  Until discontinued      02/07/18 Ashley Pereira PA-C  Department of Hospital Medicine   [facility]  Discussed with staff: Dr. Phoenix

## 2018-02-08 NOTE — PLAN OF CARE
02/08/18 1135   Discharge Assessment   Assessment Type Discharge Planning Assessment   Confirmed/corrected address and phone number on facesheet? Yes   Assessment information obtained from? Patient   Expected Length of Stay (days) 3   Communicated expected length of stay with patient/caregiver yes   Prior to hospitilization cognitive status: Alert/Oriented   Prior to hospitalization functional status: Independent   Current cognitive status: Alert/Oriented   Current Functional Status: Independent   Lives With parent(s)  (motherKay ([phone]))   Able to Return to Prior Arrangements yes   Is patient able to care for self after discharge? Yes   Patient's perception of discharge disposition home or selfcare   Readmission Within The Last 30 Days no previous admission in last 30 days   Patient currently being followed by outpatient case management? No   Patient currently receives any other outside agency services? No   Equipment Currently Used at Home none   Do you have any problems affording any of your prescribed medications? No   Is the patient taking medications as prescribed? yes   Does the patient have transportation home? Yes   Transportation Available family or friend will provide  (Kay han)   Does the patient receive services at the [facility]? No   Discharge Plan A Home with family   Discharge Plan B Home Health   Patient/Family In Agreement With Plan yes     Patient awake & alert in bed with motherKay, at the bedside when CM rounded. Patient was admitted with nausea, vomiting, & abdominal pain. GI consult noted. 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 Patient is independent of all ADLs. Plan to discharge patient home with support or home with home health when medically stable. Kay stated that she will provide transportation at time of discharge. Patient sees Dr. Moises Tilley (PCP) in [place] but requested that his hospital follow  up appointment be scheduled with an MD at the [facility] in [place]. Hospital follow up appointment scheduled for the patient with Dr. Chandana Jo in [place] on 2/14/18 at 1300. Message sent to Dr. Breaux's  (GI) nurse informing that patient missed appointment today at 1400 due to current hospitalization & requested that the appointment be rescheduled in 1 week. Dr. Breaux's nurse to call the patient with new appointment date & time. Will continue to follow.

## 2018-02-08 NOTE — ASSESSMENT & PLAN NOTE
Patient is a 16 yo male with pertinent h/o eczema, migraines who presents to the ED with abdominal pain and vomiting, s/p laparoscopic cholecystectomy (1/3/18) presenting with acute on chronic, diffuse abdominal pain and vomiting x 2 days.

## 2018-02-08 NOTE — HOSPITAL COURSE
Pt admitted to observation for acute on chronic abdominal pain; recently discharged from  General for same sx. GI consulted and rec IgA and TTG to r/o Celiac, serum tryptase, 24 hour urine for metanephrines, VMA, 5-HIAA, spot urine for total PBG, serum heavy metal screen, acute hep panel (negative), AM cortisol (4.2), TSH (WNL) and T4 (WNL). MRCP shows no significant intrahepatic biliary ductal dilatation, no focal filling defects identified within the common bile duct which appears normal in caliber. GI suspecting cyclic vomiting syndrome; rec adding elavil 25 mg qhs, Levsin sublingual and phenergan before meals, rectal phenergan PRN; AVOID chocolate, MSG, and cheese. Pt discharged home after tolerating diet. Placed amb ref to endocrine per GI recs. Pt to follow up with Dr. Breaux as an outpatient.

## 2018-02-08 NOTE — ASSESSMENT & PLAN NOTE
18 yo male with pertinent history of abdominal migraines, s/p lap michelle (1/3/18) presenting with chronic on acute diffuse abdominal pain and vomiting x 2 days.  He was discharged from OSH (2/6/18) after presenting with same symptoms on Sunday 2/4/18, eventually tolerating PO    -Acute on chronic, no clear etiology after extensive work up per patient.  - ?psychological component, ?EtOH gastritis, ?IBS  - Supportive care with hyoscyamine, PPI, probiotic  - consider amitriptyline  - GI consulted with appreciation, keep NPO

## 2018-02-08 NOTE — HPI
Arian Farris is a 17 year old male with past medical history of eczema, migraines who presents to the ED with abdominal pain and vomiting, s/p laparoscopic cholecystectomy (1/3/18) presenting with acute on chronic, diffuse abdominal pain and vomiting x 2 days. 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 Over the last month he has had increasing N/V/cramping. Has regular bowel movements. States the pain is significant, located over the LUQ and epigastrium, lasts approximately 1 hour when it comes and normally abates on its own. He was admitted to [facility] 02/04 to 2/6/18 with similar complaints. He states an extensive work up was performed, but does not remember specifics. To his knowledge, nothing was found except transaminitis however he does state he improved with zofran, IV pain medications. He tolerated a diet and was discharged home with no prescriptions. A day later he had significant emesis after eating eggs. Denies hematemesis, hematochezia, fevers, chills. Was scheduled to have an appt in GI clinic today with Dr. Breaux. Gastroenterology consulted for acute on chronic abdominal pain.    Pertinent GI History:  *EGD 12/22/17:  Impression:   - Normal esophagus.                        - Normal stomach.                        - Normal examined duodenum. Biopsied. Normal.

## 2018-02-09 ENCOUNTER — TELEPHONE (OUTPATIENT)
Dept: ENDOSCOPY | Facility: HOSPITAL | Age: 18
End: 2018-02-09

## 2018-02-09 DIAGNOSIS — R79.89 LOW SERUM CORTISOL LEVEL: Primary | ICD-10-CM

## 2018-02-09 LAB
ANION GAP SERPL CALC-SCNC: 10 MMOL/L
ARSENIC BLD-MCNC: <1 NG/ML (ref 0–12)
BASOPHILS # BLD AUTO: 0.03 K/UL
BASOPHILS NFR BLD: 0.5 %
BUN SERPL-MCNC: 8 MG/DL
C4 SERPL-MCNC: 15 MG/DL
CADMIUM BLD-MCNC: <0.2 NG/ML (ref 0–4.9)
CALCIUM SERPL-MCNC: 9.1 MG/DL
CHLORIDE SERPL-SCNC: 107 MMOL/L
CITY: NORMAL
CO2 SERPL-SCNC: 23 MMOL/L
CORTIS SERPL-MCNC: 4.2 UG/DL
COUNTY: NORMAL
CREAT SERPL-MCNC: 0.8 MG/DL
DIFFERENTIAL METHOD: ABNORMAL
EOSINOPHIL # BLD AUTO: 0.2 K/UL
EOSINOPHIL NFR BLD: 2.6 %
ERYTHROCYTE [DISTWIDTH] IN BLOOD BY AUTOMATED COUNT: 12.7 %
EST. GFR  (AFRICAN AMERICAN): >60 ML/MIN/1.73 M^2
EST. GFR  (NON AFRICAN AMERICAN): >60 ML/MIN/1.73 M^2
GLUCOSE SERPL-MCNC: 79 MG/DL
GUARDIAN FIRST NAME: NORMAL
GUARDIAN LAST NAME: NORMAL
HAV IGM SERPL QL IA: NEGATIVE
HBV CORE IGM SERPL QL IA: NEGATIVE
HBV SURFACE AG SERPL QL IA: NEGATIVE
HCT VFR BLD AUTO: 38.9 %
HCV AB SERPL QL IA: NEGATIVE
HGB BLD-MCNC: 13.7 G/DL
HOME PHONE: NORMAL
IMM GRANULOCYTES # BLD AUTO: 0.02 K/UL
IMM GRANULOCYTES NFR BLD AUTO: 0.3 %
LEAD BLD-MCNC: <1 MCG/DL (ref 0–4.9)
LYMPHOCYTES # BLD AUTO: 2.2 K/UL
LYMPHOCYTES NFR BLD: 38.8 %
MAGNESIUM SERPL-MCNC: 2.2 MG/DL
MCH RBC QN AUTO: 29.5 PG
MCHC RBC AUTO-ENTMCNC: 35.2 G/DL
MCV RBC AUTO: 84 FL
MERCURY BLD-MCNC: <1 NG/ML (ref 0–9)
MONOCYTES # BLD AUTO: 0.6 K/UL
MONOCYTES NFR BLD: 10.3 %
NEUTROPHILS # BLD AUTO: 2.7 K/UL
NEUTROPHILS NFR BLD: 47.5 %
NRBC BLD-RTO: 0 /100 WBC
PHOSPHATE SERPL-MCNC: 2.9 MG/DL
PLATELET # BLD AUTO: 140 K/UL
PMV BLD AUTO: 9.1 FL
POTASSIUM SERPL-SCNC: 3.9 MMOL/L
RACE: NORMAL
RBC # BLD AUTO: 4.65 M/UL
SODIUM SERPL-SCNC: 140 MMOL/L
STATE: NORMAL
STREET ADDRESS: NORMAL
TRYPTASE LEVEL: 2.5 NG/ML
VENOUS/CAPILLARY: NORMAL
WBC # BLD AUTO: 5.72 K/UL
ZIP: NORMAL

## 2018-02-09 PROCEDURE — 25000003 PHARM REV CODE 250: Performed by: PHYSICIAN ASSISTANT

## 2018-02-09 PROCEDURE — 86160 COMPLEMENT ANTIGEN: CPT

## 2018-02-09 PROCEDURE — 86161 COMPLEMENT/FUNCTION ACTIVITY: CPT

## 2018-02-09 PROCEDURE — 99225 PR SUBSEQUENT OBSERVATION CARE,LEVEL II: CPT | Mod: ,,, | Performed by: PHYSICIAN ASSISTANT

## 2018-02-09 PROCEDURE — 63600175 PHARM REV CODE 636 W HCPCS: Performed by: HOSPITALIST

## 2018-02-09 PROCEDURE — 63600175 PHARM REV CODE 636 W HCPCS: Performed by: PHYSICIAN ASSISTANT

## 2018-02-09 PROCEDURE — 80048 BASIC METABOLIC PNL TOTAL CA: CPT

## 2018-02-09 PROCEDURE — 83883 ASSAY NEPHELOMETRY NOT SPEC: CPT

## 2018-02-09 PROCEDURE — 84100 ASSAY OF PHOSPHORUS: CPT

## 2018-02-09 PROCEDURE — 25000003 PHARM REV CODE 250: Performed by: HOSPITALIST

## 2018-02-09 PROCEDURE — 83735 ASSAY OF MAGNESIUM: CPT

## 2018-02-09 PROCEDURE — G0378 HOSPITAL OBSERVATION PER HR: HCPCS

## 2018-02-09 PROCEDURE — 36415 COLL VENOUS BLD VENIPUNCTURE: CPT

## 2018-02-09 PROCEDURE — 85025 COMPLETE CBC W/AUTO DIFF WBC: CPT

## 2018-02-09 PROCEDURE — 82533 TOTAL CORTISOL: CPT

## 2018-02-09 PROCEDURE — 83516 IMMUNOASSAY NONANTIBODY: CPT | Mod: 59

## 2018-02-09 PROCEDURE — 84585 ASSAY OF URINE VMA: CPT

## 2018-02-09 PROCEDURE — 83497 ASSAY OF 5-HIAA: CPT

## 2018-02-09 PROCEDURE — 83835 ASSAY OF METANEPHRINES: CPT

## 2018-02-09 RX ORDER — KETOROLAC TROMETHAMINE 15 MG/ML
15 INJECTION, SOLUTION INTRAMUSCULAR; INTRAVENOUS EVERY 6 HOURS PRN
Status: DISCONTINUED | OUTPATIENT
Start: 2018-02-09 | End: 2018-02-10 | Stop reason: HOSPADM

## 2018-02-09 RX ORDER — PROMETHAZINE HYDROCHLORIDE 12.5 MG/1
12.5 SUPPOSITORY RECTAL EVERY 6 HOURS PRN
Status: DISCONTINUED | OUTPATIENT
Start: 2018-02-09 | End: 2018-02-10 | Stop reason: HOSPADM

## 2018-02-09 RX ORDER — ONDANSETRON 2 MG/ML
4 INJECTION INTRAMUSCULAR; INTRAVENOUS EVERY 4 HOURS
Status: DISCONTINUED | OUTPATIENT
Start: 2018-02-09 | End: 2018-02-10

## 2018-02-09 RX ORDER — RAMELTEON 8 MG/1
8 TABLET ORAL NIGHTLY PRN
Status: DISCONTINUED | OUTPATIENT
Start: 2018-02-09 | End: 2018-02-10 | Stop reason: HOSPADM

## 2018-02-09 RX ORDER — OXYCODONE AND ACETAMINOPHEN 5; 325 MG/1; MG/1
1 TABLET ORAL EVERY 4 HOURS PRN
Status: DISCONTINUED | OUTPATIENT
Start: 2018-02-09 | End: 2018-02-10 | Stop reason: HOSPADM

## 2018-02-09 RX ADMIN — HYOSCYAMINE SULFATE 0.12 MG: 0.12 TABLET ORAL; SUBLINGUAL at 04:02

## 2018-02-09 RX ADMIN — Medication 1 CAPSULE: at 08:02

## 2018-02-09 RX ADMIN — PROMETHAZINE HYDROCHLORIDE 25 MG: 25 INJECTION INTRAMUSCULAR; INTRAVENOUS at 10:02

## 2018-02-09 RX ADMIN — ONDANSETRON 4 MG: 2 INJECTION INTRAMUSCULAR; INTRAVENOUS at 09:02

## 2018-02-09 RX ADMIN — PANTOPRAZOLE SODIUM 40 MG: 40 TABLET, DELAYED RELEASE ORAL at 08:02

## 2018-02-09 RX ADMIN — SODIUM CHLORIDE: 0.9 INJECTION, SOLUTION INTRAVENOUS at 05:02

## 2018-02-09 RX ADMIN — PROMETHAZINE HYDROCHLORIDE 12.5 MG: 12.5 SUPPOSITORY RECTAL at 07:02

## 2018-02-09 RX ADMIN — KETOROLAC TROMETHAMINE 15 MG: 15 INJECTION, SOLUTION INTRAMUSCULAR; INTRAVENOUS at 05:02

## 2018-02-09 RX ADMIN — ONDANSETRON 4 MG: 2 INJECTION INTRAMUSCULAR; INTRAVENOUS at 04:02

## 2018-02-09 RX ADMIN — SODIUM CHLORIDE: 0.9 INJECTION, SOLUTION INTRAVENOUS at 08:02

## 2018-02-09 NOTE — PLAN OF CARE
CM received medical records [place] from [place]. Records hand delivered to FESTUS Pereira ([id number] as requested. Will continue to follow.

## 2018-02-09 NOTE — PLAN OF CARE
Problem: Patient Care Overview  Goal: Plan of Care Review  Outcome: Ongoing (interventions implemented as appropriate)  Afebrile,VSS,no signs of infection continue to monitor. No verbal complaints of pain. IVF maintained,tolerating regular diet without nausea or vomiting. Continue current plan of care.

## 2018-02-09 NOTE — ASSESSMENT & PLAN NOTE
16 yo male with pertinent history of abdominal migraines, s/p lap michelle (1/3/18) presenting with chronic on acute diffuse abdominal pain and vomiting x 2 days.  He was discharged from OSH (2/6/18) after presenting with same symptoms on Sunday 2/4/18, eventually tolerating PO    -Acute on chronic, no clear etiology after extensive work up per patient  - Supportive care with hyoscyamine, PPI, probiotic  - GI consulted with appreciation, keep NPO  2/8: GI consulted and rec IgA (36) and TTG to r/o Celiac, serum tryptase, 24 hour urine for metanephrines, VMA, 5-HIAA, spot urine for total PBG, serum heavy metal screen, acute hep panel, AM cortisol, TSH (WNL) and T4 (WNL). NPO at [facility] for MRCP tomorrow.   2/9: MRCP shows no significant intrahepatic biliary ductal dilatation, no focal filling defects identified within the common bile duct which appears normal in caliber. Pt to be discharged after 24 hour urine collection completed per GI recs. Further episodes of N/V with abdominal pain this afternoon s/p smoothie. Continue supportive care, antiemetics (IV, PO, rectal), and analgesics PRN.   - Pt to follow up with Dr. Breaux as an outpatient.

## 2018-02-09 NOTE — TREATMENT PLAN
GI Follow-up Note    Pt seen / examined today  Mom at bedside.  No acute events overnight.  He didn't eat breakfast.  Wants to try some food for llunch , perhaps non hospital food.    Mom is interested in testing for MPHFR and 'leaky gut'    Vitals:    02/09/18 0809   BP: 119/65   Pulse: 63   Resp: 16   Temp: 96.1 °F (35.6 °C)       Gen: NAD, pleasant  Abd: soft , NT ND    Chart reviewed.    Studies reviewed.   MRCP reviewed.  AM cortisol low. He is unsure if he received outpatient steroids for these symptoms.    Plan:  Advance diet as tolerated  Complete 24 hr urine and ok to discharge if tolerating diet  Family has arranged for outpatient follow up with Dr. Breaux already.  Recommend outpatient follow up with endocrine for low cortisol and possible outpatient raymond stim testing or further workup at that time.      Will sign off at this time.  Please call with any additional concerns /questions    Mika Nascimento MD  Gastroenterology Fellow (PGY-V)  Pager: [phone]

## 2018-02-10 VITALS
RESPIRATION RATE: 16 BRPM | TEMPERATURE: 97 F | SYSTOLIC BLOOD PRESSURE: 105 MMHG | OXYGEN SATURATION: 99 % | BODY MASS INDEX: 19.61 KG/M2 | HEART RATE: 66 BPM | WEIGHT: 140.06 LBS | HEIGHT: 71 IN | DIASTOLIC BLOOD PRESSURE: 61 MMHG

## 2018-02-10 LAB
ANION GAP SERPL CALC-SCNC: 8 MMOL/L
BASOPHILS # BLD AUTO: 0.03 K/UL
BASOPHILS NFR BLD: 0.4 %
BUN SERPL-MCNC: 7 MG/DL
C1INH SERPL-MCNC: 24 MG/DL
CALCIUM SERPL-MCNC: 9.2 MG/DL
CHLORIDE SERPL-SCNC: 107 MMOL/L
CO2 SERPL-SCNC: 23 MMOL/L
CREAT SERPL-MCNC: 1 MG/DL
DIFFERENTIAL METHOD: ABNORMAL
EOSINOPHIL # BLD AUTO: 0 K/UL
EOSINOPHIL NFR BLD: 0.5 %
ERYTHROCYTE [DISTWIDTH] IN BLOOD BY AUTOMATED COUNT: 12.3 %
EST. GFR  (AFRICAN AMERICAN): >60 ML/MIN/1.73 M^2
EST. GFR  (NON AFRICAN AMERICAN): >60 ML/MIN/1.73 M^2
GLUCOSE SERPL-MCNC: 91 MG/DL
HCT VFR BLD AUTO: 39.5 %
HGB BLD-MCNC: 14.1 G/DL
IMM GRANULOCYTES # BLD AUTO: 0.02 K/UL
IMM GRANULOCYTES NFR BLD AUTO: 0.3 %
LYMPHOCYTES # BLD AUTO: 1.5 K/UL
LYMPHOCYTES NFR BLD: 19.6 %
MAGNESIUM SERPL-MCNC: 2 MG/DL
MCH RBC QN AUTO: 29.2 PG
MCHC RBC AUTO-ENTMCNC: 35.7 G/DL
MCV RBC AUTO: 82 FL
MONOCYTES # BLD AUTO: 1 K/UL
MONOCYTES NFR BLD: 12.2 %
NEUTROPHILS # BLD AUTO: 5.2 K/UL
NEUTROPHILS NFR BLD: 67 %
NRBC BLD-RTO: 0 /100 WBC
PHOSPHATE SERPL-MCNC: 5 MG/DL
PLATELET # BLD AUTO: 159 K/UL
PMV BLD AUTO: 9.1 FL
POTASSIUM SERPL-SCNC: 4 MMOL/L
RBC # BLD AUTO: 4.83 M/UL
SODIUM SERPL-SCNC: 138 MMOL/L
WBC # BLD AUTO: 7.79 K/UL

## 2018-02-10 PROCEDURE — 84110 ASSAY OF PORPHOBILINOGEN: CPT

## 2018-02-10 PROCEDURE — 83735 ASSAY OF MAGNESIUM: CPT

## 2018-02-10 PROCEDURE — 25000003 PHARM REV CODE 250: Performed by: PHYSICIAN ASSISTANT

## 2018-02-10 PROCEDURE — 84100 ASSAY OF PHOSPHORUS: CPT

## 2018-02-10 PROCEDURE — 85025 COMPLETE CBC W/AUTO DIFF WBC: CPT

## 2018-02-10 PROCEDURE — 93010 ELECTROCARDIOGRAM REPORT: CPT | Mod: ,,, | Performed by: INTERNAL MEDICINE

## 2018-02-10 PROCEDURE — G0378 HOSPITAL OBSERVATION PER HR: HCPCS

## 2018-02-10 PROCEDURE — 80048 BASIC METABOLIC PNL TOTAL CA: CPT

## 2018-02-10 PROCEDURE — 36415 COLL VENOUS BLD VENIPUNCTURE: CPT

## 2018-02-10 PROCEDURE — 63600175 PHARM REV CODE 636 W HCPCS: Performed by: PHYSICIAN ASSISTANT

## 2018-02-10 PROCEDURE — 99217 PR OBSERVATION CARE DISCHARGE: CPT | Mod: ,,, | Performed by: PHYSICIAN ASSISTANT

## 2018-02-10 RX ORDER — HYOSCYAMINE SULFATE 0.12 MG/1
0.12 TABLET SUBLINGUAL 3 TIMES DAILY
Status: DISCONTINUED | OUTPATIENT
Start: 2018-02-10 | End: 2018-02-10

## 2018-02-10 RX ORDER — PROMETHAZINE HYDROCHLORIDE 12.5 MG/1
12.5 SUPPOSITORY RECTAL EVERY 6 HOURS PRN
Qty: 30 SUPPOSITORY | Refills: 0 | Status: SHIPPED | OUTPATIENT
Start: 2018-02-10 | End: 2018-02-14

## 2018-02-10 RX ORDER — PROCHLORPERAZINE 25 MG
25 SUPPOSITORY, RECTAL RECTAL EVERY 12 HOURS PRN
Qty: 10 SUPPOSITORY | Refills: 0 | OUTPATIENT
Start: 2018-02-10 | End: 2018-02-10 | Stop reason: HOSPADM

## 2018-02-10 RX ORDER — PROMETHAZINE HYDROCHLORIDE 12.5 MG/1
12.5 TABLET ORAL
Status: DISCONTINUED | OUTPATIENT
Start: 2018-02-10 | End: 2018-02-10 | Stop reason: HOSPADM

## 2018-02-10 RX ORDER — HYOSCYAMINE SULFATE 0.12 MG/1
0.12 TABLET SUBLINGUAL
Qty: 90 TABLET | Refills: 0 | Status: SHIPPED | OUTPATIENT
Start: 2018-02-10 | End: 2018-06-11

## 2018-02-10 RX ORDER — AMITRIPTYLINE HYDROCHLORIDE 25 MG/1
25 TABLET, FILM COATED ORAL NIGHTLY
Qty: 30 TABLET | Refills: 2 | Status: SHIPPED | OUTPATIENT
Start: 2018-02-10 | End: 2018-03-16 | Stop reason: SDUPTHER

## 2018-02-10 RX ORDER — PROMETHAZINE HYDROCHLORIDE 12.5 MG/1
12.5 TABLET ORAL
Qty: 90 TABLET | Refills: 0 | Status: SHIPPED | OUTPATIENT
Start: 2018-02-11 | End: 2018-02-14

## 2018-02-10 RX ORDER — HYOSCYAMINE SULFATE 0.12 MG/1
0.12 TABLET SUBLINGUAL
Status: DISCONTINUED | OUTPATIENT
Start: 2018-02-10 | End: 2018-02-10 | Stop reason: HOSPADM

## 2018-02-10 RX ADMIN — RAMELTEON 8 MG: 8 TABLET, FILM COATED ORAL at 12:02

## 2018-02-10 RX ADMIN — HYOSCYAMINE SULFATE 0.12 MG: 0.12 TABLET ORAL; SUBLINGUAL at 11:02

## 2018-02-10 RX ADMIN — Medication 1 CAPSULE: at 08:02

## 2018-02-10 RX ADMIN — ONDANSETRON 4 MG: 2 INJECTION INTRAMUSCULAR; INTRAVENOUS at 06:02

## 2018-02-10 RX ADMIN — SODIUM CHLORIDE: 0.9 INJECTION, SOLUTION INTRAVENOUS at 12:02

## 2018-02-10 RX ADMIN — PROMETHAZINE HYDROCHLORIDE 12.5 MG: 12.5 TABLET ORAL at 04:02

## 2018-02-10 RX ADMIN — PANTOPRAZOLE SODIUM 40 MG: 40 TABLET, DELAYED RELEASE ORAL at 08:02

## 2018-02-10 RX ADMIN — SODIUM CHLORIDE: 0.9 INJECTION, SOLUTION INTRAVENOUS at 08:02

## 2018-02-10 RX ADMIN — PROMETHAZINE HYDROCHLORIDE 12.5 MG: 12.5 TABLET ORAL at 11:02

## 2018-02-10 RX ADMIN — HYOSCYAMINE SULFATE 0.12 MG: 0.12 TABLET ORAL; SUBLINGUAL at 04:02

## 2018-02-10 RX ADMIN — ONDANSETRON 4 MG: 2 INJECTION INTRAMUSCULAR; INTRAVENOUS at 02:02

## 2018-02-10 NOTE — PLAN OF CARE
Problem: Patient Care Overview  Goal: Plan of Care Review  Outcome: Ongoing (interventions implemented as appropriate)  Pt afebrile, no s/s infection. Pt reports pain level 5/10 post pain med. Pt cont on ivf at 125cc/hr. Pt with nause/vomit at end of shift. Medicated with antiemetics.

## 2018-02-10 NOTE — PROGRESS NOTES
"[facility] Hospital Medicine  Progress Note    Patient Name: Arian Farris  MRN: [id number]  Patient Class: OP- Observation   Admission Date: 2/7/2018  Length of Stay: 0 days  Attending Physician: Radha Phoenix MD  Primary Care Provider: Moises Tilley MD    [facility] Medicine Team: [facility] MED  Marianne Pereira PA-C    Subjective:     Principal Problem:Abdominal pain    HPI:  Mr. Farris is a 18yo  male with history of migraines, abdominal migraines, s/p laparoscopic cholecystectomy (1/3/18) presenting with acute on chronic, diffuse abdominal pain and vomiting x 2 days. 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 He has symptoms occasionally, but for the last month he has had increasing N/V/cramping. He was admitted to [facility] 02/04 to 2/6/18 with similar complaints. He states and extensive work up was performed, but does not remember specifics. He states "nothing was found except elevated liver enzymes", he improved with zofran, IV pain medications. He tolerated a diet and was discharged home with no prescriptions. A day later he had significant emesis after eating eggs. His abdominal pain is mostly epigastric. His emesis is bilious, no blood. He denies changes to his stools, has daily BM, no constipation or diarrhea. He denies any precipitating factors or triggers. He reports history of "excessive" alcohol use, 10-12 drinks/night while starting college, reports decreasing use over Summer 2017, last alcohol consumption was Super Bowl Sunday where he had 2 beers.  He reports continued bladder and abdominal spams/pain, nausea and vomiting; denies fever, CP, SOB. 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    Hospital Course:  Pt admitted to observation for acute on chronic abdominal pain; recently discharged from [facility] (CM to obtain records). GI consulted and rec IgA and TTG to r/o Celiac, serum " tryptase, 24 hour urine for metanephrines, VMA, 5-HIAA, spot urine for total PBG, serum heavy metal screen, acute hep panel (negative), AM cortisol (4.2), TSH (WNL) and T4 (WNL). MRCP shows no significant intrahepatic biliary ductal dilatation, no focal filling defects identified within the common bile duct which appears normal in caliber. Plan to be discharged after 24 hour urine collection completed 2/9 but pt developed episodes of N/V with abdominal pain s/p smoothie. Continue supportive care, antiemetics (IV, PO, rectal), and analgesics PRN. Pt to follow up with Dr. Breaux as an outpatient.     Interval History: No acute events overnight. Tolerated smoothie yesterday evening. Plan to be discharged after 24 hour urine collection completed 2/9 but pt developed episodes of N/V with abdominal pain s/p smoothie this afternoon. Continue supportive care, antiemetics (IV, PO, rectal), and analgesics PRN. Discussed plan of care with patient and mother.    Review of Systems   Constitutional: Negative for chills, diaphoresis, fatigue, fever and unexpected weight change.   Respiratory: Negative for cough, shortness of breath and wheezing.    Cardiovascular: Negative for chest pain and palpitations.   Gastrointestinal: Positive for abdominal pain, nausea and vomiting. Negative for abdominal distention, blood in stool, constipation and diarrhea.   Neurological: Positive for weakness. Negative for dizziness, light-headedness and headaches.   Psychiatric/Behavioral: Negative for agitation and behavioral problems.     Objective:     Vital Signs (Most Recent):  Temp: 98.5 °F (36.9 °C) (pt c/o feeling like had fever) (02/09/18 1834)  Pulse: 83 (02/09/18 1559)  Resp: 18 (02/09/18 1559)  BP: 136/82 (02/09/18 1559)  SpO2: 99 % (02/09/18 1559) Vital Signs (24h Range):  Temp:  [96.1 °F (35.6 °C)-98.5 °F (36.9 °C)] 98.5 °F (36.9 °C)  Pulse:  [63-84] 83  Resp:  [16-18] 18  SpO2:  [94 %-99 %] 99 %  BP: (119-138)/(61-82) 136/82     Weight:  63.5 kg (140 lb 1 oz)  Body mass index is 19.53 kg/m².    Intake/Output Summary (Last 24 hours) at 02/09/18 1915  Last data filed at 02/09/18 1701   Gross per 24 hour   Intake             2940 ml   Output             3300 ml   Net             -360 ml      Physical Exam   Constitutional: He is oriented to person, place, and time. He appears well-developed and well-nourished. No distress.   Cardiovascular: Normal rate, regular rhythm, normal heart sounds and intact distal pulses.  Exam reveals no gallop and no friction rub.    No murmur heard.  Pulmonary/Chest: Effort normal and breath sounds normal. He has no wheezes. He has no rales.   Abdominal: Soft. Bowel sounds are normal. He exhibits no distension. There is no rebound and no guarding.   Mild diffuse TTP   Musculoskeletal: Normal range of motion. He exhibits no edema or tenderness.   Neurological: He is alert and oriented to person, place, and time. No cranial nerve deficit.   Skin: Skin is warm and dry. He is not diaphoretic.   Psychiatric: He has a normal mood and affect. His behavior is normal.   Nursing note and vitals reviewed.      Significant Labs: All pertinent labs within the past 24 hours have been reviewed.    Significant Imaging: I have reviewed all pertinent imaging results/findings within the past 24 hours.    Assessment/Plan:      * Abdominal pain    18 yo male with pertinent history of abdominal migraines, s/p lap michelle (1/3/18) presenting with chronic on acute diffuse abdominal pain and vomiting x 2 days.  He was discharged from OSH (2/6/18) after presenting with same symptoms on Sunday 2/4/18, eventually tolerating PO    -Acute on chronic, no clear etiology after extensive work up per patient  - Supportive care with hyoscyamine, PPI, probiotic  - GI consulted with appreciation, keep NPO  2/8: GI consulted and rec IgA (36) and TTG to r/o Celiac, serum tryptase, 24 hour urine for metanephrines, VMA, 5-HIAA, spot urine for total PBG, serum heavy  metal screen, acute hep panel, AM cortisol, TSH (WNL) and T4 (WNL). NPO at [facility] for MRCP tomorrow.   2/9: MRCP shows no significant intrahepatic biliary ductal dilatation, no focal filling defects identified within the common bile duct which appears normal in caliber. Pt to be discharged after 24 hour urine collection completed per GI recs. Further episodes of N/V with abdominal pain this afternoon s/p smoothie. Continue supportive care, antiemetics (IV, PO, rectal), and analgesics PRN.   - Pt to follow up with Dr. Breaux as an outpatient.           VTE Risk Mitigation         Ordered     Low Risk of VTE  Once      02/07/18 2319     Place BURTON hose  Until discontinued      02/07/18 2004              Marianne Pereira PA-C  Department of Hospital Medicine   [facility]  Discussed with staff: Dr. Phoenix

## 2018-02-10 NOTE — TREATMENT PLAN
GI Follow-up Note    Pt seen / examined this AM.  Had episode of emesis and abd pain last night, lasted approx 5 hours.  Eventually resolved with iv phenergan    Vitals:    02/10/18 1222   BP: (!) 105/56   Pulse: 61   Resp: 16   Temp: 97.9 °F (36.6 °C)       Gen: NAD, appears ill but nontoxic  Abd: soft , NT ND    Chart reviewed.    A:  At this point, suspect likely cyclic vomiting syndrome.  Awaiting other workup.    Plan:  Add elavil 25 mg qhs     - check QTc prior to administration  Levsin sublingual PRN before meals  Rectal phenergan as needed    AVOID chocolate, MSG, and cheese, as these exacerbate CVS    Discussed with Dr. Breaux  Please call with any additional concerns /questions    Mika Nascimento MD  Gastroenterology Fellow (PGY-V)  Pager: [phone]

## 2018-02-10 NOTE — SUBJECTIVE & OBJECTIVE
Interval History: No acute events overnight. N/V and abdominal pain resolved ~11PM s/p IV phenergan. This AM, pt resting comfortably in bed; denies N/V and abdominal pain. Discussed plan with patient and parents as well as GI.    Review of Systems   Constitutional: Negative for chills, diaphoresis, fatigue, fever and unexpected weight change.   Respiratory: Negative for cough, shortness of breath and wheezing.    Cardiovascular: Negative for chest pain and palpitations.   Gastrointestinal: Positive for abdominal pain, nausea and vomiting. Negative for abdominal distention, blood in stool, constipation and diarrhea.   Neurological: Positive for weakness. Negative for dizziness, light-headedness and headaches.   Psychiatric/Behavioral: Negative for agitation and behavioral problems.     Objective:     Vital Signs (Most Recent):  Temp: 97.9 °F (36.6 °C) (02/10/18 1222)  Pulse: 61 (02/10/18 1222)  Resp: 16 (02/10/18 1222)  BP: (!) 105/56 (02/10/18 1222)  SpO2: 97 % (02/10/18 1222) Vital Signs (24h Range):  Temp:  [97.9 °F (36.6 °C)-98.5 °F (36.9 °C)] 97.9 °F (36.6 °C)  Pulse:  [57-83] 61  Resp:  [16-18] 16  SpO2:  [96 %-100 %] 97 %  BP: (105-141)/() 105/56     Weight: 63.5 kg (140 lb 1 oz)  Body mass index is 19.53 kg/m².    Intake/Output Summary (Last 24 hours) at 02/10/18 1434  Last data filed at 02/10/18 0600   Gross per 24 hour   Intake             1500 ml   Output             2650 ml   Net            -1150 ml      Physical Exam   Constitutional: He is oriented to person, place, and time. He appears well-developed and well-nourished. No distress.   Cardiovascular: Normal rate, regular rhythm, normal heart sounds and intact distal pulses.  Exam reveals no gallop and no friction rub.    No murmur heard.  Pulmonary/Chest: Effort normal and breath sounds normal. He has no wheezes. He has no rales.   Abdominal: Soft. Bowel sounds are normal. He exhibits no distension. There is no rebound and no guarding.    Musculoskeletal: Normal range of motion. He exhibits no edema or tenderness.   Neurological: He is alert and oriented to person, place, and time. No cranial nerve deficit.   Skin: Skin is warm and dry. He is not diaphoretic.   Psychiatric: He has a normal mood and affect. His behavior is normal.   Nursing note and vitals reviewed.      Significant Labs: All pertinent labs within the past 24 hours have been reviewed.    Significant Imaging: I have reviewed all pertinent imaging results/findings within the past 24 hours.

## 2018-02-10 NOTE — SUBJECTIVE & OBJECTIVE
Interval History: No acute events overnight. Tolerated smoothie yesterday evening. Plan to be discharged after 24 hour urine collection completed 2/9 but pt developed episodes of N/V with abdominal pain s/p smoothie this afternoon. Continue supportive care, antiemetics (IV, PO, rectal), and analgesics PRN. Discussed plan of care with patient and mother.    Review of Systems   Constitutional: Negative for chills, diaphoresis, fatigue, fever and unexpected weight change.   Respiratory: Negative for cough, shortness of breath and wheezing.    Cardiovascular: Negative for chest pain and palpitations.   Gastrointestinal: Positive for abdominal pain, nausea and vomiting. Negative for abdominal distention, blood in stool, constipation and diarrhea.   Neurological: Positive for weakness. Negative for dizziness, light-headedness and headaches.   Psychiatric/Behavioral: Negative for agitation and behavioral problems.     Objective:     Vital Signs (Most Recent):  Temp: 98.5 °F (36.9 °C) (pt c/o feeling like had fever) (02/09/18 1834)  Pulse: 83 (02/09/18 1559)  Resp: 18 (02/09/18 1559)  BP: 136/82 (02/09/18 1559)  SpO2: 99 % (02/09/18 1559) Vital Signs (24h Range):  Temp:  [96.1 °F (35.6 °C)-98.5 °F (36.9 °C)] 98.5 °F (36.9 °C)  Pulse:  [63-84] 83  Resp:  [16-18] 18  SpO2:  [94 %-99 %] 99 %  BP: (119-138)/(61-82) 136/82     Weight: 63.5 kg (140 lb 1 oz)  Body mass index is 19.53 kg/m².    Intake/Output Summary (Last 24 hours) at 02/09/18 1915  Last data filed at 02/09/18 1701   Gross per 24 hour   Intake             2940 ml   Output             3300 ml   Net             -360 ml      Physical Exam   Constitutional: He is oriented to person, place, and time. He appears well-developed and well-nourished. No distress.   Cardiovascular: Normal rate, regular rhythm, normal heart sounds and intact distal pulses.  Exam reveals no gallop and no friction rub.    No murmur heard.  Pulmonary/Chest: Effort normal and breath sounds normal.  He has no wheezes. He has no rales.   Abdominal: Soft. Bowel sounds are normal. He exhibits no distension. There is no rebound and no guarding.   Mild diffuse TTP   Musculoskeletal: Normal range of motion. He exhibits no edema or tenderness.   Neurological: He is alert and oriented to person, place, and time. No cranial nerve deficit.   Skin: Skin is warm and dry. He is not diaphoretic.   Psychiatric: He has a normal mood and affect. His behavior is normal.   Nursing note and vitals reviewed.      Significant Labs: All pertinent labs within the past 24 hours have been reviewed.    Significant Imaging: I have reviewed all pertinent imaging results/findings within the past 24 hours.

## 2018-02-10 NOTE — ASSESSMENT & PLAN NOTE
18 yo male with pertinent history of abdominal migraines, s/p lap michelle (1/3/18) presenting with chronic on acute diffuse abdominal pain and vomiting x 2 days. He was discharged from OSH (2/6/18) after presenting with same symptoms on Sunday 2/4/18, eventually tolerating PO    -Acute on chronic, no clear etiology after extensive work up per patient  - Supportive care with hyoscyamine, PPI, probiotic  - GI consulted with appreciation  2/8: GI consulted and rec IgA (36) and TTG to r/o Celiac, serum tryptase, 24 hour urine for metanephrines, VMA, 5-HIAA, spot urine for total PBG, serum heavy metal screen, acute hep panel, AM cortisol (4.2), TSH (WNL) and T4 (WNL). NPO at [facility] for MRCP tomorrow.   2/9: MRCP shows no significant intrahepatic biliary ductal dilatation, no focal filling defects identified within the common bile duct which appears normal in caliber. Pt to be discharged after 24 hour urine collection completed per GI recs. Further episodes of N/V with abdominal pain this afternoon s/p smoothie.   2/10: GI suspecting cyclic vomiting syndrome; rec adding elavil 25 mg qhs, Levsin sublingual and phenergan before meals, rectal phenergan PRN; AVOID chocolate, MSG, and cheese. Pt discharged home after tolerating diet. Placed amb ref to endocrine per GI recs. Pt to follow up with Dr. Breaux as an outpatient.

## 2018-02-10 NOTE — NURSING
As requested by GI Dr Phoenix was notified that pt still having pain post toradol admin and that pt cont with nausea vomit post iv zofran. Percocet prn and prn phenergan ordered per Dr Phoenix.

## 2018-02-10 NOTE — PLAN OF CARE
Problem: Patient Care Overview  Goal: Plan of Care Review  Outcome: Ongoing (interventions implemented as appropriate)  Pt. Afebrile,abd. Soft with + bowel sounds,WBC count 5.7,no evidence of infection. Pt. continues to experience pain to abdomen,unable to tolerate pain med  earlier  Due to nausea and continued vomiting. Pt. Finally got relied from vomiting after receiving IV phenergan. Pain  subsided at that time.IVF continued as ordered. Remains on fall precautions,bed low and locked,side rails up mother at bedside. Continue plan of care.

## 2018-02-11 NOTE — PLAN OF CARE
Problem: Patient Care Overview  Goal: Plan of Care Review  Outcome: Ongoing (interventions implemented as appropriate)  Pt afebrile, no s/s infection. Pt denies pain this shift. Pt denies nausea this shift. Pt started on new pre meal med regimen for nausea. Pt hydrated with ivf and po fluids. Pt with no falls or injuries this shift.

## 2018-02-11 NOTE — NURSING
Order to d/c home today. Saline lock d/c. VSS. Discharge instructions given to pt and mother. Pt and mother verbalize understanding. Pt discharged from OBS unit ambulating. Pt accompanied by mother. All personal belongings taken home by pt.

## 2018-02-11 NOTE — PLAN OF CARE
02/10/2018      Arian Farris  [address] [address]          Hospital Medicine Dept.  [facility] [address] [place] [address]  [phone] after hours  [phone] fax Arian Farris has been hospitalized at the [facility] since 2/7/2018-2/10/2018. Please excuse the patient from duties.     Please contact me if you have any questions.                    Marianne Pereira PA-C  02/10/2018

## 2018-02-11 NOTE — DISCHARGE SUMMARY
"[facility] Hospital Medicine  Discharge Summary      Patient Name: Arian Farris  MRN: [id number]  Admission Date: 2/7/2018  Hospital Length of Stay: 0 days  Discharge Date and Time: 2/10/2018  7:02 PM  Attending Physician: Kiara att. providers found   Discharging Provider: Marianne Pereira PA-C  Primary Care Provider: Moises Tilley MD  [facility] Medicine Team: [facility] MED F Marianne Pereira PA-C    HPI:   Mr. Farris is a 18yo  male with history of migraines, abdominal migraines, s/p laparoscopic cholecystectomy (1/3/18) presenting with acute on chronic, diffuse abdominal pain and vomiting x 2 days. 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 He has symptoms occasionally, but for the last month he has had increasing N/V/cramping. He was admitted to [facility] 02/04 to 2/6/18 with similar complaints. He states and extensive work up was performed, but does not remember specifics. He states "nothing was found except elevated liver enzymes", he improved with zofran, IV pain medications. He tolerated a diet and was discharged home with no prescriptions. A day later he had significant emesis after eating eggs. His abdominal pain is mostly epigastric. His emesis is bilious, no blood. He denies changes to his stools, has daily BM, no constipation or diarrhea. He denies any precipitating factors or triggers. He reports history of "excessive" alcohol use, 10-12 drinks/night while starting college, reports decreasing use over Summer 2017, last alcohol consumption was Super Bowl Sunday where he had 2 beers.  He reports continued bladder and abdominal spams/pain, nausea and vomiting; denies fever, CP, SOB. 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    * No surgery found *      Hospital Course:   Pt admitted to observation for acute on chronic abdominal pain; recently discharged from [facility] for same sx. GI consulted and rec " IgA and TTG to r/o Celiac, serum tryptase, 24 hour urine for metanephrines, VMA, 5-HIAA, spot urine for total PBG, serum heavy metal screen, acute hep panel (negative), AM cortisol (4.2), TSH (WNL) and T4 (WNL). MRCP shows no significant intrahepatic biliary ductal dilatation, no focal filling defects identified within the common bile duct which appears normal in caliber. GI suspecting cyclic vomiting syndrome; rec adding elavil 25 mg qhs, Levsin sublingual and phenergan before meals, rectal phenergan PRN; AVOID chocolate, MSG, and cheese. Pt discharged home after tolerating diet. Placed amb ref to endocrine per GI recs. Pt to follow up with Dr. Breaux as an outpatient.      Consults:   Consults         Status Ordering Provider     Inpatient consult to Gastroenterology  Once     Provider:  (Not yet assigned)    Completed THERESE CORTÉS          * Abdominal pain    18 yo male with pertinent history of abdominal migraines, s/p lap michelle (1/3/18) presenting with chronic on acute diffuse abdominal pain and vomiting x 2 days. He was discharged from OSH (2/6/18) after presenting with same symptoms on Sunday 2/4/18, eventually tolerating PO    -Acute on chronic, no clear etiology after extensive work up per patient  - Supportive care with hyoscyamine, PPI, probiotic  - GI consulted with appreciation  2/8: GI consulted and rec IgA (36) and TTG to r/o Celiac, serum tryptase, 24 hour urine for metanephrines, VMA, 5-HIAA, spot urine for total PBG, serum heavy metal screen, acute hep panel, AM cortisol (4.2), TSH (WNL) and T4 (WNL). NPO at [facility] for MRCP tomorrow.   2/9: MRCP shows no significant intrahepatic biliary ductal dilatation, no focal filling defects identified within the common bile duct which appears normal in caliber. Pt to be discharged after 24 hour urine collection completed per GI recs. Further episodes of N/V with abdominal pain this afternoon s/p smoothie.   2/10: GI suspecting cyclic vomiting syndrome; rec adding  elavil 25 mg qhs, Levsin sublingual and phenergan before meals, rectal phenergan PRN; AVOID chocolate, MSG, and cheese. Pt discharged home after tolerating diet. Placed amb ref to endocrine per GI recs. Pt to follow up with Dr. Breaux as an outpatient.           Final Active Diagnoses:    Diagnosis Date Noted POA    PRINCIPAL PROBLEM:  Abdominal pain [R10.9] 12/22/2017 Yes      Problems Resolved During this Admission:    Diagnosis Date Noted Date Resolved POA       Discharged Condition: stable    Disposition: Home or Self Care    Follow Up:  Follow-up Information     Paul Breaux MD.    Specialty:  Gastroenterology  Why:  Dr. Breaux's nurse to call the patient with appointment date & time.  Contact information:  [address] [address] [phone]             SUSAN Jo MD On 2/14/2018.    Specialty:  Family Medicine  Why:  at 1:00pm  Contact information:  [address] [place] [address] [phone]                 Patient Instructions:     Ambulatory referral to Endocrinology   Referral Priority: Routine Referral Type: Consultation   Requested Specialty: Endocrinology    Number of Visits Requested: 1      Activity as tolerated     Notify your health care provider if you experience any of the following:  temperature >100.4     Notify your health care provider if you experience any of the following:  persistent nausea and vomiting or diarrhea     Notify your health care provider if you experience any of the following:  severe uncontrolled pain     Notify your health care provider if you experience any of the following:  redness, tenderness, or signs of infection (pain, swelling, redness, odor or green/yellow discharge around incision site)         Significant Diagnostic Studies: Radiology: MRCP  Cardiac Graphics: ECG    Pending Diagnostic Studies:     Procedure Component Value Units Date/Time    C1 esterase inhibitor, functional [[id number]] Collected:  02/09/18 0632    Order Status:  Sent  Lab Status:  In process Updated:  02/09/18 0809    Specimen:  Blood from Blood     Celiac Disease Panel [[id number]] Collected:  02/09/18 0632    Order Status:  Sent Lab Status:  In process Updated:  02/09/18 0809    Specimen:  Blood from Blood     Porphyrins, Total, Plasma w/ Reflex Frac [[id number]] Collected:  02/08/18 1301    Order Status:  Sent Lab Status:  In process Updated:  02/08/18 1311    Specimen:  Blood from Blood     Tissue transglutaminase, IgA [[id number]] Collected:  02/08/18 1301    Order Status:  Sent Lab Status:  In process Updated:  02/08/18 1311    Specimen:  Blood from Blood          Medications:  Reconciled Home Medications:   Discharge Medication List as of 2/10/2018  6:41 PM      START taking these medications    Details   amitriptyline (ELAVIL) 25 MG tablet Take 1 tablet (25 mg total) by mouth every evening., Starting Sat 2/10/2018, Until Sun 2/10/2019, Normal      diphenhydrAMINE (BENADRYL) 25 mg capsule Take 1 each (25 mg total) by mouth every 6 (six) hours as needed for Itching or Allergies., Starting Wed 2/7/2018, Print      hyoscyamine (LEVSIN/SL) 0.125 mg Subl Place 1 tablet (0.125 mg total) under the tongue 3 (three) times daily before meals., Starting Sat 2/10/2018, Until Mon 3/12/2018, Normal      promethazine (PHENERGAN) 12.5 MG Supp Place 1 suppository (12.5 mg total) rectally every 6 (six) hours as needed (nausea)., Starting Sat 2/10/2018, Normal      promethazine (PHENERGAN) 12.5 MG Tab Take 1 tablet (12.5 mg total) by mouth 3 (three) times daily before meals., Starting Sun 2/11/2018, Until Tue 3/13/2018, Normal         STOP taking these medications       prochlorperazine (COMPAZINE) 25 MG suppository Comments:   Reason for Stopping:               Indwelling Lines/Drains at time of discharge:   Lines/Drains/Airways          No matching active lines, drains, or airways          Time spent on the discharge of patient: 30 minutes  Patient was seen and examined on the date of  discharge and determined to be suitable for discharge.         Marianne Pereira PA-C  Department of Hospital Medicine  [facility]  Discussed with staff: Dr. Phoenix

## 2018-02-12 LAB
5HIAA & CREATININE UR-IMP: NORMAL
5OH-INDOLEACETATE 24H UR-MCNC: 1.3 MG/L
5OH-INDOLEACETATE 24H UR-MRATE: 6 MG/D (ref 0–15)
5OH-INDOLEACETATE/CREAT 24H UR: 3 MG/GCR (ref 0–14)
CLINICAL BIOCHEMIST REVIEW: NORMAL
COLLECT DURATION TIME SPEC: 24 HR
COLLECT DURATION TIME SPEC: NORMAL HR
CREAT 24H UR-MRATE: 2040 MG/D (ref 1000–2500)
CREAT 24H UR-MRATE: NORMAL MG/D (ref 1000–2500)
CREAT UR-MCNC: 41 MG/DL
CREAT UR-MCNC: 63 MG/DL
D-ALA 24H UR-SRATE: NORMAL UMOL/D (ref 0–60)
D-ALA UR-SCNC: 6 UMOL/L (ref 0–35)
GLIADIN PEPTIDE IGA SER-ACNC: 3 UNITS
GLIADIN PEPTIDE IGG SER-ACNC: 3 UNITS
IGA SERPL-MCNC: 34 MG/DL
PORPHYRINS REVIEWED BY: NORMAL
PORPHYRINS SERPL-MCNC: <1 MCG/DL
SPECIMEN VOL ?TM UR: 4975 ML
SPECIMEN VOL ?TM UR: NORMAL ML
TTG IGA SER IA-ACNC: 2 UNITS
TTG IGA SER IA-ACNC: 2 UNITS
TTG IGG SER IA-ACNC: 3 UNITS

## 2018-02-12 NOTE — PLAN OF CARE
02/12/18 0617   Final Note   Assessment Type Final Discharge Note     Patient discharged home with no needs 2/10/18.

## 2018-02-13 LAB
COLLECT DURATION TIME SPEC: 24 HR
COLLECT DURATION TIME SPEC: 24 HR
CREAT 24H UR-MRATE: 1890 MG/D (ref 1000–2500)
CREAT 24H UR-MRATE: 1890 MG/D (ref 1000–2500)
CREAT UR-MCNC: 38 MG/DL
CREAT UR-MCNC: 38 MG/DL
METANEPH 24H UR-MCNC: 37 UG/L
METANEPH 24H UR-MRATE: 184 UG/D (ref 62–207)
METANEPH+NORMETANEPH UR-IMP: NORMAL
METANEPH/CREAT 24H UR: 97 UG/G CRT (ref 0–300)
NORMETANEPHRINE 24H UR-MCNC: 55 UG/L
NORMETANEPHRINE 24H UR-MRATE: 274 UG/D (ref 95–379)
NORMETANEPHRINE/CREAT 24H UR: 145 UG/G CRT (ref 0–400)
SPECIMEN VOL ?TM UR: 4975 ML
SPECIMEN VOL ?TM UR: 4975 ML
VMA & CREAT 24H UR-IMP: NORMAL
VMA 24H UR-MCNC: 0.9 MG/L
VMA 24H UR-MRATE: 4.5 MG/D (ref 0–7)
VMA/CREAT 24H UR: 2 MG/GCR (ref 0–6)

## 2018-02-14 ENCOUNTER — OFFICE VISIT (OUTPATIENT)
Dept: INTERNAL MEDICINE | Facility: CLINIC | Age: 18
End: 2018-02-14
Payer: COMMERCIAL

## 2018-02-14 VITALS
DIASTOLIC BLOOD PRESSURE: 86 MMHG | HEART RATE: 117 BPM | TEMPERATURE: 97 F | BODY MASS INDEX: 18.95 KG/M2 | OXYGEN SATURATION: 97 % | SYSTOLIC BLOOD PRESSURE: 116 MMHG | HEIGHT: 71 IN | WEIGHT: 135.38 LBS

## 2018-02-14 DIAGNOSIS — R10.13 EPIGASTRIC PAIN: Primary | ICD-10-CM

## 2018-02-14 LAB — C1INH FUNCTIONAL/C1INH TOTAL MFR SERPL: >86 %

## 2018-02-14 PROCEDURE — 99213 OFFICE O/P EST LOW 20 MIN: CPT | Mod: S$GLB,,, | Performed by: FAMILY MEDICINE

## 2018-02-14 PROCEDURE — 99999 PR PBB SHADOW E&M-EST. PATIENT-LVL III: CPT | Mod: PBBFAC,,, | Performed by: FAMILY MEDICINE

## 2018-02-14 NOTE — PROGRESS NOTES
"CHIEF COMPLAINT  Hospital Follow Up (in [facility] for a week); Nausea; and Emesis      HISTORY OF PRESENT ILLNESS  Problem List Items Addressed This Visit     Abdominal pain - Primary    Overview     "Abdominal Migraine"         Current Assessment & Plan      He was recently discharged from hospital for apparent acute exacerbation of chronic recurrent abdominal migraines. Hospital records reviewed. He is not suffering from an abdominal migraine at present. He has follow-up appointment scheduled with gastroenterology. We discussed treatment options. It was agreed that he would follow up with gastroenterology as instructed and he will return to see me if he is unsatisfied with the treatments he receives, and we will discuss his options for obtaining 2nd opinion.               REVIEW OF SYSTEMS  CONSTITUTIONAL: No fever or chills reported.   GENITOURINARY: No dysuria or hematuria reported.   GASTROINTESTINAL: No hematemesis or melena reported.     PHYSICAL EXAM  Vitals:    02/14/18 1327   BP: 116/86   BP Location: Right arm   Patient Position: Sitting   BP Method: Medium (Manual)   Pulse: (!) 117   Temp: 97.1 °F (36.2 °C)   TempSrc: Tympanic   SpO2: 97%   Weight: 61.4 kg (135 lb 5.8 oz)   Height: 5' 11" (1.803 m)     CONSTITUTIONAL: Vital signs noted. No apparent distress. Does not appear acutely ill or septic. Appears adequately hydrated.  EYE: Sclerae anicteric. Lids and conjunctiva unremarkable.  ENT: External ENT unremarkable. Oropharynx moist.  NECK: Trachea midline. Thyroid nontender.  PULM: Lungs clear. Breathing unlabored.  CV: Auscultation reveals regular rate and rhythm without murmur, gallop or rub. No carotid bruit.  GI: Soft and nontender. Bowel sounds normal.  DERM: Skin warm and moist with normal turgor.  NEURO: There are no gross focal motor deficits or gross deficits of cranial nerves III-XII.  PSYCH: Alert and oriented x 3. Mood is grossly neutral. Affect appropriate. Judgment and insight not " "grossly compromised.  MSK: Grossly normal stance and gait.     PAST MEDICAL HISTORY, FAMILY HISTORY, SOCIAL HISTORY, CURRENT MEDICATION LIST, and ALLERGY LIST reviewed by me (THIERRY Jo MD) and are updated consistent with the patient's report.    ASSESSMENT and PLAN  Epigastric pain        Follow-up if symptoms worsen or fail to improve, for any new complaints or concerns.    ABOUT THIS DOCUMENTATION:  · The order of the conditions listed in the HPI is one of convenience and does not necessarily reflect the chronology of the appointment, nor the relative importance of a condition. It is possible that additional description or status details about condition(s) may be found elsewhere in the EHR documentation for today's encounter.  · Documentation entered by me for this encounter was done in part using speech-recognition technology. Although I have made an effort to ensure accuracy, "sound like" errors may exist and should be interpreted in context.                        -THIERRY Jo MD    There are no Patient Instructions on file for this visit.      "

## 2018-02-15 ENCOUNTER — OFFICE VISIT (OUTPATIENT)
Dept: SURGERY | Facility: CLINIC | Age: 18
End: 2018-02-15
Payer: COMMERCIAL

## 2018-02-15 VITALS
SYSTOLIC BLOOD PRESSURE: 138 MMHG | HEIGHT: 71 IN | TEMPERATURE: 99 F | HEART RATE: 124 BPM | BODY MASS INDEX: 18.62 KG/M2 | WEIGHT: 133 LBS | DIASTOLIC BLOOD PRESSURE: 73 MMHG

## 2018-02-15 DIAGNOSIS — R79.89 ELEVATED LFTS: ICD-10-CM

## 2018-02-15 DIAGNOSIS — Z09 POSTOP CHECK: Primary | ICD-10-CM

## 2018-02-15 DIAGNOSIS — R11.2 INTRACTABLE VOMITING WITH NAUSEA, UNSPECIFIED VOMITING TYPE: ICD-10-CM

## 2018-02-15 PROBLEM — R10.11 RIGHT UPPER QUADRANT PAIN: Status: RESOLVED | Noted: 2017-12-19 | Resolved: 2018-02-15

## 2018-02-15 PROBLEM — K80.20 GALLSTONES: Status: RESOLVED | Noted: 2018-01-03 | Resolved: 2018-02-15

## 2018-02-15 PROCEDURE — 99024 POSTOP FOLLOW-UP VISIT: CPT | Mod: S$GLB,,, | Performed by: SURGERY

## 2018-02-15 PROCEDURE — 99999 PR PBB SHADOW E&M-EST. PATIENT-LVL III: CPT | Mod: PBBFAC,,, | Performed by: SURGERY

## 2018-02-15 RX ORDER — BUTYROSPERMUM PARKII(SHEA BUTTER), SIMMONDSIA CHINENSIS (JOJOBA) SEED OIL, ALOE BARBADENSIS LEAF EXTRACT .01; 1; 3.5 G/100G; G/100G; G/100G
250 LIQUID TOPICAL 2 TIMES DAILY
COMMUNITY
End: 2018-02-26

## 2018-02-15 RX ORDER — PROMETHAZINE HYDROCHLORIDE 12.5 MG/1
12.5 TABLET ORAL 3 TIMES DAILY PRN
COMMUNITY
End: 2019-07-26 | Stop reason: SDUPTHER

## 2018-02-15 RX ORDER — CALCIUM LACTATE 100 MG
TABLET ORAL
COMMUNITY
End: 2018-06-01

## 2018-02-15 NOTE — PROGRESS NOTES
"Arian Farris is a 17 y.o. male patient.   No diagnosis found.  Past Medical History:   Diagnosis Date    Abdominal migraine     Eczema     Migraines      No past surgical history pertinent negatives on file.  Scheduled Meds:  Continuous Infusions:  PRN Meds:    Review of patient's allergies indicates:  No Known Allergies  There are no hospital problems to display for this patient.    Blood pressure 138/73, pulse (!) 124, temperature 98.6 °F (37 °C), height 5' 11" (1.803 m), weight 60.3 kg (133 lb).    Subjective S/p lap michelle 1/3/18.  Since then he has been to the ER twice with his preop symptoms: diffuse abdominal pain, nausea and vomiting.  He did have elevated   Objective Abdomen benign, wounds clear.  Path reviewed.   Assessment & Plan Healing well but still has preop symptoms.  Will get mthfr (for possible leaky gut), lfts and ges.  Follow up with Dr. Breaux.       Elio Lam MD  2/15/2018  "

## 2018-02-15 NOTE — LETTER
[facility] - General Surgery  [address]  [place] [address]  Phone: [phone] February 15, 2018      THIERRY Jo MD  [facility] [address]    Patient: Arian Farris   MR Number: [id number]   YOB: 2000   Date of Visit: 2/15/2018     Dear Dr. Jo:    Thank you for referring Arian Farris to me for evaluation. Below are the relevant portions of my assessment and plan of care.    pepito barriose 1/3/18.  Since then he has been to the ER twice with his preop symptoms: diffuse abdominal pain, nausea and vomiting.  He did have elevated     PLAN: Healing well but still has pre-op symptoms.  Will get MTHFR (for possible leaky gut), LFTS and GES.  Follow up with Dr. Breaux.       If you have questions, please do not hesitate to call me. I look forward to following Arian along with you.    Sincerely,      Elio Lam MD   Section Head - General, Laparoscopic, Bariatric  Acute Care and Oncologic Surgery   - Surgical Weight Loss Program  [facility]    WSR/lizandro    CC  ADELA Miller MD Arnab Ray, MD

## 2018-02-16 ENCOUNTER — DOCUMENTATION ONLY (OUTPATIENT)
Dept: GENETICS | Facility: CLINIC | Age: 18
End: 2018-02-16

## 2018-02-16 NOTE — PROGRESS NOTES
Spoke to Arian's mom (who works in peds ). Arian has a history of multiple admissions for cyclic vomiting. He was recently admitted at [facility]. Testing for hereditary angioedema and hereditary intermittent porphyria is pending. Arian had his gallbladder removed. He will see endo.     Mom requested MTHFR testing and the surgeon ordered but the testing could not be sent out from [facility]. I briefly discuss the guidelines outlined by [employer] regarding the lack of clinical utility of MTHFR testing. Mom was considering paying $150 to have testing done from a holistic practicioner but she wanted to check if insurance would pay for the testing. I have provided the [facility] CPT code and mom was encouraged to call the insurance.     Arian is in school in [place]. I have booked next available with Dr Manrique. I have provided mom with the appt confirmation letter.

## 2018-02-19 ENCOUNTER — TELEPHONE (OUTPATIENT)
Dept: SURGERY | Facility: CLINIC | Age: 18
End: 2018-02-19

## 2018-02-20 LAB
COLLECT DURATION TIME SPEC: NORMAL HR
CREAT 24H UR-MRATE: NORMAL MG/D (ref 1000–2500)
CREAT UR-MCNC: 129 MG/DL
PBG 24H UR-SRATE: NORMAL UMOL/D (ref 0–11)
PBG UR-SCNC: <3 UMOL/L (ref 0–8.8)
SPECIMEN VOL ?TM UR: 1025 ML

## 2018-02-23 ENCOUNTER — HOSPITAL ENCOUNTER (OUTPATIENT)
Dept: RADIOLOGY | Facility: HOSPITAL | Age: 18
Discharge: HOME | End: 2018-02-23
Attending: SURGERY
Payer: COMMERCIAL

## 2018-02-23 DIAGNOSIS — R11.2 INTRACTABLE VOMITING WITH NAUSEA, UNSPECIFIED VOMITING TYPE: ICD-10-CM

## 2018-02-23 PROCEDURE — 78264 GASTRIC EMPTYING IMG STUDY: CPT | Mod: 26,,, | Performed by: RADIOLOGY

## 2018-02-23 PROCEDURE — A9541 TC99M SULFUR COLLOID: HCPCS

## 2018-02-23 PROCEDURE — 78264 GASTRIC EMPTYING IMG STUDY: CPT | Mod: TC

## 2018-02-26 ENCOUNTER — TELEPHONE (OUTPATIENT)
Dept: SURGERY | Facility: CLINIC | Age: 18
End: 2018-02-26

## 2018-02-26 ENCOUNTER — OFFICE VISIT (OUTPATIENT)
Dept: GASTROENTEROLOGY | Facility: CLINIC | Age: 18
End: 2018-02-26
Payer: COMMERCIAL

## 2018-02-26 VITALS
HEART RATE: 98 BPM | HEIGHT: 71 IN | BODY MASS INDEX: 19.5 KG/M2 | SYSTOLIC BLOOD PRESSURE: 149 MMHG | DIASTOLIC BLOOD PRESSURE: 91 MMHG | WEIGHT: 139.31 LBS

## 2018-02-26 DIAGNOSIS — R79.89 ELEVATED LFTS: ICD-10-CM

## 2018-02-26 DIAGNOSIS — R11.15 NON-INTRACTABLE CYCLICAL VOMITING WITH NAUSEA: Primary | ICD-10-CM

## 2018-02-26 PROCEDURE — 99999 PR PBB SHADOW E&M-EST. PATIENT-LVL III: CPT | Mod: PBBFAC,,, | Performed by: INTERNAL MEDICINE

## 2018-02-26 PROCEDURE — 3008F BODY MASS INDEX DOCD: CPT | Mod: S$GLB,,, | Performed by: INTERNAL MEDICINE

## 2018-02-26 PROCEDURE — 99214 OFFICE O/P EST MOD 30 MIN: CPT | Mod: S$GLB,,, | Performed by: INTERNAL MEDICINE

## 2018-02-26 NOTE — TELEPHONE ENCOUNTER
----- Message from Elio Lam MD sent at 2/23/2018  4:20 PM CST -----  Please let them know.  He has follow up with Dr. Breaux?

## 2018-02-26 NOTE — TELEPHONE ENCOUNTER
Notified pt of normal GES per Dr. Lam.    Pt to f/u with Dr. Breaux today @ 1100.  Pt aware of appt.

## 2018-02-26 NOTE — PATIENT INSTRUCTIONS
Cyclic vomiting syndrome    INFORMATION FOR PATIENTS -- Support and counseling may be helpful for selected patients. The Cyclic Vomiting Syndrome Association (CVSA), an international organization, was established in 1993 to provide support to patients with CVS.  For more information on the CVSA, contact:  Cyclic Vomiting Syndrome Association (CVSA-[place])  [address] [place] [address]  Phone: [phone]  Fax: [phone]  E-mail: [email or web address]  Website: www.cvsaonline.org  Additional resources that might be useful are listed in the table (table 4).  SUMMARY AND RECOMMENDATIONS  Cyclic vomiting syndrome (CVS) is an idiopathic disorder characterized by recurrent, stereotypical bouts of vomiting with intervening periods of normal health. Although it appears to affect primarily children, it is being recognized increasingly in adults. (See 'Introduction' above.)   The pathogenesis of CVS remains unknown, although it may represent a heterogeneous group of disorders. An association between CVS and migraine headaches has been most consistently described, suggesting that there may be a common pathophysiologic process. However, CVS has also been linked to food allergy, mitochondrial, metabolic, and endocrine disorders. (See 'Pathogenesis' above.)   Diagnostic criteria have been proposed based upon consensus of experts. The specific pattern of vomiting is variable among patients but, importantly, is stereotypical for an individual patient. Cyclic vomiting syndrome remains a diagnosis based upon the history and exclusion of alternative diagnoses. (See 'Diagnosis' above.)   Treatment can be considered as abortive, prophylactic, and supportive. Most treatments have been based upon observational data or clinical experience, forming the basis for the following suggested approach (Grade 2C).    If episodes occur more often than monthly, or if they are prolonged or debilitating, daily prophylactic  "therapy is recommended. If episodes are less frequent or are mild, abortive therapy at the onset of episodes is often preferred. If abortive therapy fails after two or three attempts, then switch to prophylactic therapy. (See "Management of migraine headache in children" and "Acute treatment of migraine in adults" and "Preventive treatment of migraine in adults".)    Some authorities advocate use of amitriptyline (0.5 mg/kg per day increased as needed to 1 mg/kg or higher per day at bedtime) for prophylaxis even if there is no history of headache or a family history of migraine. However, its use may be limited due to side effects in infants and toddlers under age 5 years. The EKG (particularly the QTc interval) should be monitored. In children under age 5 years, cyproheptadine is the first choice (or pizotifen outside of the [place]).    Coenzyme Q10 (10 mg/kg per day, or 200 mg twice daily) and L-carnitine (50 or 100 mg/kg per day, or one gram twice daily) have been advocated in prophylaxis, as data suggest that these dietary supplements may be highly effective, and side effects are infrequent and mild.    Topiramate is an unproven therapy that is gaining in popularity due to anecdotal experiences of efficacy. Abortive therapy with intravenous dextrose (10 percent) and high-dose ondansetron (0.3 to 0.4 mg/kg per dose, or 12 to 16 mg/dose) have been found to be helpful in anecdotal reports in children. Comfort measures during episodes include a dark quiet room, and sedation as appropriate. In addition, patients should be instructed to avoid fasting.    "

## 2018-02-26 NOTE — PROGRESS NOTES
[facility] Consultation Note    Reason for Consult:  The primary encounter diagnosis was Non-intractable cyclical vomiting with nausea. A diagnosis of Elevated LFTs was also pertinent to this visit.    PCP:   SUSAN Jo       Referring MD:  Aaareferral Self  No address on file    HPI:  This is a 17 y.o. male here for evaluation of cyclic vomiting.  Please see the consult note from recent hospital stay for full description of his history.  He was diagnosed with abdominal migraines as a child, had EGD and colon, and did fine for 11 years. Then recently started having recurrent attacks of nausea and vomiting.  HIDA with EF of 40% and had GB removed but no improvement, recent EGD was unremarkable.  States that he is fine in between attacks, denies THC. Most recent attack triggered at [employer]    Abdominal pain - no  Reflux - no  Dysphagia - no   Bowel habits - normal  GI bleeding - none  NSAID usage - occasional excedrin for migraines, but pretty rare    Interval history:  Tolerating elavil started in hospital  Multiple questions regarding leaky gut syndrome and MTHFR syndrome answered today, as well as the natural history and triggers for CVS.     ROS:  Constitutional: No fevers, chills, No weight loss  ENT: + seasonal allergies, + sinus infection  CV: No chest pain  Pulm: No cough, No shortness of breath  Ophtho: No vision changes  GI: see HPI  Derm: No rash  Heme: No lymphadenopathy, No bruising  MSK: No arthritis  : No dysuria, No hematuria  Endo: No hot or cold intolerance  Neuro: + migraines  Psych: No anxiety, No depression    Medical History:  has a past medical history of Abdominal migraine; Eczema; H/O multiple concussions; and Migraines.    Surgical History:  has a past surgical history that includes Adenoidectomy; Tonsillectomy; and Cholecystectomy (01/2018).    Family History: family history includes Diabetes in his maternal grandfather, maternal grandmother, paternal  "grandfather, and paternal grandmother; Heart disease in his mother and paternal grandfather; Hyperlipidemia in his paternal grandfather; Hypertension in his maternal grandfather, maternal grandmother, paternal grandfather, and paternal grandmother; Migraines in his maternal grandmother, mother, and sister; Other in his mother..     Social History:  reports that he is a non-smoker but has been exposed to tobacco smoke. He has never used smokeless tobacco. He reports that he drinks about 3.0 oz of alcohol per week . He reports that he does not use drugs.    Review of patient's allergies indicates:  No Known Allergies    Current Outpatient Prescriptions   Medication Sig Dispense Refill    amitriptyline (ELAVIL) 25 MG tablet Take 1 tablet (25 mg total) by mouth every evening. 30 tablet 2    betaine HCl 300 mg Tab Take by mouth before meals and at bedtime as needed.      hyoscyamine (LEVSIN/SL) 0.125 mg Subl Place 1 tablet (0.125 mg total) under the tongue 3 (three) times daily before meals. 90 tablet 0    promethazine (PHENERGAN) 12.5 MG Tab Take 12.5 mg by mouth 3 (three) times daily as needed.       No current facility-administered medications for this visit.            Objective Findings:    Vital Signs:  BP (!) 149/91   Pulse 98   Ht 5' 11" (1.803 m)   Wt 63.2 kg (139 lb 5.3 oz)   BMI 19.43 kg/m²   Body mass index is 19.43 kg/m².    Physical Exam:  General Appearance: Well appearing in no acute distress  Head:   Normocephalic, without obvious abnormality  Eyes:    No scleral icterus, EOMI  ENT: Neck supple, Lips, mucosa, and tongue normal; teeth and gums normal  Lungs: CTA bilaterally in anterior and posterior fields, no wheezes, no crackles.  Heart:  Regular rate and rhythm, S1, S2 normal, no murmurs heard  Abdomen: Soft, non tender, non distended with positive bowel sounds in all four quadrants. No hepatosplenomegaly, ascites, or mass  Extremities: 2+ pulses, no clubbing, cyanosis or edema  Skin: No " rash  Neurologic: CN II-XII intact      Labs:  Lab Results   Component Value Date    WBC 7.79 02/10/2018    HGB 14.1 02/10/2018    HCT 39.5 (L) 02/10/2018     02/10/2018    CHOL 161 07/08/2015    TRIG 120 07/08/2015    HDL 53 07/08/2015    ALT 15 02/15/2018    AST 16 02/15/2018     02/10/2018    K 4.0 02/10/2018     02/10/2018    CREATININE 1.0 02/10/2018    BUN 7 02/10/2018    CO2 23 02/10/2018    TSH 0.731 02/08/2018       No components found for: H. PYLORI IGG INTERP     24hr 5HIAA - wnl  Urine pbg - wnl  HAE markers - wnl    Imaging:  NM GES - wnl    Endoscopy:    EGD - 2017 wnl      Assessment:  1. Non-intractable cyclical vomiting with nausea    2. Elevated LFTs           Recommendations:  1. Titrate up elavil by 12.5 mg x 2 weeks then up to 50 mg, refill at 50 if tolerating well  2. Info printed and given. Discussed l carnitine and CoQ10 as alternative treatment but prefer TCA given my experience with this    Follow-up in about 3 months (around 5/26/2018).      Order summary:         Thank you so much for allowing me to participate in the care of Arian Breaux MD

## 2018-03-12 NOTE — ASSESSMENT & PLAN NOTE
He was recently discharged from hospital for apparent acute exacerbation of chronic recurrent abdominal migraines. Hospital records reviewed. He is not suffering from an abdominal migraine at present. He has follow-up appointment scheduled with gastroenterology. We discussed treatment options. It was agreed that he would follow up with gastroenterology as instructed and he will return to see me if he is unsatisfied with the treatments he receives, and we will discuss his options for obtaining 2nd opinion.

## 2018-03-16 ENCOUNTER — HOSPITAL ENCOUNTER (EMERGENCY)
Facility: HOSPITAL | Age: 18
Discharge: HOME | End: 2018-03-16
Attending: EMERGENCY MEDICINE
Payer: COMMERCIAL

## 2018-03-16 ENCOUNTER — PATIENT MESSAGE (OUTPATIENT)
Dept: GASTROENTEROLOGY | Facility: CLINIC | Age: 18
End: 2018-03-16

## 2018-03-16 VITALS
RESPIRATION RATE: 18 BRPM | WEIGHT: 145 LBS | HEART RATE: 103 BPM | BODY MASS INDEX: 20.3 KG/M2 | TEMPERATURE: 99 F | SYSTOLIC BLOOD PRESSURE: 125 MMHG | DIASTOLIC BLOOD PRESSURE: 72 MMHG | HEIGHT: 71 IN

## 2018-03-16 DIAGNOSIS — R11.10 INTRACTABLE VOMITING, PRESENCE OF NAUSEA NOT SPECIFIED, UNSPECIFIED VOMITING TYPE: Primary | ICD-10-CM

## 2018-03-16 LAB
ALBUMIN SERPL BCP-MCNC: 5.4 G/DL
ALP SERPL-CCNC: 207 U/L
ALT SERPL W/O P-5'-P-CCNC: 39 U/L
ANION GAP SERPL CALC-SCNC: 15 MMOL/L
AST SERPL-CCNC: 40 U/L
BASOPHILS # BLD AUTO: 0.02 K/UL
BASOPHILS NFR BLD: 0.2 %
BILIRUB SERPL-MCNC: 1.4 MG/DL
BUN SERPL-MCNC: 17 MG/DL
CALCIUM SERPL-MCNC: 10.9 MG/DL
CHLORIDE SERPL-SCNC: 103 MMOL/L
CO2 SERPL-SCNC: 22 MMOL/L
CREAT SERPL-MCNC: 1 MG/DL
DIFFERENTIAL METHOD: ABNORMAL
EOSINOPHIL # BLD AUTO: 0 K/UL
EOSINOPHIL NFR BLD: 0 %
ERYTHROCYTE [DISTWIDTH] IN BLOOD BY AUTOMATED COUNT: 12.4 %
EST. GFR  (AFRICAN AMERICAN): >60 ML/MIN/1.73 M^2
EST. GFR  (NON AFRICAN AMERICAN): >60 ML/MIN/1.73 M^2
GLUCOSE SERPL-MCNC: 154 MG/DL
HCT VFR BLD AUTO: 45.9 %
HGB BLD-MCNC: 16.5 G/DL
IMM GRANULOCYTES # BLD AUTO: 0.03 K/UL
IMM GRANULOCYTES NFR BLD AUTO: 0.3 %
LIPASE SERPL-CCNC: 6 U/L
LYMPHOCYTES # BLD AUTO: 0.7 K/UL
LYMPHOCYTES NFR BLD: 6.9 %
MCH RBC QN AUTO: 29.7 PG
MCHC RBC AUTO-ENTMCNC: 35.9 G/DL
MCV RBC AUTO: 83 FL
MONOCYTES # BLD AUTO: 0.4 K/UL
MONOCYTES NFR BLD: 3.8 %
NEUTROPHILS # BLD AUTO: 8.9 K/UL
NEUTROPHILS NFR BLD: 88.8 %
NRBC BLD-RTO: 0 /100 WBC
PLATELET # BLD AUTO: 201 K/UL
PMV BLD AUTO: 9 FL
POTASSIUM SERPL-SCNC: 4.2 MMOL/L
PROT SERPL-MCNC: 8.3 G/DL
RBC # BLD AUTO: 5.56 M/UL
SODIUM SERPL-SCNC: 140 MMOL/L
WBC # BLD AUTO: 9.99 K/UL

## 2018-03-16 PROCEDURE — 99284 EMERGENCY DEPT VISIT MOD MDM: CPT | Mod: 25

## 2018-03-16 PROCEDURE — 96361 HYDRATE IV INFUSION ADD-ON: CPT

## 2018-03-16 PROCEDURE — 96374 THER/PROPH/DIAG INJ IV PUSH: CPT

## 2018-03-16 PROCEDURE — 80053 COMPREHEN METABOLIC PANEL: CPT

## 2018-03-16 PROCEDURE — 83690 ASSAY OF LIPASE: CPT

## 2018-03-16 PROCEDURE — 99284 EMERGENCY DEPT VISIT MOD MDM: CPT | Mod: ,,, | Performed by: EMERGENCY MEDICINE

## 2018-03-16 PROCEDURE — 96375 TX/PRO/DX INJ NEW DRUG ADDON: CPT

## 2018-03-16 PROCEDURE — 85025 COMPLETE CBC W/AUTO DIFF WBC: CPT

## 2018-03-16 PROCEDURE — 25000003 PHARM REV CODE 250: Performed by: STUDENT IN AN ORGANIZED HEALTH CARE EDUCATION/TRAINING PROGRAM

## 2018-03-16 PROCEDURE — 63600175 PHARM REV CODE 636 W HCPCS: Performed by: EMERGENCY MEDICINE

## 2018-03-16 PROCEDURE — 63600175 PHARM REV CODE 636 W HCPCS: Performed by: STUDENT IN AN ORGANIZED HEALTH CARE EDUCATION/TRAINING PROGRAM

## 2018-03-16 RX ORDER — HALOPERIDOL 5 MG/ML
2.5 INJECTION INTRAMUSCULAR
Status: COMPLETED | OUTPATIENT
Start: 2018-03-16 | End: 2018-03-16

## 2018-03-16 RX ORDER — ACETAMINOPHEN 10 MG/ML
1000 INJECTION, SOLUTION INTRAVENOUS ONCE
Status: COMPLETED | OUTPATIENT
Start: 2018-03-16 | End: 2018-03-16

## 2018-03-16 RX ORDER — AMITRIPTYLINE HYDROCHLORIDE 25 MG/1
50 TABLET, FILM COATED ORAL NIGHTLY
Qty: 60 TABLET | Refills: 3 | Status: SHIPPED | OUTPATIENT
Start: 2018-03-16 | End: 2019-01-07

## 2018-03-16 RX ADMIN — SODIUM CHLORIDE, POTASSIUM CHLORIDE, SODIUM LACTATE AND CALCIUM CHLORIDE 1000 ML: 600; 310; 30; 20 INJECTION, SOLUTION INTRAVENOUS at 06:03

## 2018-03-16 RX ADMIN — ACETAMINOPHEN 1000 MG: 10 INJECTION, SOLUTION INTRAVENOUS at 08:03

## 2018-03-16 RX ADMIN — HALOPERIDOL LACTATE 2.5 MG: 5 INJECTION, SOLUTION INTRAMUSCULAR at 06:03

## 2018-03-16 NOTE — Clinical Note
Arian Farris discharge to home/self care.    - Condition at discharge: Good  - Mode of Discharge: by walking out   - The patient left the ED accompanied by a family member.  - The discharge instructions were discussed with the patient/parent.  - T hey state an understanding of the discharge instructions.  - Instructed patient/parent to go to the discharge window.

## 2018-03-16 NOTE — ED NOTES
"Pt developed vomiting last night, pt has had multiple episodes since he started. Pt states having "blackish bile a few times." Pt developed upper right abd pain with the vomiting.  "

## 2018-03-16 NOTE — ED PROVIDER NOTES
Encounter Date: 3/16/2018       History     Chief Complaint   Patient presents with    Emesis     Pt states he started throwing up last night at 2300 with generalized abd pain. He states that he was hospitalized last month and diagnosed with Cyclic vomiting syndrome. Pt tried zofran at home with no relief.     Abdominal Pain     Pt is a 17 yo M with cyclical vomiting syndrome who presents with emesis. Pt notes that all of a sudden at 11pm last night he had an episode of vomiting. Pt has vomited ~40x since last night. Pt has had an extensive workup for GI symptoms since age 6, a previous diagnosis of abdominal migraines that was stable until December when he had vomiting and abdominal pain. Pt had his GB removed in January, and then has had 4 episodes of vomiting that required hospitalization. Pt noted that there was some black dark vomit, and mostly green vomit that was high volume and then became less as patient emptied his stomach contents. Pt complains of sharp abdominal pain that is rated 8/10, occurred suddenly, is associated with vomiting. Normally the cycle lasts 8-10 hrs. Patient was given elavil as a medicine for the cyclical vomiting syndrome but it has not improved since.  Pt also notes hot/cold feelings, lightheaded, dizziness, weakness. Pt denies F/D/hematochezia/CP.           Review of patient's allergies indicates:  No Known Allergies  Past Medical History:   Diagnosis Date    Abdominal migraine     Eczema     H/O multiple concussions     Migraines      Past Surgical History:   Procedure Laterality Date    ADENOIDECTOMY      CHOLECYSTECTOMY  01/2018    TONSILLECTOMY       Family History   Problem Relation Age of Onset    Other Mother      Hypoglycemia    Heart disease Mother      Mitral valve prolapse    Migraines Mother     Migraines Sister     Hypertension Maternal Grandmother     Diabetes Maternal Grandmother     Migraines Maternal Grandmother     Hypertension Maternal Grandfather      Diabetes Maternal Grandfather     Hypertension Paternal Grandmother     Diabetes Paternal Grandmother     Hypertension Paternal Grandfather     Diabetes Paternal Grandfather     Heart disease Paternal Grandfather     Hyperlipidemia Paternal Grandfather     Short stature Neg Hx     Thyroid disease Neg Hx      Social History   Substance Use Topics    Smoking status: Passive Smoke Exposure - Never Smoker    Smokeless tobacco: Never Used      Comment: Dad smokes outside    Alcohol use 3.0 oz/week     5 Standard drinks or equivalent per week     Review of Systems   Constitutional: Positive for chills. Negative for fever and unexpected weight change.   HENT: Negative for hearing loss.    Eyes: Negative for visual disturbance.   Respiratory: Positive for shortness of breath.    Cardiovascular: Negative for chest pain.   Gastrointestinal: Positive for nausea and vomiting. Negative for abdominal pain, blood in stool, constipation and diarrhea.   Genitourinary: Negative for dysuria and hematuria.   Musculoskeletal: Negative for arthralgias.   Skin: Negative for rash.   Neurological: Positive for dizziness and light-headedness. Negative for seizures, syncope and weakness.   Hematological: Negative for adenopathy. Does not bruise/bleed easily.       Physical Exam     Initial Vitals [03/16/18 0600]   BP Pulse Resp Temp SpO2   (!) 157/99 99 18 98.8 °F (37.1 °C) --      MAP       118.33         Physical Exam    Vitals reviewed.  Constitutional: He appears well-developed and well-nourished. He is not diaphoretic. No distress.   HENT:   Head: Normocephalic and atraumatic.   Eyes: EOM are normal. Pupils are equal, round, and reactive to light.   Neck: Normal range of motion. No thyromegaly present. No tracheal deviation present.   Cardiovascular: Normal rate and regular rhythm.   No murmur heard.  Pulmonary/Chest: Breath sounds normal. He has no wheezes. He has no rales.   Abdominal: Soft. He exhibits no distension.  There is no tenderness.   Musculoskeletal: Normal range of motion.   Lymphadenopathy:     He has no cervical adenopathy.   Neurological: He is alert and oriented to person, place, and time. No sensory deficit.   Skin: Skin is warm and dry. No rash noted.         ED Course   Procedures  Labs Reviewed   CBC W/ AUTO DIFFERENTIAL - Abnormal; Notable for the following:        Result Value    MPV 9.0 (*)     Gran # (ANC) 8.9 (*)     Lymph # 0.7 (*)     Gran% 88.8 (*)     Lymph% 6.9 (*)     Mono% 3.8 (*)     All other components within normal limits   COMPREHENSIVE METABOLIC PANEL - Abnormal; Notable for the following:     CO2 22 (*)     Glucose 154 (*)     Calcium 10.9 (*)     Albumin 5.4 (*)     Total Bilirubin 1.4 (*)     Alkaline Phosphatase 207 (*)     All other components within normal limits   LIPASE                   APC / Resident Notes:   - Patient is a 18 y.o. male with cyclical vomiting syndrome, who presents with emesis.   - Suspect: cyclical vomiting syndrome exacerbation  - DDx: gastritis, pancreatitis  - Lab/rad/micro: CBC, CMP, lipase, UA  - Consult: NA  - Treatment: IVF, Haldol, IV tylenol  - Status: currently patient is vomiting/nauseous, stable, continue to monitor    8:08 a.m.  - Patient update: sleeping peacefully, denies abdominal pain, denies vomiting, denies nausea.  Patient appears better and wants to go home.  - Lab/Rad/Micro interpretation: CMP- hypercalcemia, elevated t bili and ALk phos. CBC- normal. Lipase- normal.   - Follow up: PCP  - Disposition: discharge.           Attending Attestation:   Physician Attestation Statement for Resident:  As the supervising MD   Physician Attestation Statement: I have personally seen and examined this patient.   I agree with the above history. -: 17 yo m, long h/o cyclic vomiting syndrome/abd migraine?, recent extensive work-up including NM emptying study, MRI abd, CT - all negative.  Here with recurrent sx, n n/v  And epigastric pain.  Plan to tx with  IVF/haldol/IV tylenol.  Labs unremarkable.  Think repeat imaging would be low yield at this point   As the supervising MD I agree with the above PE.    As the supervising MD I agree with the above treatment, course, plan, and disposition.  I have reviewed and agree with the residents interpretation of the following: lab data.  I have reviewed the following: old records at this facility.                       Clinical Impression:   The encounter diagnosis was Intractable vomiting, presence of nausea not specified, unspecified vomiting type.                           Marianne Fernandes MD  03/18/18 [id number]

## 2018-03-16 NOTE — TELEPHONE ENCOUNTER
----- Message from Cee Belle sent at 3/16/2018  9:22 AM CDT -----  Contact: Mother- Kay- [phone]                                Prescription Refill Request  Name of medication and dose amitriptyline (ELAVIL) 25 MG tablet    Pharmacy [id number] - [place] - [address] [phone]      Are you completely out of your medication YES    Additional Information:

## 2018-05-01 ENCOUNTER — OFFICE VISIT (OUTPATIENT)
Dept: GASTROENTEROLOGY | Facility: CLINIC | Age: 18
End: 2018-05-01
Payer: COMMERCIAL

## 2018-05-01 VITALS
SYSTOLIC BLOOD PRESSURE: 133 MMHG | DIASTOLIC BLOOD PRESSURE: 81 MMHG | HEIGHT: 71 IN | HEART RATE: 91 BPM | WEIGHT: 142.63 LBS | BODY MASS INDEX: 19.97 KG/M2

## 2018-05-01 DIAGNOSIS — R11.15 NON-INTRACTABLE CYCLICAL VOMITING WITH NAUSEA: Primary | ICD-10-CM

## 2018-05-01 PROCEDURE — 99213 OFFICE O/P EST LOW 20 MIN: CPT | Mod: S$GLB,,, | Performed by: INTERNAL MEDICINE

## 2018-05-01 PROCEDURE — 99999 PR PBB SHADOW E&M-EST. PATIENT-LVL III: CPT | Mod: PBBFAC,,, | Performed by: INTERNAL MEDICINE

## 2018-05-01 NOTE — PROGRESS NOTES
[facility] Consultation Note    Reason for Consult:  The encounter diagnosis was Non-intractable cyclical vomiting with nausea.    PCP:   SUSAN Jo       Referring MD:  No referring provider defined for this encounter.    HPI:  This is a 18 y.o. male here for evaluation of cyclic vomiting.  Please see the consult note from recent hospital stay for full description of his history.  He was diagnosed with abdominal migraines as a child, had EGD and colon, and did fine for 11 years. Then recently started having recurrent attacks of nausea and vomiting.  HIDA with EF of 40% and had GB removed but no improvement, recent EGD was unremarkable.  States that he is fine in between attacks, denies THC. Most recent attack triggered at [employer]    Abdominal pain - no  Reflux - no  Dysphagia - no   Bowel habits - normal  GI bleeding - none  NSAID usage - occasional excedrin for migraines, but pretty rare    Interval history:  Up to 50 mg on the elavil. Some somnolence  No further flareups   Takes levsin prn bad foods  No further migraines      ROS:  Constitutional: No fevers, chills, No weight loss  ENT: + seasonal allergies, + sinus infection  CV: No chest pain  Pulm: No cough, No shortness of breath  Ophtho: No vision changes  GI: see HPI  Derm: No rash  Heme: No lymphadenopathy, No bruising  MSK: No arthritis  : No dysuria, No hematuria  Endo: No hot or cold intolerance  Neuro: + migraines  Psych: No anxiety, No depression    Medical History:  has a past medical history of Abdominal migraine; Eczema; H/O multiple concussions; and Migraines.    Surgical History:  has a past surgical history that includes Adenoidectomy; Tonsillectomy; and Cholecystectomy (01/2018).    Family History: family history includes Diabetes in his maternal grandfather, maternal grandmother, paternal grandfather, and paternal grandmother; Heart disease in his mother and paternal grandfather; Hyperlipidemia in his  "paternal grandfather; Hypertension in his maternal grandfather, maternal grandmother, paternal grandfather, and paternal grandmother; Migraines in his maternal grandmother, mother, and sister; Other in his mother..     Social History:  reports that he is a non-smoker but has been exposed to tobacco smoke. He has never used smokeless tobacco. He reports that he drinks about 3.0 oz of alcohol per week . He reports that he does not use drugs.    Review of patient's allergies indicates:  No Known Allergies    Current Outpatient Prescriptions   Medication Sig Dispense Refill    amitriptyline (ELAVIL) 25 MG tablet Take 2 tablets (50 mg total) by mouth every evening. 60 tablet 3    betaine HCl 300 mg Tab Take by mouth before meals and at bedtime as needed.      promethazine (PHENERGAN) 12.5 MG Tab Take 12.5 mg by mouth 3 (three) times daily as needed.      hyoscyamine (LEVSIN/SL) 0.125 mg Subl Place 1 tablet (0.125 mg total) under the tongue 3 (three) times daily before meals. 90 tablet 0     No current facility-administered medications for this visit.            Objective Findings:    Vital Signs:  /81   Pulse 91   Ht 5' 11" (1.803 m)   Wt 64.7 kg (142 lb 10.2 oz)   BMI 19.89 kg/m²   Body mass index is 19.89 kg/m².    Physical Exam:  General Appearance: Well appearing in no acute distress  Head:   Normocephalic, without obvious abnormality  Eyes:    No scleral icterus, EOMI  ENT: Neck supple, Lips, mucosa, and tongue normal; teeth and gums normal  Lungs: CTA bilaterally in anterior and posterior fields, no wheezes, no crackles.  Heart:  Regular rate and rhythm, S1, S2 normal, no murmurs heard  Abdomen: Soft, non tender, non distended with positive bowel sounds in all four quadrants. No hepatosplenomegaly, ascites, or mass  Extremities: 2+ pulses, no clubbing, cyanosis or edema  Skin: No rash  Neurologic: CN II-XII intact      Labs:  Lab Results   Component Value Date    WBC 9.99 03/16/2018    HGB 16.5 " 03/16/2018    HCT 45.9 03/16/2018     03/16/2018    CHOL 161 07/08/2015    TRIG 120 07/08/2015    HDL 53 07/08/2015    ALT 39 03/16/2018    AST 40 03/16/2018     03/16/2018    K 4.2 03/16/2018     03/16/2018    CREATININE 1.0 03/16/2018    BUN 17 03/16/2018    CO2 22 (L) 03/16/2018    TSH 0.731 02/08/2018       No components found for: H. PYLORI IGG INTERP     24hr 5HIAA - wnl  Urine pbg - wnl  HAE markers - wnl    Imaging:  NM GES - wnl    Endoscopy:    EGD - 2017 wnl      Assessment:  1. Non-intractable cyclical vomiting with nausea           Recommendations:  1. Continue elavil 50 mg  2. Contact us prn    Follow-up in about 5 months (around 10/4/2018).      Order summary:         Thank you so much for allowing me to participate in the care of Arian Breaux MD

## 2018-06-01 ENCOUNTER — OFFICE VISIT (OUTPATIENT)
Dept: ENDOCRINOLOGY | Facility: CLINIC | Age: 18
End: 2018-06-01
Payer: COMMERCIAL

## 2018-06-01 VITALS
DIASTOLIC BLOOD PRESSURE: 72 MMHG | WEIGHT: 143.75 LBS | HEIGHT: 71 IN | BODY MASS INDEX: 20.12 KG/M2 | HEART RATE: 74 BPM | SYSTOLIC BLOOD PRESSURE: 110 MMHG

## 2018-06-01 DIAGNOSIS — R10.13 EPIGASTRIC PAIN: ICD-10-CM

## 2018-06-01 DIAGNOSIS — R68.89 ABNORMAL ENDOCRINE LABORATORY TEST FINDING: Primary | ICD-10-CM

## 2018-06-01 PROCEDURE — 99999 PR PBB SHADOW E&M-EST. PATIENT-LVL III: CPT | Mod: PBBFAC,,, | Performed by: INTERNAL MEDICINE

## 2018-06-01 PROCEDURE — 99244 OFF/OP CNSLTJ NEW/EST MOD 40: CPT | Mod: S$GLB,,, | Performed by: INTERNAL MEDICINE

## 2018-06-01 NOTE — LETTER
June 1, 2018      Jeffrey Jewell MD  [address]  [place] [address]           [facility]  [address]  [place] [address]  Phone: [phone]  Fax: [phone]          Patient: Arian Farris   MR Number: [id number]   YOB: 2000   Date of Visit: 6/1/2018       Dear Dr. Jeffrey Jewell:    Thank you for referring Arian Farris to me for evaluation. Attached you will find relevant portions of my assessment and plan of care.    If you have questions, please do not hesitate to call me. I look forward to following Arian Farris along with you.    Sincerely,    Minor Disla MD    Enclosure  CC:  No Recipients    If you would like to receive this communication electronically, please contact [email or web address] or [phone] to request more information on [employer] Link access.    For providers and/or their staff who would like to refer a patient to [facility], please contact us through our one-stop-shop provider referral line, [facility] [place], at [phone].    If you feel you have received this communication in error or would no longer like to receive these types of communications, please e-mail [email or web address]

## 2018-06-01 NOTE — PROGRESS NOTES
Subjective:      Patient ID: Duke Farris is a 18 y.o. male.    Chief Complaint:  Other Misc (low early AM cortisol level)    Referral from Dr. Jewell    History of Present Illness  Mr. Farris presents for evaluation for possible adrenal insufficiency in the setting of a low AM cortisol level.    Started having nausea and vomiting in Dec 2017. Had extensive workup in 12/2017 and was felt to be related to cholecystitis and had cholecystectomy which did not alleviate the symptoms.     Still has intermittent episodes of abdominal pain associated with nausea and vomiting. When the episodes occur he can't stop vomiting unless he comes to the hospital and receives IV fluids and anti-emetics. Last episode was in 3/2018.    Has occ orthostatic dizziness and lightheadedness. Has never lost conciousness. Denies hypotension in the past. Denies any hyperpigmentation. No documented hyperkalemia or hyponatremia.     Results for [facility] (MRN [id number]) as of 6/1/2018 11:32   Ref. Range 2/8/2018 13:01 2/9/2018 06:32   Cortisol -8 AM Latest Ref Range: 4.30 - 22.40 ug/dL  4.2 (L)     Denies having any oral steroid therapy or steroid shots in the past.     Has had multiple concussions in the past.     Review of Systems   Constitutional: Negative for unexpected weight change.   Eyes: Negative for visual disturbance.   Respiratory: Negative for shortness of breath.    Cardiovascular: Negative for chest pain.   Gastrointestinal: Negative for abdominal pain.   Endocrine: Negative for cold intolerance.   Genitourinary: Negative for frequency.   Musculoskeletal: Negative for arthralgias.   Skin: Negative for rash.   Neurological: Negative for headaches.       Objective:   Physical Exam   Constitutional: He is oriented to person, place, and time. He appears well-developed and well-nourished.   HENT:   Head: Normocephalic and atraumatic.   Right Ear: External ear normal.   Left Ear: External ear normal.   Nose: Nose normal.    Neck: No tracheal deviation present. No thyromegaly present.   Cardiovascular: Normal rate, regular rhythm and normal heart sounds.    No edema   Pulmonary/Chest: Effort normal and breath sounds normal.   Abdominal: Soft. Bowel sounds are normal. There is no tenderness.   Musculoskeletal:   Normal gait, no cyanosis or clubbing   Neurological: He is alert and oriented to person, place, and time. He has normal reflexes.   Vibration sense intact   Skin: Skin is warm and dry. No rash noted.   No nodules, no ulcers   Psychiatric: He has a normal mood and affect. Judgment normal.   Vitals reviewed.      Lab Review:   Results for [facility] (MRN [id number]) as of 6/1/2018 07:18   Ref. Range 3/16/2018 06:12   Sodium Latest Ref Range: 136 - 145 mmol/L 140   Potassium Latest Ref Range: 3.5 - 5.1 mmol/L 4.2   Chloride Latest Ref Range: 95 - 110 mmol/L 103   CO2 Latest Ref Range: 23 - 29 mmol/L 22 (L)   Anion Gap Latest Ref Range: 8 - 16 mmol/L 15   BUN, Bld Latest Ref Range: 6 - 20 mg/dL 17   Creatinine Latest Ref Range: 0.5 - 1.4 mg/dL 1.0   eGFR if non African American Latest Ref Range: >60 mL/min/1.73 m^2 >60.0   eGFR if African American Latest Ref Range: >60 mL/min/1.73 m^2 >60.0   Glucose Latest Ref Range: 70 - 110 mg/dL 154 (H)   Calcium Latest Ref Range: 8.7 - 10.5 mg/dL 10.9 (H)   Alkaline Phosphatase Latest Ref Range: 55 - 135 U/L 207 (H)   Total Protein Latest Ref Range: 6.0 - 8.4 g/dL 8.3   Albumin Latest Ref Range: 3.5 - 5.2 g/dL 5.4 (H)   Total Bilirubin Latest Ref Range: 0.1 - 1.0 mg/dL 1.4 (H)   AST Latest Ref Range: 10 - 40 U/L 40   ALT Latest Ref Range: 10 - 44 U/L 39   Lipase Latest Ref Range: 4 - 60 U/L 6     Results for DUKE RO (MRN [id number]) as of 6/1/2018 07:18   Ref. Range 2/9/2018 17:00   5-HIAA, Ur Latest Ref Range: 0 - 14 mg/gCR 3   5-HIAA, URINE MG/L Latest Units: mg/L 1.3   5-HIAA, 24H Ur Latest Ref Range: 0 - 15 mg/d 6   Creatinine, urine - per volume Latest Units: mg/dL 41    Hours Collected Latest Units: hr 24   Urine Total Volume Latest Units: mL 4975       Hours Collected hr 24VC    Urine Total Volume mL 4975VC    Creatinine, urine - per volume mg/dL 38VC    Comment: Corrected result; previously reported as 37 on 02/12/2018 at 14:23.   Creatinine, urine - per 24 hours 1000 - 2500 mg/d 1890VC    Comment: Performed by [facility],                               [address] [place] [address],[place] [address] [phone]                     [email or web address], Warner Vera MD - Lab. Director   Corrected result; previously reported as 1841 on 02/12/2018 at 14:23.    Metanephrines, 24H Ur 62 - 207 ug/d 184    Metanephrines, urine - ratio to CRT 0 - 300 ug/g CRT 97    Metanephrines, urine - per volume ug/L 37    Normetanephrine, 24H Ur 95 - 379 ug/d 274    Normetanephrines, urine - ratio to CRT 0 - 400 ug/g     Normetanephrines, urine - per volume ug/L 55      Results for DUKE RO (MRN [id number]) as of 6/1/2018 07:18   Ref. Range 2/8/2018 13:01   TSH Latest Ref Range: 0.400 - 4.000 uIU/mL 0.731   T4 Total Latest Ref Range: 4.5 - 11.5 ug/dL 7.0       Assessment:     1. Abnormal endocrine laboratory test finding    2. Epigastric pain      Plan:     --Patient found to have low early AM cortisol level in the setting of intermittent episodes of abdominal pain and vomiting  --Has had an extensive w/u without a cause for the symptoms  --Denies receiving any steroid shots or oral steroids around the time the labs were drawn  --Presentation would be atypical for AI but still need to r/o as early AM cortisol was quite low  --Will repeat early AM cortisol with ACTH and if equivocal (cortisol <12) will need ACTH stimulation test  --Had elevated calcium on last set of labs but his may have been an erroneous lab draw (will check PTH, renal panel and vitamin D)  --Check TFT's  --Metanephrines and 5 HIAA were normal    Copy to Dr. Best Disla M.D. Staff Endocrinology

## 2018-06-04 ENCOUNTER — LAB VISIT (OUTPATIENT)
Dept: LAB | Facility: HOSPITAL | Age: 18
End: 2018-06-04
Attending: INTERNAL MEDICINE
Payer: COMMERCIAL

## 2018-06-04 ENCOUNTER — PATIENT MESSAGE (OUTPATIENT)
Dept: ENDOCRINOLOGY | Facility: CLINIC | Age: 18
End: 2018-06-04

## 2018-06-04 DIAGNOSIS — R79.89 LOW SERUM CORTISOL LEVEL: Primary | ICD-10-CM

## 2018-06-04 DIAGNOSIS — R68.89 ABNORMAL ENDOCRINE LABORATORY TEST FINDING: ICD-10-CM

## 2018-06-04 DIAGNOSIS — R10.13 EPIGASTRIC PAIN: ICD-10-CM

## 2018-06-04 LAB
25(OH)D3+25(OH)D2 SERPL-MCNC: 29 NG/ML
ALBUMIN SERPL BCP-MCNC: 4.2 G/DL
ANION GAP SERPL CALC-SCNC: 8 MMOL/L
BUN SERPL-MCNC: 14 MG/DL
CALCIUM SERPL-MCNC: 9.6 MG/DL
CHLORIDE SERPL-SCNC: 106 MMOL/L
CO2 SERPL-SCNC: 26 MMOL/L
CORTIS SERPL-MCNC: 8.8 UG/DL
CREAT SERPL-MCNC: 1 MG/DL
EST. GFR  (AFRICAN AMERICAN): >60 ML/MIN/1.73 M^2
EST. GFR  (NON AFRICAN AMERICAN): >60 ML/MIN/1.73 M^2
GLUCOSE SERPL-MCNC: 89 MG/DL
PHOSPHATE SERPL-MCNC: 4.4 MG/DL
POTASSIUM SERPL-SCNC: 4.1 MMOL/L
PTH-INTACT SERPL-MCNC: 47 PG/ML
SODIUM SERPL-SCNC: 140 MMOL/L
T4 FREE SERPL-MCNC: 0.77 NG/DL
TSH SERPL DL<=0.005 MIU/L-ACNC: 1.11 UIU/ML

## 2018-06-04 PROCEDURE — 83970 ASSAY OF PARATHORMONE: CPT

## 2018-06-04 PROCEDURE — 84439 ASSAY OF FREE THYROXINE: CPT

## 2018-06-04 PROCEDURE — 82533 TOTAL CORTISOL: CPT

## 2018-06-04 PROCEDURE — 80069 RENAL FUNCTION PANEL: CPT

## 2018-06-04 PROCEDURE — 82024 ASSAY OF ACTH: CPT

## 2018-06-04 PROCEDURE — 82306 VITAMIN D 25 HYDROXY: CPT

## 2018-06-04 PROCEDURE — 36415 COLL VENOUS BLD VENIPUNCTURE: CPT

## 2018-06-04 PROCEDURE — 84443 ASSAY THYROID STIM HORMONE: CPT

## 2018-06-04 RX ORDER — COSYNTROPIN 0.25 MG/ML
0.25 INJECTION, POWDER, FOR SOLUTION INTRAMUSCULAR; INTRAVENOUS ONCE
Status: COMPLETED | OUTPATIENT
Start: 2018-06-04 | End: 2018-06-11

## 2018-06-05 ENCOUNTER — PATIENT MESSAGE (OUTPATIENT)
Dept: ENDOCRINOLOGY | Facility: CLINIC | Age: 18
End: 2018-06-05

## 2018-06-05 LAB — ACTH PLAS-MCNC: 14 PG/ML

## 2018-06-11 ENCOUNTER — PATIENT MESSAGE (OUTPATIENT)
Dept: ENDOCRINOLOGY | Facility: CLINIC | Age: 18
End: 2018-06-11

## 2018-06-11 ENCOUNTER — CLINICAL SUPPORT (OUTPATIENT)
Dept: ENDOCRINOLOGY | Facility: CLINIC | Age: 18
End: 2018-06-11
Payer: COMMERCIAL

## 2018-06-11 VITALS
HEART RATE: 62 BPM | HEIGHT: 71 IN | WEIGHT: 143.5 LBS | DIASTOLIC BLOOD PRESSURE: 76 MMHG | SYSTOLIC BLOOD PRESSURE: 118 MMHG | BODY MASS INDEX: 20.09 KG/M2

## 2018-06-11 DIAGNOSIS — R79.89 LOW SERUM CORTISOL LEVEL: ICD-10-CM

## 2018-06-11 LAB
CORTIS SERPL-MCNC: 20.4 UG/DL
CORTIS SERPL-MCNC: 22.1 UG/DL
CORTIS SERPL-MCNC: 3.5 UG/DL

## 2018-06-11 PROCEDURE — 99999 PR PBB SHADOW E&M-EST. PATIENT-LVL II: CPT | Mod: PBBFAC,,,

## 2018-06-11 PROCEDURE — 99211 OFF/OP EST MAY X REQ PHY/QHP: CPT | Mod: S$GLB,,, | Performed by: INTERNAL MEDICINE

## 2018-06-11 PROCEDURE — 82024 ASSAY OF ACTH: CPT

## 2018-06-11 PROCEDURE — 82533 TOTAL CORTISOL: CPT | Mod: 91

## 2018-06-11 RX ADMIN — COSYNTROPIN 0.25 MG: 0.25 INJECTION, POWDER, FOR SOLUTION INTRAMUSCULAR; INTRAVENOUS at 09:06

## 2018-06-11 NOTE — PROGRESS NOTES
"0840- Patient ambulating to procedure room.  Denies any complaints.    0848- IV Saline lock started to Right AC #20 jelco with first attempt.  Secured with tegaderm.    0850- Blood obtained for labs as ordered. Patient complaining of "feeling faint".  Pale in appearance.  Skin moist to touch.  B/P 102/78.  Wet towel to face.  Ramo Hidalgo RN Supervisor in to see patient.  Checked fingerstick blood sugar with results 73.  In a mater of approximately 6 mins.  Patient's skin color appears slightly pink.  Patient explains that, starting to feel better.  Patient states "This has happened to me a couple of times before".  I asked patient if he felt anxious or frightened by the blood he saw when starting the IV.  Patient states, "No. I have been stuck many times before".    0914- Dr. Zavala here to give medication IVP as ordered per Dr. Zavala.      30 mins and 60 mins Cortisol labs drawn and taken to lab.  Patient tolerating well.  Orange juice given.  IV Saline lock to right AC discontinued #20 jelco intact.  Pressure dressing applied.  Patient discharged from clinic without complaints.  In NAD.  "

## 2018-06-12 LAB — ACTH PLAS-MCNC: 11 PG/ML

## 2018-07-23 ENCOUNTER — LAB VISIT (OUTPATIENT)
Dept: LAB | Facility: HOSPITAL | Age: 18
End: 2018-07-23
Attending: MEDICAL GENETICS
Payer: COMMERCIAL

## 2018-07-23 ENCOUNTER — OFFICE VISIT (OUTPATIENT)
Dept: GENETICS | Facility: CLINIC | Age: 18
End: 2018-07-23
Payer: COMMERCIAL

## 2018-07-23 VITALS — WEIGHT: 144.19 LBS | HEIGHT: 70 IN | BODY MASS INDEX: 20.64 KG/M2

## 2018-07-23 DIAGNOSIS — R10.13 EPIGASTRIC PAIN: ICD-10-CM

## 2018-07-23 DIAGNOSIS — R11.15 NON-INTRACTABLE CYCLICAL VOMITING WITHOUT NAUSEA: ICD-10-CM

## 2018-07-23 DIAGNOSIS — R11.15 NON-INTRACTABLE CYCLICAL VOMITING WITHOUT NAUSEA: Primary | ICD-10-CM

## 2018-07-23 DIAGNOSIS — E27.49 CORTISOL DEFICIENCY: ICD-10-CM

## 2018-07-23 PROCEDURE — 84210 ASSAY OF PYRUVATE: CPT

## 2018-07-23 PROCEDURE — 99245 OFF/OP CONSLTJ NEW/EST HI 55: CPT | Mod: S$GLB,,, | Performed by: MEDICAL GENETICS

## 2018-07-23 PROCEDURE — 82533 TOTAL CORTISOL: CPT

## 2018-07-23 PROCEDURE — 82140 ASSAY OF AMMONIA: CPT

## 2018-07-23 PROCEDURE — 82024 ASSAY OF ACTH: CPT

## 2018-07-23 PROCEDURE — 99999 PR PBB SHADOW E&M-EST. PATIENT-LVL II: CPT | Mod: PBBFAC,,, | Performed by: MEDICAL GENETICS

## 2018-07-23 PROCEDURE — 82139 AMINO ACIDS QUAN 6 OR MORE: CPT

## 2018-07-23 PROCEDURE — 83605 ASSAY OF LACTIC ACID: CPT

## 2018-07-23 PROCEDURE — 36415 COLL VENOUS BLD VENIPUNCTURE: CPT | Mod: PO

## 2018-07-24 LAB
AMMONIA PLAS-SCNC: 39 UMOL/L
CORTIS SERPL-MCNC: 9.9 UG/DL
LACTATE SERPL-SCNC: 0.7 MMOL/L

## 2018-07-24 NOTE — PROGRESS NOTES
Arian Farris   DOS: 16  : [birth year]  MRN: [id number]    REFERRING MD: Kevin Jo    REASON FOR CONSULT: Our medical genetics service was asked to evaluate this 18-year-old white male a possible genetic etiology of his cyclic vomiting.    HISTORY OF PRESENT ILLNESS:  Arian had issues with vomiting since 6 years of age that at times required hospitalizations. He was investigated by GI but no cause was found and he was thought to have abdominal migraine. He did not have any severe episode requiring hospitalization for 11 years until recently when he started having severe episodes again. Therere no triggers and he does not have a diagnosis of anxiety. Hes been evaluated by endocrine who thought that his morning cortisol was low but apparently ACTH challenge was normal. He was recommended to see genetics to rule out metabolic disorder.    PAST MEDICAL HISTORY: as above.    MEDICATIONS: amitriptyline (ELAVIL) 25 MG tablet    promethazine (PHENERGAN) 12.5 MG Tab     ALLERGIES: NKDA.    DEVELOPMENTAL HISTORY: normal.     FAMILY HISTORY: Arain has a 23-year-old sister free of these symptoms. The moms 49 and dads 52. Ffamily history is unremarkable and consanguinity was denied.     PHYSICAL EXAMINATION:  VITAL SIGNS:  Weight 144 lbs, height 510. BMI 20.4.  HEENT:   Arian's head is normocephalic. He had no dysmorphic facial features. His palate was slightly high arched. He wore glasses.  NECK:  supple.  CHEST:  Normally formed  HEART:  Regular rate and rhythm.  ABDOMEN:  soft.  MUSCULOSKELETAL: no anomalies.  NEUROLOGIC: Normal tone and strength. Normal language and cognition.    IMPRESSION:  At this time, I had an extensive discussion with the mother and the patient that we do not quite know what causes cyclic vomiting syndrome and a variety of causes have been proposed with none of them definitively explaining it. Metabolic disorders such as fatty acids oxidation disorders and recurrent ketotic  hypoglycemia are on a main differential so Reuben obtained a workup below.     The main stay of treatment is dietary modifications such as regular caloric intake to preclude him from fasting as vomiting series are more likely to occur during stress on the body such as sickness, fasting or dehydration. Hes on amitriptyline which can help prevent vomiting and ease pain.     Id like to discuss his cortisol and ACTH with his endocrinologist. Even though ACTH stimulation test is normal, he still had several values of low cortisol in am and ACTH was not elevated as would be expected from feedback inhibition/stimulation. I wonder if theres a component of pituitary insufficiency that drops ACTH at night resulting in low early cortisol. While pituitary insufficiency is not a common cause of cyclic vomiting, hypothalamic-pituitary-adrenal axis is known to be involved.    RECOMMENDATIONS:  1. Plasma acylcarnitine and carnitine, plasma amino and organic acids, urine organic acids and acylglycines, CPK, lactic acid and ammonia.  2. Discuss a possibility of pituitary insufficiency.  3. Regular caloric intake to avoid fasting and dehydration.  8.  Follow up in 3 months.    Time spent: 80 minutes, more than 50% counseling.  The note is in epic.    Joe Manrique M.D.  Section Head - Medical Genetics   [facility]

## 2018-07-25 ENCOUNTER — PATIENT MESSAGE (OUTPATIENT)
Dept: GENETICS | Facility: CLINIC | Age: 18
End: 2018-07-25

## 2018-07-26 LAB
3 METHYLGLUTARYLCARNITINE, C6-DC: 0.04 NMOL/ML
3 OH DECENOYLCARNITINE, C10:1 OH: 0.03 NMOL/ML
3 OH DODEDENOYLCARNITINE, C12:1 OH: 0.03 NMOL/ML
3 OH ISOBUTYRYLCARNITINE, C4-OH: 0.02 NMOL/ML
3 OH ISOVALERYLCARITINE, C5 OH: 0.02 NMOL/ML
3 OH OCTADECANOYLCARITINE C 18-OH: 0.01 NMOL/ML
3OH-DODECANOYLCARN SERPL-SCNC: 0.01 NMOL/ML
3OH-HEXANOYLCARN SERPL-SCNC: 0.01 NMOL/ML
3OH-LINOLEOYLCARN SERPL-SCNC: <0.02 NMOL/ML
3OH-OLEOYLCARN SERPL-SCNC: 0.01 NMOL/ML
3OH-PALMITOLEYLCARN SERPL-SCNC: 0.02 NMOL/ML
3OH-PALMITOYLCARN SERPL-SCNC: 0.01 NMOL/ML
3OH-TDECANOYLCARN SERPL-SCNC: 0.02 NMOL/ML
3OH-TDECENOYLCARN SERPL-SCNC: 0.02 NMOL/ML
ACETYLCARN SERPL-SCNC: 5.92 NMOL/ML (ref 2–17.83)
ACRYLYLCARNITINE, C3:1: <0.02 NMOL/ML
ACYLCARNITINE PATTERN SERPL-IMP: NORMAL
ANNOTATION COMMENT IMP: NORMAL
BENZOYLCARNITINE: <0.01 NMOL/ML
DECADIONOYLCARNITINE, C10:2: <0.05 NMOL/ML
DECANOYLCARN SERPL-SCNC: 0.24 NMOL/ML
DECENOYLCARN SERPL-SCNC: 0.19 NMOL/ML
DODECANEDIOYLCARNITINE, C12-DC: 0.01 NMOL/ML
DODECANOYLCARN SERPL-SCNC: 0.1 NMOL/ML
DODECENOYLCARN SERPL-SCNC: 0.08 NMOL/ML
FORMIMINOGLUTAMATE, FIGLU: <0.01 NMOL/ML
GLUTARYLCARN SERPL-SCNC: 0.08 NMOL/ML
HEPTANOYLCARNITINE, C7: 0.01 NMOL/ML
HEXANOYLCARN SERPL-SCNC: 0.05 NMOL/ML
HEXENOLYLCARNITINE, C6:1: 0.01 NMOL/ML
ISOBUTYRYLCARN SERPL-SCNC: 0.15 NMOL/ML
ISOVALERYL+MEBUTYRYLCARN SERPL-SCNC: 0.1 NMOL/ML
LINOLEOYLCARN SERPL-SCNC: 0.06 NMOL/ML
MALONYLCARNITINE, C3-DC: 0.07 NMOL/ML
METHYLMALONYL SUCCINYLCARN, C4-DC: 0.03 NMOL/ML
OCTANEDIOYLCARNITINE, C8-DC: 0.01 NMOL/ML
OCTANOYLCARN SERPL-SCNC: 0.19 NMOL/ML
OCTENOYLCARN SERPL-SCNC: 0.27 NMOL/ML
OLEOYLCARN SERPL-SCNC: 0.13 NMOL/ML
PALMITOLEYLCARN SERPL-SCNC: 0.02 NMOL/ML
PALMITOYLCARN SERPL-SCNC: 0.1 NMOL/ML
PHENYLACETYLCARNITINE: <0.02 NMOL/ML
PROPIONYLCARN SERPL-SCNC: 0.35 NMOL/ML
PYRUVATE BLD-SCNC: 0.05 MMOL/L (ref 0.03–0.11)
SALICYLCARNITINE: <0.05 NMOL/ML
STEAROYLCARN SERPL-SCNC: 0.06 NMOL/ML
TDECADIENOYLCARN SERPL-SCNC: 0.04 NMOL/ML
TDECANOYLCARN SERPL-SCNC: 0.03 NMOL/ML
TDECENOYLCARN SERPL-SCNC: 0.07 NMOL/ML
TIGLYLCARNITINE, C5:1: 0.01 NMOL/ML

## 2018-07-27 LAB
ACTH PLAS-MCNC: 12 PG/ML
ACYLCARNITINE SERPL-SCNC: 8 UMOL/L (ref 3–38)
CARNITINE FREE SERPL-SCNC: 35 UMOL/L (ref 22–63)
CARNITINE SERPL-SCNC: 0.2 UMOL/L (ref 0.1–0.9)
CARNITINE SERPL-SCNC: 43 UMOL/L (ref 31–78)

## 2018-07-28 ENCOUNTER — PATIENT MESSAGE (OUTPATIENT)
Dept: ENDOCRINOLOGY | Facility: CLINIC | Age: 18
End: 2018-07-28

## 2018-07-28 DIAGNOSIS — R68.89 ABNORMAL ENDOCRINE LABORATORY TEST FINDING: ICD-10-CM

## 2018-07-28 DIAGNOSIS — R68.89 ABNORMAL ENDOCRINE LABORATORY TEST FINDING: Primary | ICD-10-CM

## 2018-07-28 LAB — AMINO ACID SCREEN: NORMAL

## 2018-07-28 RX ORDER — COSYNTROPIN 0.25 MG/ML
0.25 INJECTION, POWDER, FOR SOLUTION INTRAMUSCULAR; INTRAVENOUS ONCE
Status: DISCONTINUED | OUTPATIENT
Start: 2018-07-28 | End: 2020-02-21

## 2018-07-30 ENCOUNTER — TELEPHONE (OUTPATIENT)
Dept: ENDOCRINOLOGY | Facility: CLINIC | Age: 18
End: 2018-07-30

## 2018-07-30 ENCOUNTER — PATIENT MESSAGE (OUTPATIENT)
Dept: ENDOCRINOLOGY | Facility: CLINIC | Age: 18
End: 2018-07-30

## 2018-08-03 ENCOUNTER — CLINICAL SUPPORT (OUTPATIENT)
Dept: ENDOCRINOLOGY | Facility: CLINIC | Age: 18
End: 2018-08-03
Payer: COMMERCIAL

## 2018-08-03 DIAGNOSIS — R68.89 ABNORMAL ENDOCRINE LABORATORY TEST FINDING: ICD-10-CM

## 2018-08-03 LAB
CORTIS SERPL-MCNC: 14.5 UG/DL
CORTIS SERPL-MCNC: 18 UG/DL
CORTIS SERPL-MCNC: 8.4 UG/DL
FSH SERPL-ACNC: 2.8 MIU/ML
LH SERPL-ACNC: 1.4 MIU/ML
PROLACTIN SERPL IA-MCNC: 4.6 NG/ML
T4 FREE SERPL-MCNC: 0.92 NG/DL
TESTOST SERPL-MCNC: 602 NG/DL
TSH SERPL DL<=0.005 MIU/L-ACNC: 1.38 UIU/ML

## 2018-08-03 PROCEDURE — 83002 ASSAY OF GONADOTROPIN (LH): CPT

## 2018-08-03 PROCEDURE — 84146 ASSAY OF PROLACTIN: CPT

## 2018-08-03 PROCEDURE — 84305 ASSAY OF SOMATOMEDIN: CPT

## 2018-08-03 PROCEDURE — 82024 ASSAY OF ACTH: CPT

## 2018-08-03 PROCEDURE — 99999 PR PBB SHADOW E&M-EST. PATIENT-LVL II: CPT | Mod: PBBFAC,,,

## 2018-08-03 PROCEDURE — 83001 ASSAY OF GONADOTROPIN (FSH): CPT

## 2018-08-03 PROCEDURE — 84439 ASSAY OF FREE THYROXINE: CPT

## 2018-08-03 PROCEDURE — 36415 COLL VENOUS BLD VENIPUNCTURE: CPT

## 2018-08-03 PROCEDURE — 84443 ASSAY THYROID STIM HORMONE: CPT

## 2018-08-03 PROCEDURE — 84403 ASSAY OF TOTAL TESTOSTERONE: CPT

## 2018-08-03 PROCEDURE — 82533 TOTAL CORTISOL: CPT | Mod: 91

## 2018-08-03 NOTE — PROGRESS NOTES
0800-Patient arrived to clinic.Patient AAOx3.in procedure room obtain pt vital sings /80 P-72 R-17  0828-Started IV saline lock in right AC #22 jelco secured ,blood return and  NS flushed without any problem      0857-Baseline blood draw per  orders brought down stat too lab  0916-blood drawn via butterfly needle per  new additional orders sent to lab  Ns flush between blood draw    0925-cortrosyn 0.25mg IVP by Essence Hidalgo RN per  orders without difficulty    0955-post 30min blood drawn brought to lab patient stated he is doing well    1024-post 60 min blood drawn brought to lab     1030-per  IV saline lock discontinued #22 jelco intact,pressure dressing and coban applied no complaints verbalized patient discharged

## 2018-08-06 VITALS
BODY MASS INDEX: 19.91 KG/M2 | HEART RATE: 72 BPM | WEIGHT: 142.19 LBS | SYSTOLIC BLOOD PRESSURE: 110 MMHG | DIASTOLIC BLOOD PRESSURE: 80 MMHG | HEIGHT: 71 IN

## 2018-08-06 LAB
IGF-I SERPL-MCNC: 241 NG/ML (ref 91–442)
IGF-I Z-SCORE SERPL: 0.13 SD

## 2018-08-07 LAB — ACTH PLAS-MCNC: <5 PG/ML

## 2018-08-21 ENCOUNTER — PATIENT MESSAGE (OUTPATIENT)
Dept: ENDOCRINOLOGY | Facility: CLINIC | Age: 18
End: 2018-08-21

## 2018-08-21 DIAGNOSIS — R79.89 LOW SERUM ADRENOCORTICOTROPHIC HORMONE (ACTH): ICD-10-CM

## 2018-08-21 DIAGNOSIS — E27.49 CORTISOL DEFICIENCY: Primary | ICD-10-CM

## 2018-08-22 ENCOUNTER — PATIENT MESSAGE (OUTPATIENT)
Dept: ENDOCRINOLOGY | Facility: CLINIC | Age: 18
End: 2018-08-22

## 2018-09-04 ENCOUNTER — TELEPHONE (OUTPATIENT)
Dept: GENETICS | Facility: CLINIC | Age: 18
End: 2018-09-04

## 2018-09-05 ENCOUNTER — HOSPITAL ENCOUNTER (OUTPATIENT)
Dept: RADIOLOGY | Facility: HOSPITAL | Age: 18
Discharge: HOME | End: 2018-09-05
Attending: INTERNAL MEDICINE
Payer: COMMERCIAL

## 2018-09-05 ENCOUNTER — PATIENT MESSAGE (OUTPATIENT)
Dept: GASTROENTEROLOGY | Facility: CLINIC | Age: 18
End: 2018-09-05

## 2018-09-05 DIAGNOSIS — E27.49 CORTISOL DEFICIENCY: ICD-10-CM

## 2018-09-05 DIAGNOSIS — R79.89 LOW SERUM ADRENOCORTICOTROPHIC HORMONE (ACTH): ICD-10-CM

## 2018-09-05 PROCEDURE — 25500020 PHARM REV CODE 255: Performed by: INTERNAL MEDICINE

## 2018-09-05 PROCEDURE — A9585 GADOBUTROL INJECTION: HCPCS | Performed by: INTERNAL MEDICINE

## 2018-09-05 PROCEDURE — 70553 MRI BRAIN STEM W/O & W/DYE: CPT | Mod: TC

## 2018-09-05 PROCEDURE — 70553 MRI BRAIN STEM W/O & W/DYE: CPT | Mod: 26,,, | Performed by: RADIOLOGY

## 2018-09-05 RX ORDER — GADOBUTROL 604.72 MG/ML
3.5 INJECTION INTRAVENOUS
Status: COMPLETED | OUTPATIENT
Start: 2018-09-05 | End: 2018-09-05

## 2018-09-05 RX ADMIN — GADOBUTROL 3.5 ML: 604.72 INJECTION INTRAVENOUS at 11:09

## 2018-09-05 NOTE — LETTER
September 6, 2018    Arian Farris  [address] [facility] [place] [address]             [facility] - Gastroenterology  [address]  [facility] [address]  Phone: [phone]  Fax: [phone] To whom it may concern:      Mr Farris is under my treatment for a condition called cyclic vomiting which  Causes him to have periods of nausea and vomiting without explanation which may require hospital stays for a week at a time. He may miss class periodically because of this.    He is under medication to help control this condition but there is no cure and it may persist.     If you have any questions or concerns, please don't hesitate to call.    Sincerely,        Paul Breaux MD

## 2018-09-06 ENCOUNTER — PATIENT MESSAGE (OUTPATIENT)
Dept: ENDOCRINOLOGY | Facility: CLINIC | Age: 18
End: 2018-09-06

## 2018-09-07 ENCOUNTER — PATIENT MESSAGE (OUTPATIENT)
Dept: GASTROENTEROLOGY | Facility: CLINIC | Age: 18
End: 2018-09-07

## 2019-01-07 ENCOUNTER — OFFICE VISIT (OUTPATIENT)
Dept: OTOLARYNGOLOGY | Facility: CLINIC | Age: 19
End: 2019-01-07
Payer: COMMERCIAL

## 2019-01-07 VITALS
SYSTOLIC BLOOD PRESSURE: 149 MMHG | HEART RATE: 76 BPM | WEIGHT: 151.44 LBS | BODY MASS INDEX: 21.2 KG/M2 | HEIGHT: 71 IN | DIASTOLIC BLOOD PRESSURE: 94 MMHG

## 2019-01-07 DIAGNOSIS — J31.0 CHRONIC RHINITIS: Primary | ICD-10-CM

## 2019-01-07 DIAGNOSIS — R04.0 RECURRENT EPISTAXIS: ICD-10-CM

## 2019-01-07 DIAGNOSIS — J34.2 DEVIATED NASAL SEPTUM: ICD-10-CM

## 2019-01-07 PROCEDURE — 99999 PR PBB SHADOW E&M-EST. PATIENT-LVL III: ICD-10-PCS | Mod: PBBFAC,,, | Performed by: OTOLARYNGOLOGY

## 2019-01-07 PROCEDURE — 99214 PR OFFICE/OUTPT VISIT, EST, LEVL IV, 30-39 MIN: ICD-10-PCS | Mod: 25,S$GLB,, | Performed by: OTOLARYNGOLOGY

## 2019-01-07 PROCEDURE — 31231 NASAL ENDOSCOPY DX: CPT | Mod: S$GLB,,, | Performed by: OTOLARYNGOLOGY

## 2019-01-07 PROCEDURE — 31231 PR NASAL ENDOSCOPY, DX: ICD-10-PCS | Mod: S$GLB,,, | Performed by: OTOLARYNGOLOGY

## 2019-01-07 PROCEDURE — 3008F PR BODY MASS INDEX (BMI) DOCUMENTED: ICD-10-PCS | Mod: CPTII,S$GLB,, | Performed by: OTOLARYNGOLOGY

## 2019-01-07 PROCEDURE — 99999 PR PBB SHADOW E&M-EST. PATIENT-LVL III: CPT | Mod: PBBFAC,,, | Performed by: OTOLARYNGOLOGY

## 2019-01-07 PROCEDURE — 30903 PR CTRL NOSEBLEED,ANTER,COMPLEX: ICD-10-PCS | Mod: 59,LT,S$GLB, | Performed by: OTOLARYNGOLOGY

## 2019-01-07 PROCEDURE — 99214 OFFICE O/P EST MOD 30 MIN: CPT | Mod: 25,S$GLB,, | Performed by: OTOLARYNGOLOGY

## 2019-01-07 PROCEDURE — 30903 CONTROL OF NOSEBLEED: CPT | Mod: 59,LT,S$GLB, | Performed by: OTOLARYNGOLOGY

## 2019-01-07 PROCEDURE — 3008F BODY MASS INDEX DOCD: CPT | Mod: CPTII,S$GLB,, | Performed by: OTOLARYNGOLOGY

## 2019-01-07 NOTE — PROGRESS NOTES
Subjective:     Arian Farris is a 18 y.o. male who was self-referred for nosebleeds.    He was in his usual state of health until 3 weeks ago when he had the acute onset of left-sided nosebleed, which recurred on a daily basis and has been at times severe, lasting up to 1 hour before resolving with pressure.  He denies an inciting event, and denies nasal blockage, facial pain or numbness.  He had similar episodes over 1 year ago, and had a negative ENT evaluation at that time, and the episodes resolved after using saline spray.  He describes mild pruritic symptoms and aural fullness, which are attributed to allergies, though has not used any consistent medication.  He has not previously had nasal cauterization.  The bleeding has not required packing for control.  The last episode was 1 day ago, and is not currently active.  There is not a prior history of nasal surgery.  There is not a prior history of nasal trauma.  There is not a history of environmental allergies.  He does not currently use a nasal spray other than saline spray.  There is not a family history to suggest a clotting disorder.  He does not currently take a blood-thinning agent.      Past Medical History  He has a past medical history of Abdominal migraine, Cyclical vomiting with nausea, Eczema, H/O multiple concussions, and Migraines.    Past Surgical History  He has a past surgical history that includes Adenoidectomy; Tonsillectomy; and Cholecystectomy (01/2018).    Family History  His family history includes Diabetes in his maternal grandfather, maternal grandmother, paternal grandfather, and paternal grandmother; Heart disease in his mother and paternal grandfather; Hyperlipidemia in his paternal grandfather; Hypertension in his maternal grandfather, maternal grandmother, paternal grandfather, and paternal grandmother; Migraines in his maternal grandmother, mother, and sister; Other in his mother.    Social History  He reports that he is  "a non-smoker but has been exposed to tobacco smoke. he has never used smokeless tobacco. He reports that he drinks about 3.0 oz of alcohol per week. He reports that he does not use drugs.    Allergies  He has No Known Allergies.    Medications  He has a current medication list which includes the following prescription(s): promethazine, and the following Facility-Administered Medications: cosyntropin.    Review of Systems   Constitutional: Negative for fatigue, fever and unexpected weight change.   HENT: Positive for nosebleeds and sore throat. Negative for congestion, dental problem, ear discharge, ear pain, facial swelling, hearing loss, hoarse voice, postnasal drip, rhinorrhea, sinus pressure, tinnitus, trouble swallowing and voice change.    Eyes: Negative for photophobia, discharge, itching and visual disturbance.   Respiratory: Negative for apnea, cough, shortness of breath and wheezing.    Cardiovascular: Negative for chest pain and palpitations.   Gastrointestinal: Negative for abdominal pain, nausea and vomiting.   Endocrine: Negative for cold intolerance and heat intolerance.   Genitourinary: Negative for difficulty urinating.   Musculoskeletal: Negative for arthralgias, back pain, myalgias and neck pain.   Skin: Negative for rash.   Allergic/Immunologic: Negative for environmental allergies and food allergies.   Neurological: Negative for dizziness, seizures, syncope, weakness and headaches.   Hematological: Negative for adenopathy. Does not bruise/bleed easily.   Psychiatric/Behavioral: Negative for decreased concentration, dysphoric mood and sleep disturbance. The patient is not nervous/anxious.           Objective:     BP (!) 149/94   Pulse 76   Ht 5' 11" (1.803 m)   Wt 68.7 kg (151 lb 7.3 oz)   BMI 21.12 kg/m²      Constitutional:   He appears well-developed. He is cooperative. Normal speech.  No hoarse voice.      Head:  Normocephalic. Salivary glands normal.  Facial strength is normal.  "     Ears:    Right Ear: No drainage or tenderness. Tympanic membrane is not perforated. Tympanic membrane mobility is normal. No middle ear effusion. No decreased hearing is noted.   Left Ear: No drainage or tenderness. Tympanic membrane is not perforated. Tympanic membrane mobility is normal.  No middle ear effusion. No decreased hearing is noted.     Nose:  Mucosal edema present. No rhinorrhea, septal deviation or polyps. No epistaxis. Turbinates normal, no turbinate masses and no turbinate hypertrophy.  Right sinus exhibits no maxillary sinus tenderness and no frontal sinus tenderness. Left sinus exhibits no maxillary sinus tenderness and no frontal sinus tenderness.     Mouth/Throat  Oropharynx clear and moist without lesions or asymmetry and normal uvula midline. He does not have dentures. Normal dentition. No oral lesions or mucous membrane lesions. No oropharyngeal exudate or posterior oropharyngeal erythema. Mirror exam not performed due to patient tolerance.  Mirror exam not performed due to patient tolerance.      Neck:  Neck normal without thyromegaly masses, asymmetry, normal tracheal structure, crepitus, and tenderness, thyroid normal, trachea normal and no adenopathy. Normal range of motion present.     He has no cervical adenopathy.     Cardiovascular:   Regular rhythm.      Pulmonary/Chest:   Effort normal.     Psychiatric:   He has a normal mood and affect. His speech is normal and behavior is normal.     Neurological:   No cranial nerve deficit.     Skin:   No rash noted.       Procedure    Nasal endoscopy performed.  See procedure note.      Control of Epistaxis    Indication:  Epistaxis    Informed consent was obtained prior to proceeding.  The left nasal cavity was anesthetized with topical 4% lidocaine.  Bleeding was localized to the left nasal cavity at the medial nasal sill and cauterized with electrocautery with bipolar forceps.       The patient tolerated the procedure well.      Data  Reviewed    Hemoglobin (g/dL)   Date Value   03/16/2018 16.5     Hematocrit (%)   Date Value   03/16/2018 45.9     Platelets (K/uL)   Date Value   03/16/2018 201     No results found for: LABPROT  No results found for: APTT  No results found for: INR         Assessment:     1. Chronic rhinitis    2. Recurrent epistaxis    3. Deviated nasal septum         Plan:     I had a long discussion with the patient regarding his condition and the further workup and management options.  Nasal endoscopy was performed to address his complaint of specifically posterior drainage of the epistaxis.  However, after a negative endoscopy, an occult focus of arterial bleeding was encountered at the anterior nasal septum at the left nasal sill, which required electrocautery to control.  This was successful, with an associated transient vasovagal episode.  I prescribed the daily use of nasal saline drops to prevent mucosal dessiccation.    Follow-up if symptoms worsen or fail to improve.

## 2019-01-07 NOTE — PROCEDURES
Nasal/sinus endoscopy  Date/Time: 1/7/2019 2:52 PM  Performed by: Balaji Oliva MD  Authorized by: Balaji Oliva MD     Consent Done?:  Yes (Verbal)  Anesthesia:     Local anesthetic:  Lidocaine 4% and Trent-Synephrine 1/2%    Patient tolerance:  Patient tolerated the procedure well with no immediate complications  Nose:     Procedure Performed:  Nasal Endoscopy  External:      No external nasal deformity  Intranasal:      Mucosa no polyps     Mucosa ulcers not present     No mucosa lesions present     Turbinates not enlarged     Septum gross deformity  Nasopharynx:      No mucosa lesions     Adenoids not present     Posterior choanae patent     Eustachian tube patent     Leftward septal deviation and ridge.  No posterior nasal cavity lesions or abnormalities.

## 2019-07-26 ENCOUNTER — OFFICE VISIT (OUTPATIENT)
Dept: GASTROENTEROLOGY | Facility: CLINIC | Age: 19
End: 2019-07-26
Payer: COMMERCIAL

## 2019-07-26 VITALS
BODY MASS INDEX: 20.83 KG/M2 | DIASTOLIC BLOOD PRESSURE: 84 MMHG | HEART RATE: 90 BPM | SYSTOLIC BLOOD PRESSURE: 135 MMHG | WEIGHT: 148.81 LBS | HEIGHT: 71 IN

## 2019-07-26 DIAGNOSIS — R11.15 NON-INTRACTABLE CYCLICAL VOMITING WITH NAUSEA: Primary | ICD-10-CM

## 2019-07-26 PROCEDURE — 99999 PR PBB SHADOW E&M-EST. PATIENT-LVL III: CPT | Mod: PBBFAC,,, | Performed by: INTERNAL MEDICINE

## 2019-07-26 PROCEDURE — 99999 PR PBB SHADOW E&M-EST. PATIENT-LVL III: ICD-10-PCS | Mod: PBBFAC,,, | Performed by: INTERNAL MEDICINE

## 2019-07-26 PROCEDURE — 99214 PR OFFICE/OUTPT VISIT, EST, LEVL IV, 30-39 MIN: ICD-10-PCS | Mod: S$GLB,,, | Performed by: INTERNAL MEDICINE

## 2019-07-26 PROCEDURE — 3008F PR BODY MASS INDEX (BMI) DOCUMENTED: ICD-10-PCS | Mod: CPTII,S$GLB,, | Performed by: INTERNAL MEDICINE

## 2019-07-26 PROCEDURE — 3008F BODY MASS INDEX DOCD: CPT | Mod: CPTII,S$GLB,, | Performed by: INTERNAL MEDICINE

## 2019-07-26 PROCEDURE — 99214 OFFICE O/P EST MOD 30 MIN: CPT | Mod: S$GLB,,, | Performed by: INTERNAL MEDICINE

## 2019-07-26 RX ORDER — PROMETHAZINE HYDROCHLORIDE 12.5 MG/1
12.5 TABLET ORAL 3 TIMES DAILY PRN
Qty: 30 TABLET | Refills: 3 | Status: SHIPPED | OUTPATIENT
Start: 2019-07-26 | End: 2019-11-25

## 2019-07-26 NOTE — PROGRESS NOTES
[facility] Consultation Note    Reason for Consult:  The encounter diagnosis was Non-intractable cyclical vomiting with nausea.    PCP:   SUSAN Jo       Referring MD:  No referring provider defined for this encounter.    HPI:  This is a 19 y.o. male here for evaluation of cyclic vomiting.  Please see the consult note from recent hospital stay for full description of his history.  He was diagnosed with abdominal migraines as a child, had EGD and colon, and did fine for 11 years. Then recently started having recurrent attacks of nausea and vomiting.  HIDA with EF of 40% and had GB removed but no improvement, recent EGD was unremarkable.  States that he is fine in between attacks, denies THC. Most recent attack triggered at [place]l    Abdominal pain - no  Reflux - no  Dysphagia - no   Bowel habits - normal  GI bleeding - none  NSAID usage - occasional excedrin for migraines, but pretty rare    Interval history 07/26/2019:    Was in [place] and [place] and  for flareups. Around March/April/June. No hospitalizations.  He was off his elavil because he was feeling good for 9 months    ROS:  Constitutional: No fevers, chills, No weight loss  ENT: + seasonal allergies, + sinus infection, + epistaxis cauterized this January  CV: No chest pain  Pulm: No cough, No shortness of breath  Ophtho: No vision changes  GI: see HPI  Derm: No rash  Heme: No lymphadenopathy, No bruising  MSK: No arthritis  : No dysuria, No hematuria  Endo: No hot or cold intolerance  Neuro: + migraines  Psych: No anxiety, No depression    Medical History:  has a past medical history of Abdominal migraine, Cyclical vomiting with nausea, Eczema, H/O multiple concussions, and Migraines.    Surgical History:  has a past surgical history that includes Adenoidectomy; Tonsillectomy; and Cholecystectomy (01/2018).      Review of patient's allergies indicates:  No Known Allergies    Current Outpatient Medications   Medication Sig  "Dispense Refill    promethazine (PHENERGAN) 12.5 MG Tab Take 1 tablet (12.5 mg total) by mouth 3 (three) times daily as needed. 30 tablet 3     Current Facility-Administered Medications   Medication Dose Route Frequency Provider Last Rate Last Dose    cosyntropin injection 0.25 mg  0.25 mg Intravenous Once Minor DEVAN Disla MD               Objective Findings:    Vital Signs:  /84   Pulse 90   Ht 5' 11" (1.803 m)   Wt 67.5 kg (148 lb 13 oz)   BMI 20.75 kg/m²   Body mass index is 20.75 kg/m².    Physical Exam:  General Appearance: Well appearing in no acute distress  Head:   Normocephalic, without obvious abnormality  Eyes:    No scleral icterus, EOMI  ENT: Neck supple, Lips, mucosa, and tongue normal; teeth and gums normal  Lungs: CTA bilaterally in anterior and posterior fields, no wheezes, no crackles.  Heart:  Regular rate and rhythm, S1, S2 normal, no murmurs heard  Abdomen: Soft, non tender, non distended with positive bowel sounds in all four quadrants. No hepatosplenomegaly, ascites, or mass  Extremities: 2+ pulses, no clubbing, cyanosis or edema  Skin: No rash  Neurologic: CN II-XII intact      Labs:  Lab Results   Component Value Date    WBC 9.99 03/16/2018    HGB 16.5 03/16/2018    HCT 45.9 03/16/2018     03/16/2018    CHOL 161 07/08/2015    TRIG 120 07/08/2015    HDL 53 07/08/2015    ALT 39 03/16/2018    AST 40 03/16/2018     06/04/2018    K 4.1 06/04/2018     06/04/2018    CREATININE 1.0 06/04/2018    BUN 14 06/04/2018    CO2 26 06/04/2018    TSH 1.381 08/03/2018       No components found for: H. PYLORI IGG INTERP     24hr 5HIAA - wnl  Urine pbg - wnl  HAE markers - wnl    Imaging:  NM GES - wnl    Endoscopy:    EGD - 2017 wnl      Assessment:  1. Non-intractable cyclical vomiting with nausea      Cyclic vomiting.     He doesn't like taking the elavil all the time and self stopped it. It seems that his episodes are always in the spring and related to sporting events " [facility]     Recommendations:  1. Continue elavil 50 mg first half of the year when he typically has [facility] flareups  2. Contact us phenergan refilled for prn    Follow up in about 1 year (around 7/26/2020).      Order summary:  Orders Placed This Encounter    promethazine (PHENERGAN) 12.5 MG Tab         Thank you so much for allowing me to participate in the care of Arian Breaux MD

## 2019-08-23 ENCOUNTER — OFFICE VISIT (OUTPATIENT)
Dept: OPHTHALMOLOGY | Facility: CLINIC | Age: 19
End: 2019-08-23
Payer: COMMERCIAL

## 2019-08-23 DIAGNOSIS — H52.13 MYOPIA, BILATERAL: ICD-10-CM

## 2019-08-23 DIAGNOSIS — H53.8 BLURRED VISION, BILATERAL: Primary | ICD-10-CM

## 2019-08-23 DIAGNOSIS — H52.221 REGULAR ASTIGMATISM OF RIGHT EYE: ICD-10-CM

## 2019-08-23 PROCEDURE — 92310 PR CONTACT LENS FITTING (NO CHANGE): ICD-10-PCS | Mod: CSM,,, | Performed by: OPTOMETRIST

## 2019-08-23 PROCEDURE — 92004 PR EYE EXAM, NEW PATIENT,COMPREHESV: ICD-10-PCS | Mod: S$GLB,,, | Performed by: OPTOMETRIST

## 2019-08-23 PROCEDURE — 92015 PR REFRACTION: ICD-10-PCS | Mod: S$GLB,,, | Performed by: OPTOMETRIST

## 2019-08-23 PROCEDURE — 92004 COMPRE OPH EXAM NEW PT 1/>: CPT | Mod: S$GLB,,, | Performed by: OPTOMETRIST

## 2019-08-23 PROCEDURE — 92310 CONTACT LENS FITTING OU: CPT | Mod: CSM,,, | Performed by: OPTOMETRIST

## 2019-08-23 PROCEDURE — 99999 PR PBB SHADOW E&M-EST. PATIENT-LVL II: ICD-10-PCS | Mod: PBBFAC,,, | Performed by: OPTOMETRIST

## 2019-08-23 PROCEDURE — 99999 PR PBB SHADOW E&M-EST. PATIENT-LVL II: CPT | Mod: PBBFAC,,, | Performed by: OPTOMETRIST

## 2019-08-23 PROCEDURE — 92015 DETERMINE REFRACTIVE STATE: CPT | Mod: S$GLB,,, | Performed by: OPTOMETRIST

## 2019-08-23 NOTE — PROGRESS NOTES
HPI     Eye Exam      Additional comments: Yearly              Comments     New Patient   Last Eye Exam 2018  HPI    Any vision changes since last exam: No  Eye pain: None  Other ocular symptoms: None    Do you wear currently wear glasses or contacts? Both    Interested in contacts today? Yes    Do you plan on getting new glasses today? Yes          Last edited by Yeimi Gordillo on 8/23/2019  1:23 PM. (History)            Assessment /Plan     For exam results, see Encounter Report.    Blurred vision, bilateral  Myopia, bilateral  Regular astigmatism of right eye    Eyeglass Final Rx     Eyeglass Final Rx       Sphere Cylinder Axis    Right -2.75 +0.50 090    Left -2.75      Expiration Date:  8/23/2020              Contact Lens Prescription (8/23/2019)        Brand Base Curve Diameter Sphere    Right Air Optix HydraGlyde 8.6 14.2 -3.00    Left Air Optix HydraGlyde 8.6 14.2 -3.00    Expiration Date:  8/23/2020    Replacement:  Monthly    Wearing Schedule:  Daily wear      Dispensed trial contact lenses today. Patient is to wear lenses for 1 week. Ok to order supply if no problems. RTC PRN if any problems arise.      RTC 1 yr for dilated eye exam or PRN if any problems.   Discussed above and answered questions.

## 2019-11-25 ENCOUNTER — LAB VISIT (OUTPATIENT)
Dept: LAB | Facility: HOSPITAL | Age: 19
End: 2019-11-25
Attending: FAMILY MEDICINE
Payer: COMMERCIAL

## 2019-11-25 ENCOUNTER — OFFICE VISIT (OUTPATIENT)
Dept: INTERNAL MEDICINE | Facility: CLINIC | Age: 19
End: 2019-11-25
Payer: COMMERCIAL

## 2019-11-25 VITALS
WEIGHT: 153.69 LBS | BODY MASS INDEX: 21.52 KG/M2 | SYSTOLIC BLOOD PRESSURE: 124 MMHG | OXYGEN SATURATION: 95 % | TEMPERATURE: 98 F | DIASTOLIC BLOOD PRESSURE: 80 MMHG | HEART RATE: 110 BPM | HEIGHT: 71 IN

## 2019-11-25 DIAGNOSIS — Z13.220 SCREENING FOR LIPID DISORDERS: ICD-10-CM

## 2019-11-25 DIAGNOSIS — R74.8 ELEVATED ALKALINE PHOSPHATASE LEVEL: ICD-10-CM

## 2019-11-25 DIAGNOSIS — R73.09 ELEVATED GLUCOSE: ICD-10-CM

## 2019-11-25 DIAGNOSIS — Z23 NEED FOR INFLUENZA VACCINATION: ICD-10-CM

## 2019-11-25 DIAGNOSIS — E80.6 HYPERBILIRUBINEMIA: Chronic | ICD-10-CM

## 2019-11-25 DIAGNOSIS — Z00.00 PREVENTATIVE HEALTH CARE: Primary | ICD-10-CM

## 2019-11-25 DIAGNOSIS — Z23 NEED FOR HPV VACCINATION: ICD-10-CM

## 2019-11-25 DIAGNOSIS — Z00.00 PREVENTATIVE HEALTH CARE: ICD-10-CM

## 2019-11-25 PROBLEM — M25.512 LEFT SHOULDER PAIN: Status: RESOLVED | Noted: 2017-06-29 | Resolved: 2019-11-25

## 2019-11-25 PROCEDURE — 90472 IMMUNIZATION ADMIN EACH ADD: CPT | Mod: S$GLB,,, | Performed by: FAMILY MEDICINE

## 2019-11-25 PROCEDURE — 99999 PR PBB SHADOW E&M-EST. PATIENT-LVL III: ICD-10-PCS | Mod: PBBFAC,,, | Performed by: FAMILY MEDICINE

## 2019-11-25 PROCEDURE — 99395 PR PREVENTIVE VISIT,EST,18-39: ICD-10-PCS | Mod: 25,S$GLB,, | Performed by: FAMILY MEDICINE

## 2019-11-25 PROCEDURE — 82977 ASSAY OF GGT: CPT

## 2019-11-25 PROCEDURE — 90686 IIV4 VACC NO PRSV 0.5 ML IM: CPT | Mod: S$GLB,,, | Performed by: FAMILY MEDICINE

## 2019-11-25 PROCEDURE — 90472 HPV VACCINE 9-VALENT 3 DOSE IM: ICD-10-PCS | Mod: S$GLB,,, | Performed by: FAMILY MEDICINE

## 2019-11-25 PROCEDURE — 90651 HPV VACCINE 9-VALENT 3 DOSE IM: ICD-10-PCS | Mod: S$GLB,,, | Performed by: FAMILY MEDICINE

## 2019-11-25 PROCEDURE — 80076 HEPATIC FUNCTION PANEL: CPT

## 2019-11-25 PROCEDURE — 90471 IMMUNIZATION ADMIN: CPT | Mod: S$GLB,,, | Performed by: FAMILY MEDICINE

## 2019-11-25 PROCEDURE — 80061 LIPID PANEL: CPT

## 2019-11-25 PROCEDURE — 90651 9VHPV VACCINE 2/3 DOSE IM: CPT | Mod: S$GLB,,, | Performed by: FAMILY MEDICINE

## 2019-11-25 PROCEDURE — 90471 FLU VACCINE (QUAD) GREATER THAN OR EQUAL TO 3YO PRESERVATIVE FREE IM: ICD-10-PCS | Mod: S$GLB,,, | Performed by: FAMILY MEDICINE

## 2019-11-25 PROCEDURE — 90686 FLU VACCINE (QUAD) GREATER THAN OR EQUAL TO 3YO PRESERVATIVE FREE IM: ICD-10-PCS | Mod: S$GLB,,, | Performed by: FAMILY MEDICINE

## 2019-11-25 PROCEDURE — 99999 PR PBB SHADOW E&M-EST. PATIENT-LVL III: CPT | Mod: PBBFAC,,, | Performed by: FAMILY MEDICINE

## 2019-11-25 PROCEDURE — 36415 COLL VENOUS BLD VENIPUNCTURE: CPT

## 2019-11-25 PROCEDURE — 80048 BASIC METABOLIC PNL TOTAL CA: CPT

## 2019-11-25 PROCEDURE — 99395 PREV VISIT EST AGE 18-39: CPT | Mod: 25,S$GLB,, | Performed by: FAMILY MEDICINE

## 2019-11-26 LAB
ALBUMIN SERPL BCP-MCNC: 4.5 G/DL (ref 3.5–5.2)
ALP SERPL-CCNC: 106 U/L (ref 55–135)
ALT SERPL W/O P-5'-P-CCNC: 13 U/L (ref 10–44)
ANION GAP SERPL CALC-SCNC: 6 MMOL/L (ref 8–16)
AST SERPL-CCNC: 22 U/L (ref 10–40)
BILIRUB DIRECT SERPL-MCNC: 0.4 MG/DL (ref 0.1–0.3)
BILIRUB SERPL-MCNC: 0.9 MG/DL (ref 0.1–1)
BUN SERPL-MCNC: 16 MG/DL (ref 6–20)
CALCIUM SERPL-MCNC: 9.8 MG/DL (ref 8.7–10.5)
CHLORIDE SERPL-SCNC: 104 MMOL/L (ref 95–110)
CHOLEST SERPL-MCNC: 163 MG/DL (ref 120–199)
CHOLEST/HDLC SERPL: 2.7 {RATIO} (ref 2–5)
CO2 SERPL-SCNC: 28 MMOL/L (ref 23–29)
CREAT SERPL-MCNC: 1.2 MG/DL (ref 0.5–1.4)
EST. GFR  (AFRICAN AMERICAN): >60 ML/MIN/1.73 M^2
EST. GFR  (NON AFRICAN AMERICAN): >60 ML/MIN/1.73 M^2
GGT SERPL-CCNC: 17 U/L (ref 8–55)
GLUCOSE SERPL-MCNC: 53 MG/DL (ref 70–110)
HDLC SERPL-MCNC: 61 MG/DL (ref 40–75)
HDLC SERPL: 37.4 % (ref 20–50)
LDLC SERPL CALC-MCNC: 79.4 MG/DL (ref 63–159)
NONHDLC SERPL-MCNC: 102 MG/DL
POTASSIUM SERPL-SCNC: 4.4 MMOL/L (ref 3.5–5.1)
PROT SERPL-MCNC: 7 G/DL (ref 6–8.4)
SODIUM SERPL-SCNC: 138 MMOL/L (ref 136–145)
TRIGL SERPL-MCNC: 113 MG/DL (ref 30–150)

## 2019-12-11 ENCOUNTER — PATIENT MESSAGE (OUTPATIENT)
Dept: GASTROENTEROLOGY | Facility: CLINIC | Age: 19
End: 2019-12-11

## 2019-12-11 RX ORDER — AMITRIPTYLINE HYDROCHLORIDE 50 MG/1
TABLET, FILM COATED ORAL
Qty: 30 TABLET | Refills: 5 | Status: SHIPPED | OUTPATIENT
Start: 2019-12-11 | End: 2021-03-12

## 2019-12-11 RX ORDER — ONDANSETRON 4 MG/1
4 TABLET, ORALLY DISINTEGRATING ORAL EVERY 8 HOURS PRN
Qty: 20 TABLET | Refills: 3 | Status: SHIPPED | OUTPATIENT
Start: 2019-12-11 | End: 2020-11-20 | Stop reason: SDUPTHER

## 2019-12-11 RX ORDER — PROMETHAZINE HYDROCHLORIDE 12.5 MG/1
12.5 TABLET ORAL EVERY 6 HOURS PRN
Qty: 30 TABLET | Refills: 3 | Status: SHIPPED | OUTPATIENT
Start: 2019-12-11 | End: 2020-11-20

## 2020-01-03 ENCOUNTER — OFFICE VISIT (OUTPATIENT)
Dept: INTERNAL MEDICINE | Facility: CLINIC | Age: 20
End: 2020-01-03
Payer: COMMERCIAL

## 2020-01-03 VITALS
WEIGHT: 153 LBS | RESPIRATION RATE: 16 BRPM | HEART RATE: 100 BPM | HEIGHT: 71 IN | SYSTOLIC BLOOD PRESSURE: 132 MMHG | TEMPERATURE: 99 F | DIASTOLIC BLOOD PRESSURE: 78 MMHG | BODY MASS INDEX: 21.42 KG/M2

## 2020-01-03 DIAGNOSIS — B96.89 BACTERIAL SINUSITIS: Primary | ICD-10-CM

## 2020-01-03 DIAGNOSIS — J32.9 BACTERIAL SINUSITIS: Primary | ICD-10-CM

## 2020-01-03 PROCEDURE — 99999 PR PBB SHADOW E&M-EST. PATIENT-LVL III: ICD-10-PCS | Mod: PBBFAC,,, | Performed by: FAMILY MEDICINE

## 2020-01-03 PROCEDURE — 3008F PR BODY MASS INDEX (BMI) DOCUMENTED: ICD-10-PCS | Mod: CPTII,S$GLB,, | Performed by: FAMILY MEDICINE

## 2020-01-03 PROCEDURE — 99214 PR OFFICE/OUTPT VISIT, EST, LEVL IV, 30-39 MIN: ICD-10-PCS | Mod: S$GLB,,, | Performed by: FAMILY MEDICINE

## 2020-01-03 PROCEDURE — 3008F BODY MASS INDEX DOCD: CPT | Mod: CPTII,S$GLB,, | Performed by: FAMILY MEDICINE

## 2020-01-03 PROCEDURE — 99999 PR PBB SHADOW E&M-EST. PATIENT-LVL III: CPT | Mod: PBBFAC,,, | Performed by: FAMILY MEDICINE

## 2020-01-03 PROCEDURE — 99214 OFFICE O/P EST MOD 30 MIN: CPT | Mod: S$GLB,,, | Performed by: FAMILY MEDICINE

## 2020-01-03 RX ORDER — AMOXICILLIN AND CLAVULANATE POTASSIUM 875; 125 MG/1; MG/1
1 TABLET, FILM COATED ORAL 2 TIMES DAILY
Qty: 20 TABLET | Refills: 0 | Status: SHIPPED | OUTPATIENT
Start: 2020-01-03 | End: 2020-01-13

## 2020-01-03 RX ORDER — BENZONATATE 200 MG/1
200 CAPSULE ORAL 3 TIMES DAILY PRN
Qty: 30 CAPSULE | Refills: 0 | Status: SHIPPED | OUTPATIENT
Start: 2020-01-03 | End: 2020-01-13

## 2020-01-03 NOTE — PROGRESS NOTES
Subjective:       Patient ID: Airan Farris is a 19 y.o. male.    Chief Complaint: Sinus Problem and Sore Throat    HPI 19-year-old white male presents to clinic today secondary to a complaint of worsening nasal congestion, ear pressure, postnasal drip, runny nose, sinus pressure, sore throat, chest congestion, and cough productive of yellowish sputum worsening since Christmas.  He also reports similar symptoms upon returning from a trip to [place] in early December.  He had taking Claritin with improvement of his symptoms; however, the symptoms returned post Nayla and have not been improved with use of Claritin.  Review of Systems   Constitutional: Negative for appetite change, chills, fatigue and fever.   HENT: Positive for congestion, ear pain, postnasal drip, rhinorrhea, sinus pressure and sore throat. Negative for hearing loss and tinnitus.    Eyes: Negative for redness, itching and visual disturbance.   Respiratory: Positive for cough (Yellowish sputum) and chest tightness. Negative for shortness of breath.    Cardiovascular: Negative for chest pain and palpitations.   Gastrointestinal: Negative for abdominal pain, constipation, diarrhea, nausea and vomiting.   Genitourinary: Negative for decreased urine volume, difficulty urinating, dysuria, frequency, hematuria and urgency.   Musculoskeletal: Negative for back pain, myalgias, neck pain and neck stiffness.   Skin: Negative for rash.   Neurological: Negative for dizziness, light-headedness and headaches.   Psychiatric/Behavioral: Negative.        Objective:      Physical Exam   Constitutional: He is oriented to person, place, and time. He appears well-developed and well-nourished. No distress.   HENT:   Head: Normocephalic and atraumatic.   Right Ear: External ear normal. A middle ear effusion is present.   Left Ear: External ear normal. A middle ear effusion is present.   Nose: Mucosal edema and rhinorrhea present. No nose lacerations, sinus  tenderness, nasal deformity, septal deviation or nasal septal hematoma. No epistaxis.  No foreign bodies. Right sinus exhibits maxillary sinus tenderness and frontal sinus tenderness. Left sinus exhibits maxillary sinus tenderness and frontal sinus tenderness.   Mouth/Throat: Oropharynx is clear and moist. No oropharyngeal exudate.   Eyes: Pupils are equal, round, and reactive to light. Conjunctivae and EOM are normal. Right eye exhibits no discharge. Left eye exhibits no discharge. No scleral icterus.   Neck: Normal range of motion. Neck supple. No JVD present. No tracheal deviation present. No thyromegaly present.   Cardiovascular: Normal rate, regular rhythm, normal heart sounds and intact distal pulses. Exam reveals no gallop and no friction rub.   No murmur heard.  Pulmonary/Chest: Effort normal and breath sounds normal. No stridor. No respiratory distress. He has no wheezes. He has no rales.   Abdominal: Soft. Bowel sounds are normal. He exhibits no distension and no mass. There is no tenderness. There is no rebound and no guarding.   Musculoskeletal: Normal range of motion. He exhibits no edema or tenderness.   Lymphadenopathy:     He has no cervical adenopathy.   Neurological: He is alert and oriented to person, place, and time.   Skin: Skin is warm and dry. No rash noted. He is not diaphoretic. No erythema. No pallor.   Psychiatric: He has a normal mood and affect. His behavior is normal. Judgment and thought content normal.   Nursing note and vitals reviewed.      Assessment:       1. Bacterial sinusitis        Plan:       Bacterial sinusitis  -     amoxicillin-clavulanate 875-125mg (AUGMENTIN) 875-125 mg per tablet; Take 1 tablet by mouth 2 (two) times daily. for 10 days  Dispense: 20 tablet; Refill: 0  -     benzonatate (TESSALON) 200 MG capsule; Take 1 capsule (200 mg total) by mouth 3 (three) times daily as needed for Cough.  Dispense: 30 capsule; Refill: 0      Flonase nasal spray 2 sprays per nostril  daily.  Tylenol and ibuprofen as needed for fever or pain.  Saltwater or Listerine gargle as needed for sore throat.  Continue over-the-counter Claritin nightly.  Return to clinic as needed if symptoms persist or worsen.

## 2020-02-21 ENCOUNTER — OFFICE VISIT (OUTPATIENT)
Dept: FAMILY MEDICINE | Facility: CLINIC | Age: 20
End: 2020-02-21
Payer: COMMERCIAL

## 2020-02-21 VITALS
WEIGHT: 155.44 LBS | HEIGHT: 71 IN | SYSTOLIC BLOOD PRESSURE: 129 MMHG | BODY MASS INDEX: 21.76 KG/M2 | DIASTOLIC BLOOD PRESSURE: 72 MMHG | OXYGEN SATURATION: 98 % | HEART RATE: 99 BPM | TEMPERATURE: 99 F

## 2020-02-21 DIAGNOSIS — J02.9 PHARYNGITIS, UNSPECIFIED ETIOLOGY: Primary | ICD-10-CM

## 2020-02-21 DIAGNOSIS — J02.9 SORE THROAT: ICD-10-CM

## 2020-02-21 PROCEDURE — 99999 PR PBB SHADOW E&M-EST. PATIENT-LVL III: ICD-10-PCS | Mod: PBBFAC,,, | Performed by: FAMILY MEDICINE

## 2020-02-21 PROCEDURE — 3008F PR BODY MASS INDEX (BMI) DOCUMENTED: ICD-10-PCS | Mod: CPTII,S$GLB,, | Performed by: FAMILY MEDICINE

## 2020-02-21 PROCEDURE — 99999 PR PBB SHADOW E&M-EST. PATIENT-LVL III: CPT | Mod: PBBFAC,,, | Performed by: FAMILY MEDICINE

## 2020-02-21 PROCEDURE — 87147 CULTURE TYPE IMMUNOLOGIC: CPT

## 2020-02-21 PROCEDURE — 3008F BODY MASS INDEX DOCD: CPT | Mod: CPTII,S$GLB,, | Performed by: FAMILY MEDICINE

## 2020-02-21 PROCEDURE — 99214 OFFICE O/P EST MOD 30 MIN: CPT | Mod: S$GLB,,, | Performed by: FAMILY MEDICINE

## 2020-02-21 PROCEDURE — 87070 CULTURE OTHR SPECIMN AEROBIC: CPT

## 2020-02-21 PROCEDURE — 99214 PR OFFICE/OUTPT VISIT, EST, LEVL IV, 30-39 MIN: ICD-10-PCS | Mod: S$GLB,,, | Performed by: FAMILY MEDICINE

## 2020-02-21 RX ORDER — AMOXICILLIN AND CLAVULANATE POTASSIUM 875; 125 MG/1; MG/1
1 TABLET, FILM COATED ORAL EVERY 12 HOURS
Qty: 20 TABLET | Refills: 0 | Status: SHIPPED | OUTPATIENT
Start: 2020-02-21 | End: 2020-03-02

## 2020-02-21 NOTE — PROGRESS NOTES
Subjective:      Patient ID: Arian Farris is a 19 y.o. male.    Chief Complaint: Sore Throat (pt states since entire week, hurts)    HPI    Throat pain   Started 5 days ago   Sore throat - difficult to swallow - hasn't improved since started   Using tylenol, gargling salt water  Went to [facility] and strep was negative   Feeling hotter than usual   Some sweating at night  No nausea, vomiting, diarrhea, chest pain, sob, nasal congestion  Hx of tonsillectomy   Notes that this happened in January and he was given abx and it went away     Past Medical History:   Diagnosis Date    Abdominal migraine     Cyclical vomiting with nausea     Eczema     H/O multiple concussions     Migraines        Past Surgical History:   Procedure Laterality Date    ADENOIDECTOMY      CHOLECYSTECTOMY  01/2018    TONSILLECTOMY         Family History   Problem Relation Age of Onset    Other Mother         Hypoglycemia    Heart disease Mother         Mitral valve prolapse    Migraines Mother     Migraines Sister     Hypertension Maternal Grandmother     Diabetes Maternal Grandmother     Migraines Maternal Grandmother     Hypertension Maternal Grandfather     Diabetes Maternal Grandfather     Hypertension Paternal Grandmother     Diabetes Paternal Grandmother     Hypertension Paternal Grandfather     Diabetes Paternal Grandfather     Heart disease Paternal Grandfather     Hyperlipidemia Paternal Grandfather     No Known Problems Father     Short stature Neg Hx     Thyroid disease Neg Hx     Colon cancer Neg Hx     Esophageal cancer Neg Hx        Social History     Socioeconomic History    Marital status: Single     Spouse name: Not on file    Number of children: Not on file    Years of education: Not on file    Highest education level: Not on file   Occupational History    Not on file   Social Needs    Financial resource strain: Not very hard    Food insecurity:     Worry: Never true      Inability: Never true    Transportation needs:     Medical: No     Non-medical: No   Tobacco Use    Smoking status: Passive Smoke Exposure - Never Smoker    Smokeless tobacco: Never Used    Tobacco comment: Dad smokes outside   Substance and Sexual Activity    Alcohol use: Yes     Alcohol/week: 12.0 standard drinks     Types: 12 Standard drinks or equivalent per week     Frequency: 2-3 times a week     Drinks per session: 3 or 4     Binge frequency: Monthly    Drug use: No    Sexual activity: Yes     Partners: Female     Birth control/protection: Condom   Lifestyle    Physical activity:     Days per week: 4 days     Minutes per session: 60 min    Stress: Only a little   Relationships    Social connections:     Talks on phone: More than three times a week     Gets together: Once a week     Attends Latter-day service: Not on file     Active member of club or organization: No     Attends meetings of clubs or organizations: Never     Relationship status: Never    Other Topics Concern    Not on file   Social History Narrative    In college at [employer] and will be a [employer], major - sports adminstration       The patient has no Health Maintenance topics of status Not Due    Medication List with Changes/Refills   New Medications    AMOXICILLIN-CLAVULANATE 875-125MG (AUGMENTIN) 875-125 MG PER TABLET    Take 1 tablet by mouth every 12 (twelve) hours. for 10 days   Current Medications    AMITRIPTYLINE (ELAVIL) 50 MG TABLET    Take 1/2 tab at night x 1 week then 1 tab every night    ONDANSETRON (ZOFRAN-ODT) 4 MG TBDL    Take 1 tablet (4 mg total) by mouth every 8 (eight) hours as needed.    PROMETHAZINE (PHENERGAN) 12.5 MG TAB    Take 1 tablet (12.5 mg total) by mouth every 6 (six) hours as needed.       Review of patient's allergies indicates:  No Known Allergies    Review of Systems   Constitutional: Positive for chills, fever and malaise/fatigue.   HENT: Positive for sore throat. Negative for congestion and ear  pain.    Eyes: Negative for blurred vision and pain.   Respiratory: Negative for cough, shortness of breath and stridor.    Cardiovascular: Negative for chest pain and leg swelling.   Gastrointestinal: Negative for abdominal pain, constipation, diarrhea and nausea.   Genitourinary: Negative for dysuria.   Musculoskeletal: Negative for myalgias.   Skin: Negative for rash.   Neurological: Negative for headaches.       Objective:     Vitals:    02/21/20 1411   BP: 129/72   Pulse: 99   Temp: 98.8 °F (37.1 °C)     Body mass index is 21.68 kg/m².    Physical Exam   Constitutional: He is oriented to person, place, and time. He appears well-developed and well-nourished. No distress.   HENT:   Head: Normocephalic and atraumatic.   Right Ear: External ear normal.   Left Ear: External ear normal.   Nose: Nose normal.   Mouth/Throat: Posterior oropharyngeal erythema present.   Eyes: Pupils are equal, round, and reactive to light. Conjunctivae and EOM are normal.   Neck: No thyromegaly present.   Cardiovascular: Normal rate, regular rhythm, normal heart sounds and intact distal pulses.   No murmur heard.  Pulmonary/Chest: Effort normal and breath sounds normal. No respiratory distress. He has no wheezes. He has no rales. He exhibits no tenderness.   Abdominal: Soft. Bowel sounds are normal. He exhibits no distension. There is no tenderness.   Musculoskeletal: He exhibits no edema.   Lymphadenopathy:     He has cervical adenopathy.   Neurological: He is alert and oriented to person, place, and time.   Skin: Skin is warm and dry.   Psychiatric: He has a normal mood and affect.   Nursing note and vitals reviewed.      Assessment and Plan:     Pharyngitis, unspecified etiology    Sore throat  -     POCT Rapid Strep A  -     Throat culture    Other orders  -     amoxicillin-clavulanate 875-125mg (AUGMENTIN) 875-125 mg per tablet; Take 1 tablet by mouth every 12 (twelve) hours. for 10 days  Dispense: 20 tablet; Refill: 0    Augmentin    Salt water gargles  Tylenol or ibuprofen as needed for pain  Throat culture     Follow up if symptoms worsen or fail to improve.    @@

## 2020-02-21 NOTE — LETTER
February 21, 2020    Arian Farris  [address] [facility] [place] [address]             [facility]  Family Medicine  [address] [address]  Phone: [phone]  Fax: [phone]   February 21, 2020     Patient: Arian Farris   YOB: 2000   Date of Visit: 2/21/2020       To Whom it May Concern:    Arian Farris was seen in my clinic on 2/21/2020. Please excuse him from 02/19/2020-02/21/2020. He may return to work on 02/22/2020.    Please excuse him from any classes or work missed.    If you have any questions or concerns, please don't hesitate to call.    Sincerely,         Sangeeta Valencia MD

## 2020-02-24 LAB — BACTERIA THROAT CULT: ABNORMAL

## 2020-04-13 ENCOUNTER — PATIENT MESSAGE (OUTPATIENT)
Dept: GASTROENTEROLOGY | Facility: CLINIC | Age: 20
End: 2020-04-13

## 2020-04-14 RX ORDER — MONTELUKAST SODIUM 4 MG/1
1 TABLET, CHEWABLE ORAL 2 TIMES DAILY
Qty: 60 TABLET | Refills: 3 | Status: SHIPPED | OUTPATIENT
Start: 2020-04-14 | End: 2022-09-01

## 2020-06-14 ENCOUNTER — CLINICAL SUPPORT (OUTPATIENT)
Dept: URGENT CARE | Facility: CLINIC | Age: 20
End: 2020-06-14
Payer: COMMERCIAL

## 2020-06-14 VITALS
RESPIRATION RATE: 18 BRPM | TEMPERATURE: 98 F | BODY MASS INDEX: 20.99 KG/M2 | HEIGHT: 72 IN | SYSTOLIC BLOOD PRESSURE: 136 MMHG | HEART RATE: 94 BPM | DIASTOLIC BLOOD PRESSURE: 89 MMHG | WEIGHT: 155 LBS | OXYGEN SATURATION: 97 %

## 2020-06-14 DIAGNOSIS — R11.2 NAUSEA AND VOMITING, INTRACTABILITY OF VOMITING NOT SPECIFIED, UNSPECIFIED VOMITING TYPE: ICD-10-CM

## 2020-06-14 DIAGNOSIS — R10.84 GENERALIZED ABDOMINAL PAIN: Primary | ICD-10-CM

## 2020-06-14 PROCEDURE — 99213 PR OFFICE/OUTPT VISIT, EST, LEVL III, 20-29 MIN: ICD-10-PCS | Mod: S$GLB,,, | Performed by: FAMILY MEDICINE

## 2020-06-14 PROCEDURE — 99213 OFFICE O/P EST LOW 20 MIN: CPT | Mod: S$GLB,,, | Performed by: FAMILY MEDICINE

## 2020-06-14 PROCEDURE — 74019 RADEX ABDOMEN 2 VIEWS: CPT | Mod: FY,S$GLB,, | Performed by: RADIOLOGY

## 2020-06-14 PROCEDURE — 74019 XR ABDOMEN FLAT AND ERECT: ICD-10-PCS | Mod: FY,S$GLB,, | Performed by: RADIOLOGY

## 2020-06-14 RX ORDER — ONDANSETRON 8 MG/1
8 TABLET, ORALLY DISINTEGRATING ORAL
Status: COMPLETED | OUTPATIENT
Start: 2020-06-14 | End: 2020-06-14

## 2020-06-14 RX ORDER — ONDANSETRON 4 MG/1
4 TABLET, ORALLY DISINTEGRATING ORAL EVERY 8 HOURS PRN
Qty: 15 TABLET | Refills: 0 | Status: SHIPPED | OUTPATIENT
Start: 2020-06-14 | End: 2020-06-19

## 2020-06-14 RX ADMIN — ONDANSETRON 8 MG: 8 TABLET, ORALLY DISINTEGRATING ORAL at 05:06

## 2020-06-14 NOTE — PROGRESS NOTES
Subjective:       Patient ID: Arian Farris is a 20 y.o. male.    Vitals:  height is 6' (1.829 m) and weight is 70.3 kg (155 lb). His temperature is 98.2 °F (36.8 °C). His pulse is 132 (abnormal). His respiration is 18 and oxygen saturation is 96%.     Chief Complaint: Abdominal Pain (with n/v since 3am)    Abdominal Pain  This is a chronic problem. The current episode started today. The onset quality is sudden. The problem occurs constantly. The pain is located in the LLQ, LUQ, RLQ and RUQ. The pain is at a severity of 6/10. The pain is moderate. The quality of the pain is aching and burning. The abdominal pain does not radiate. Associated symptoms include belching, nausea and vomiting. Pertinent negatives include no constipation, diarrhea, dysuria, fever or flatus. The pain is aggravated by eating. The pain is relieved by nothing. He has tried antacids for the symptoms. There is no history of abdominal surgery.       Constitution: Negative for appetite change, chills, sweating and fever.   Cardiovascular: Negative for chest pain.   Respiratory: Negative for shortness of breath.    Gastrointestinal: Positive for abdominal pain, nausea and vomiting. Negative for abdominal trauma, abdominal bloating, history of abdominal surgery, constipation, diarrhea, dark colored stools and heartburn.   Genitourinary: Negative for dysuria and pelvic pain.   Musculoskeletal: Negative for back pain.       Objective:      Physical Exam   Constitutional: He is oriented to person, place, and time. He appears well-developed.   HENT:   Head: Normocephalic and atraumatic.   Right Ear: External ear normal.   Left Ear: External ear normal.   Nose: Nose normal.   Mouth/Throat: Mucous membranes are normal.   Eyes: Conjunctivae and lids are normal.   Neck: Trachea normal and full passive range of motion without pain. Neck supple.   Cardiovascular: Normal rate, regular rhythm and normal heart sounds.   Pulmonary/Chest: Effort normal and  breath sounds normal. No respiratory distress.   Abdominal: Soft. Normal appearance and bowel sounds are normal. He exhibits no distension, no abdominal bruit, no pulsatile midline mass and no mass. There is no abdominal tenderness.   Musculoskeletal: Normal range of motion.   Neurological: He is alert and oriented to person, place, and time. He has normal strength.   Skin: Skin is warm, dry, intact, not diaphoretic and not pale.   Psychiatric: His speech is normal and behavior is normal. Judgment and thought content normal.   Nursing note and vitals reviewed.        Assessment:       No diagnosis found.    Plan:         There are no diagnoses linked to this encounter.

## 2020-06-14 NOTE — PATIENT INSTRUCTIONS
"  Vomiting (Adult)  Vomiting is a common symptom that may be due to different causes. These include gastroenteritis ("stomach flu"), food poisoning and gastritis. There are other more serious causes of vomiting which may be hard to diagnose early in the illness. Therefore, it is important to watch for the warning signs listed below.  The main danger from repeated vomiting is dehydration. This is due to excess loss of water and minerals from the body. When this occurs, body fluids must be replaced.  Home care  · If symptoms are severe, rest at home for the next 24 hours.  · Because your symptoms may be from an infection, wash your hands frequently and well, and use alcohol-based  to avoid spreading the infection to others.  · Wash your hands for at least 20 seconds. Hum the happy birthday song twice for the correct length of time.  · Wash your hands after using the toilet, before and after preparing food, before eating food, after changing a diaper, cleaning a wound, caring for a sick person, and blowing your nose, coughing, or sneezing. You should also wash your hands after caring for someone who is sick, touching pet food, or treats, and touching an animal, or animal waste.  · You may use acetaminophen or NSAID medicines like ibuprofen or naproxen to control fever, unless another medicine was prescribed. If you have chronic liver or kidney disease or ever had a stomach ulcer or GI bleeding, talk with your doctor before using these medicines. Aspirin should never be used in anyone under 18 years of age who is ill with a fever. It may cause severe liver damage. Don't use NSAID medicines if you are already taking one for another condition (like arthritis) or are on aspirin (such as for heart disease, or after a stroke)  · Avoid tobacco and alcohol use, which may worsen your symptoms.  · If medicines for vomiting were prescribed, take as directed.  · Once vomiting stops, then follow these guidelines:  During " the first 12 to 24 hours follow the diet below:  · Fruit juices. Apple, grape juice, clear fruit drinks, and electrolyte replacement drinks.  · Beverages. Soft drinks without caffeine; mineral water (plain or flavored), decaffeinated tea and coffee.  · Soups. Clear broth, consommé and bouillon  · Desserts. Plain gelatin, popsicles and fruit juice bars. As you feel better, you may add 6-8 ounces of yogurt per day.  During the next 24 hours you may add the following to the above:  · Hot cereal, plain toast, bread, rolls, crackers  · Plain noodles, rice, mashed potatoes, chicken noodle or rice soup  · Unsweetened canned fruit (avoid pineapple), bananas  · Limit caffeine and chocolate. No spices or seasonings except salt.  During the next 24 hours:  Gradually resume a normal diet, as you feel better and your symptoms lessen.  Follow-up care  Follow up with your healthcare provider, or as advised.  When to seek medical advice  Call your healthcare provider right away if any of these occur:  · Constant right-sided lower abdominal pain or increasing general abdominal pain  · Continued vomiting (unable to keep liquids down) for 24 hours  · Frequent diarrhea (more than 5 times a day); blood (red or black color) or mucus in diarrhea  · Reduced urine output or extreme thirst  · Weakness, dizziness or fainting  · Unusually drowsy or confused  · Fever of 100.4°F (38°C) oral or higher, or as directed  · Yellow color of the eyes or skin  Date Last Reviewed: 11/16/2015 © 2000-2017 [employer]. [address] [place] [address], [place] [address]. All rights reserved. This information is not intended as a substitute for professional medical care. Always follow your healthcare professional's instructions.

## 2020-08-14 ENCOUNTER — OFFICE VISIT (OUTPATIENT)
Dept: URGENT CARE | Facility: CLINIC | Age: 20
End: 2020-08-14
Payer: COMMERCIAL

## 2020-08-14 ENCOUNTER — HOSPITAL ENCOUNTER (EMERGENCY)
Facility: HOSPITAL | Age: 20
Discharge: HOME | End: 2020-08-14
Attending: EMERGENCY MEDICINE
Payer: COMMERCIAL

## 2020-08-14 VITALS
WEIGHT: 160 LBS | SYSTOLIC BLOOD PRESSURE: 139 MMHG | TEMPERATURE: 99 F | HEART RATE: 84 BPM | BODY MASS INDEX: 21.67 KG/M2 | RESPIRATION RATE: 18 BRPM | DIASTOLIC BLOOD PRESSURE: 84 MMHG | HEIGHT: 72 IN | OXYGEN SATURATION: 99 %

## 2020-08-14 VITALS
WEIGHT: 155 LBS | TEMPERATURE: 98 F | HEIGHT: 72 IN | HEART RATE: 108 BPM | OXYGEN SATURATION: 99 % | BODY MASS INDEX: 20.99 KG/M2 | DIASTOLIC BLOOD PRESSURE: 96 MMHG | SYSTOLIC BLOOD PRESSURE: 149 MMHG | RESPIRATION RATE: 20 BRPM

## 2020-08-14 DIAGNOSIS — R11.2 NON-INTRACTABLE VOMITING WITH NAUSEA, UNSPECIFIED VOMITING TYPE: Primary | ICD-10-CM

## 2020-08-14 DIAGNOSIS — N20.0 RENAL STONE: ICD-10-CM

## 2020-08-14 DIAGNOSIS — G43.A1 INTRACTABLE CYCLICAL VOMITING ASSOCIATED WITH MIGRAINE: Primary | ICD-10-CM

## 2020-08-14 DIAGNOSIS — R10.84 GENERALIZED ABDOMINAL PAIN: ICD-10-CM

## 2020-08-14 DIAGNOSIS — R11.10 VOMITING: ICD-10-CM

## 2020-08-14 LAB
ALBUMIN SERPL BCP-MCNC: 5.2 G/DL (ref 3.5–5.2)
ALP SERPL-CCNC: 100 U/L (ref 55–135)
ALT SERPL W/O P-5'-P-CCNC: 16 U/L (ref 10–44)
AMORPH CRY UR QL COMP ASSIST: ABNORMAL
ANION GAP SERPL CALC-SCNC: 14 MMOL/L (ref 8–16)
AST SERPL-CCNC: 24 U/L (ref 10–40)
BACTERIA #/AREA URNS AUTO: ABNORMAL /HPF
BASOPHILS # BLD AUTO: 0.03 K/UL (ref 0–0.2)
BASOPHILS NFR BLD: 0.3 % (ref 0–1.9)
BILIRUB SERPL-MCNC: 1.3 MG/DL (ref 0.1–1)
BILIRUB UR QL STRIP: NEGATIVE
BUN SERPL-MCNC: 16 MG/DL (ref 6–20)
CALCIUM SERPL-MCNC: 10.2 MG/DL (ref 8.7–10.5)
CHLORIDE SERPL-SCNC: 105 MMOL/L (ref 95–110)
CLARITY UR REFRACT.AUTO: ABNORMAL
CO2 SERPL-SCNC: 21 MMOL/L (ref 23–29)
COLOR UR AUTO: YELLOW
CREAT SERPL-MCNC: 1.2 MG/DL (ref 0.5–1.4)
DIFFERENTIAL METHOD: ABNORMAL
EOSINOPHIL # BLD AUTO: 0 K/UL (ref 0–0.5)
EOSINOPHIL NFR BLD: 0 % (ref 0–8)
ERYTHROCYTE [DISTWIDTH] IN BLOOD BY AUTOMATED COUNT: 12.1 % (ref 11.5–14.5)
EST. GFR  (AFRICAN AMERICAN): >60 ML/MIN/1.73 M^2
EST. GFR  (NON AFRICAN AMERICAN): >60 ML/MIN/1.73 M^2
GLUCOSE SERPL-MCNC: 133 MG/DL (ref 70–110)
GLUCOSE SERPL-MCNC: 136 MG/DL (ref 70–110)
GLUCOSE UR QL STRIP: NEGATIVE
HCT VFR BLD AUTO: 44.7 % (ref 40–54)
HGB BLD-MCNC: 15.4 G/DL (ref 14–18)
HGB UR QL STRIP: NEGATIVE
HYALINE CASTS UR QL AUTO: 0 /LPF
IMM GRANULOCYTES # BLD AUTO: 0.04 K/UL (ref 0–0.04)
IMM GRANULOCYTES NFR BLD AUTO: 0.4 % (ref 0–0.5)
KETONES UR QL STRIP: ABNORMAL
LEUKOCYTE ESTERASE UR QL STRIP: NEGATIVE
LIPASE SERPL-CCNC: 8 U/L (ref 4–60)
LYMPHOCYTES # BLD AUTO: 0.8 K/UL (ref 1–4.8)
LYMPHOCYTES NFR BLD: 7.8 % (ref 18–48)
MAGNESIUM SERPL-MCNC: 1.8 MG/DL (ref 1.6–2.6)
MCH RBC QN AUTO: 29.2 PG (ref 27–31)
MCHC RBC AUTO-ENTMCNC: 34.5 G/DL (ref 32–36)
MCV RBC AUTO: 85 FL (ref 82–98)
MICROSCOPIC COMMENT: ABNORMAL
MONOCYTES # BLD AUTO: 0.4 K/UL (ref 0.3–1)
MONOCYTES NFR BLD: 3.7 % (ref 4–15)
NEUTROPHILS # BLD AUTO: 9.5 K/UL (ref 1.8–7.7)
NEUTROPHILS NFR BLD: 87.8 % (ref 38–73)
NITRITE UR QL STRIP: NEGATIVE
NRBC BLD-RTO: 0 /100 WBC
PH UR STRIP: 7 [PH] (ref 5–8)
PLATELET # BLD AUTO: 165 K/UL (ref 150–350)
PMV BLD AUTO: 9.5 FL (ref 9.2–12.9)
POC ANION GAP: 21 MMOL/L (ref 10–20)
POC BUN: 18 MMOL/L (ref 8–26)
POC CHLORIDE: 103 MMOL/L (ref 98–109)
POC CREATININE: 1.1 MG/DL (ref 0.6–1.3)
POC HEMATOCRIT: 43 %PCV (ref 42–52)
POC HEMOGLOBIN: 14.6 G/DL (ref 13.5–18)
POC ICA: 1.15 MMOL/L (ref 1.12–1.32)
POC POTASSIUM: 4.1 MMOL/L (ref 3.5–4.9)
POC SODIUM: 139 MMOL/L (ref 138–146)
POC TCO2: 20 MMOL/L (ref 24–29)
POTASSIUM SERPL-SCNC: 3.8 MMOL/L (ref 3.5–5.1)
PROT SERPL-MCNC: 7.6 G/DL (ref 6–8.4)
PROT UR QL STRIP: ABNORMAL
RBC # BLD AUTO: 5.27 M/UL (ref 4.6–6.2)
RBC #/AREA URNS AUTO: 0 /HPF (ref 0–4)
SODIUM SERPL-SCNC: 140 MMOL/L (ref 136–145)
SP GR UR STRIP: 1.03 (ref 1–1.03)
URN SPEC COLLECT METH UR: ABNORMAL
WBC # BLD AUTO: 10.8 K/UL (ref 3.9–12.7)
WBC #/AREA URNS AUTO: 0 /HPF (ref 0–5)

## 2020-08-14 PROCEDURE — 96372 THER/PROPH/DIAG INJ SC/IM: CPT | Mod: S$GLB,,, | Performed by: EMERGENCY MEDICINE

## 2020-08-14 PROCEDURE — 74019 RADEX ABDOMEN 2 VIEWS: CPT | Mod: FY,S$GLB,, | Performed by: RADIOLOGY

## 2020-08-14 PROCEDURE — 25000003 PHARM REV CODE 250: Performed by: NURSE PRACTITIONER

## 2020-08-14 PROCEDURE — 99214 OFFICE O/P EST MOD 30 MIN: CPT | Mod: 25,S$GLB,, | Performed by: PHYSICIAN ASSISTANT

## 2020-08-14 PROCEDURE — 85025 COMPLETE CBC W/AUTO DIFF WBC: CPT

## 2020-08-14 PROCEDURE — 93005 ELECTROCARDIOGRAM TRACING: CPT

## 2020-08-14 PROCEDURE — 99285 PR EMERGENCY DEPT VISIT,LEVEL V: ICD-10-PCS | Mod: ,,, | Performed by: PHYSICIAN ASSISTANT

## 2020-08-14 PROCEDURE — 63600175 PHARM REV CODE 636 W HCPCS: Performed by: PHYSICIAN ASSISTANT

## 2020-08-14 PROCEDURE — 99214 PR OFFICE/OUTPT VISIT, EST, LEVL IV, 30-39 MIN: ICD-10-PCS | Mod: 25,S$GLB,, | Performed by: PHYSICIAN ASSISTANT

## 2020-08-14 PROCEDURE — 96374 THER/PROPH/DIAG INJ IV PUSH: CPT

## 2020-08-14 PROCEDURE — 96361 HYDRATE IV INFUSION ADD-ON: CPT

## 2020-08-14 PROCEDURE — 93010 ELECTROCARDIOGRAM REPORT: CPT | Mod: ,,, | Performed by: INTERNAL MEDICINE

## 2020-08-14 PROCEDURE — 81001 URINALYSIS AUTO W/SCOPE: CPT

## 2020-08-14 PROCEDURE — 80047 BASIC METABLC PNL IONIZED CA: CPT | Mod: QW,S$GLB,, | Performed by: PHYSICIAN ASSISTANT

## 2020-08-14 PROCEDURE — 99285 EMERGENCY DEPT VISIT HI MDM: CPT | Mod: ,,, | Performed by: PHYSICIAN ASSISTANT

## 2020-08-14 PROCEDURE — 80047 POCT CHEMISTRY PANEL: ICD-10-PCS | Mod: QW,S$GLB,, | Performed by: PHYSICIAN ASSISTANT

## 2020-08-14 PROCEDURE — 83735 ASSAY OF MAGNESIUM: CPT

## 2020-08-14 PROCEDURE — 83690 ASSAY OF LIPASE: CPT

## 2020-08-14 PROCEDURE — 80053 COMPREHEN METABOLIC PANEL: CPT

## 2020-08-14 PROCEDURE — 96372 PR INJECTION,THERAP/PROPH/DIAG2ST, IM OR SUBCUT: ICD-10-PCS | Mod: S$GLB,,, | Performed by: EMERGENCY MEDICINE

## 2020-08-14 PROCEDURE — 93010 EKG 12-LEAD: ICD-10-PCS | Mod: ,,, | Performed by: INTERNAL MEDICINE

## 2020-08-14 PROCEDURE — 99284 EMERGENCY DEPT VISIT MOD MDM: CPT | Mod: 25

## 2020-08-14 PROCEDURE — 74019 XR ABDOMEN FLAT AND ERECT: ICD-10-PCS | Mod: FY,S$GLB,, | Performed by: RADIOLOGY

## 2020-08-14 RX ORDER — HALOPERIDOL 5 MG/ML
5 INJECTION INTRAMUSCULAR
Status: COMPLETED | OUTPATIENT
Start: 2020-08-14 | End: 2020-08-14

## 2020-08-14 RX ORDER — TAMSULOSIN HYDROCHLORIDE 0.4 MG/1
0.4 CAPSULE ORAL DAILY
Qty: 14 CAPSULE | Refills: 0 | Status: SHIPPED | OUTPATIENT
Start: 2020-08-14 | End: 2020-11-20

## 2020-08-14 RX ORDER — PROMETHAZINE HYDROCHLORIDE 25 MG/ML
25 INJECTION, SOLUTION INTRAMUSCULAR; INTRAVENOUS
Status: COMPLETED | OUTPATIENT
Start: 2020-08-14 | End: 2020-08-14

## 2020-08-14 RX ORDER — PROMETHAZINE HYDROCHLORIDE 12.5 MG/1
12.5 TABLET ORAL 4 TIMES DAILY
Qty: 12 TABLET | Refills: 0 | Status: SHIPPED | OUTPATIENT
Start: 2020-08-14 | End: 2020-08-17

## 2020-08-14 RX ADMIN — PROMETHAZINE HYDROCHLORIDE 25 MG: 25 INJECTION, SOLUTION INTRAMUSCULAR; INTRAVENOUS at 03:08

## 2020-08-14 RX ADMIN — HALOPERIDOL LACTATE 5 MG: 5 INJECTION, SOLUTION INTRAMUSCULAR at 07:08

## 2020-08-14 RX ADMIN — SODIUM CHLORIDE 1000 ML: 0.9 INJECTION, SOLUTION INTRAVENOUS at 07:08

## 2020-08-14 NOTE — PATIENT INSTRUCTIONS
"Go to the emergency room for worsening or changes  Follow up with gastroenterology monday        Please arrange follow up with your primary medical clinic as soon as possible. You must understand that you've received an Urgent Care treatment only and that you may be released before all of your medical problems are known or treated. You, the patient, will arrange for follow up as instructed. If your symptoms worsen or fail to improve you should go to the Emergency Room.  WE CANNOT RULE OUT ALL POSSIBLE CAUSES OF YOUR SYMPTOMS IN THE URGENT CARE SETTING PLEASE GO TO THE ER IF YOU FEELS YOUR CONDITION IS WORSENING OR YOU WOULD LIKE EMERGENT EVALUATION.    Please return here or go to the Emergency Department for any concerns or worsening of condition.  If you were prescribed antibiotics, please take them to completion.  If you were prescribed a narcotic medication, do not drive or operate heavy equipment or machinery while taking these medications.  Please follow up with your primary care doctor or specialist as needed.    If you  smoke, please stop smoking.        Vomiting (Adult)  Vomiting is a common symptom that may be due to different causes. These include gastroenteritis ("stomach flu"), food poisoning and gastritis. There are other more serious causes of vomiting which may be hard to diagnose early in the illness. Therefore, it is important to watch for the warning signs listed below.  The main danger from repeated vomiting is dehydration. This is due to excess loss of water and minerals from the body. When this occurs, body fluids must be replaced.  Home care  · If symptoms are severe, rest at home for the next 24 hours.  · Because your symptoms may be from an infection, wash your hands frequently and well, and use alcohol-based  to avoid spreading the infection to others.  · Wash your hands for at least 20 seconds. Hum the happy birthday song twice for the correct length of time.  · Wash your hands " after using the toilet, before and after preparing food, before eating food, after changing a diaper, cleaning a wound, caring for a sick person, and blowing your nose, coughing, or sneezing. You should also wash your hands after caring for someone who is sick, touching pet food, or treats, and touching an animal, or animal waste.  · You may use acetaminophen or NSAID medicines like ibuprofen or naproxen to control fever, unless another medicine was prescribed. If you have chronic liver or kidney disease or ever had a stomach ulcer or GI bleeding, talk with your doctor before using these medicines. Aspirin should never be used in anyone under 18 years of age who is ill with a fever. It may cause severe liver damage. Don't use NSAID medicines if you are already taking one for another condition (like arthritis) or are on aspirin (such as for heart disease, or after a stroke)  · Avoid tobacco and alcohol use, which may worsen your symptoms.  · If medicines for vomiting were prescribed, take as directed.  · Once vomiting stops, then follow these guidelines:  During the first 12 to 24 hours follow the diet below:  · Fruit juices. Apple, grape juice, clear fruit drinks, and electrolyte replacement drinks.  · Beverages. Soft drinks without caffeine; mineral water (plain or flavored), decaffeinated tea and coffee.  · Soups. Clear broth, consommé and bouillon  · Desserts. Plain gelatin, popsicles and fruit juice bars. As you feel better, you may add 6-8 ounces of yogurt per day.  During the next 24 hours you may add the following to the above:  · Hot cereal, plain toast, bread, rolls, crackers  · Plain noodles, rice, mashed potatoes, chicken noodle or rice soup  · Unsweetened canned fruit (avoid pineapple), bananas  · Limit caffeine and chocolate. No spices or seasonings except salt.  During the next 24 hours:  Gradually resume a normal diet, as you feel better and your symptoms lessen.  Follow-up care  Follow up with your  healthcare provider, or as advised.  When to seek medical advice  Call your healthcare provider right away if any of these occur:  · Constant right-sided lower abdominal pain or increasing general abdominal pain  · Continued vomiting (unable to keep liquids down) for 24 hours  · Frequent diarrhea (more than 5 times a day); blood (red or black color) or mucus in diarrhea  · Reduced urine output or extreme thirst  · Weakness, dizziness or fainting  · Unusually drowsy or confused  · Fever of 100.4°F (38°C) oral or higher, or as directed  · Yellow color of the eyes or skin  Date Last Reviewed: 11/16/2015  © 3342-6614 [employer]. [address] [place] [address], [place] [address]. All rights reserved. This information is not intended as a substitute for professional medical care. Always follow your healthcare professional's instructions.

## 2020-08-14 NOTE — FIRST PROVIDER EVALUATION
Emergency Department TeleTRIAGE Encounter Note      CHIEF COMPLAINT    Chief Complaint   Patient presents with    Emesis     c/o N/V, reports has vomited multiple times today, onset 12:30pm, hx of cyclic vomiting syndroms, last happened in june, 150cc of dark green emesis in triage       VITAL SIGNS   Initial Vitals [08/14/20 1831]   BP Pulse Resp Temp SpO2   (!) 167/87 98 20 97.6 °F (36.4 °C) 100 %      MAP       --            ALLERGIES    Review of patient's allergies indicates:  No Known Allergies    PROVIDER TRIAGE NOTE  Pt os a 19 yo male presenting for abdominal pain since 1200.  Pt has cyclic vomiting syndrome.  Pt sent from  for evaluation.  Pt denies any marijuana use.  Pt had phenergan at 1545 with no relief.        ORDERS  Labs Reviewed - No data to display    ED Orders (720h ago, onward)    Start Ordered     Status Ordering Provider    08/14/20 1845 08/14/20 1844  Vital signs  Every 2 hours      Ordered CARLOS REYNA    08/14/20 1845 08/14/20 1844  Diet NPO  Diet effective now      Ordered CARLOS REYNA    08/14/20 1845 08/14/20 1844  Insert peripheral IV  Once      Ordered CARLOS REYNA    08/14/20 1845 08/14/20 1844  CBC W/ AUTO DIFFERENTIAL  STAT      Ordered CARLOS REYNA    08/14/20 1845 08/14/20 1844  Comp. Metabolic Panel  STAT      Ordered CARLOS REYNA    08/14/20 1845 08/14/20 1844  Lipase  STAT      Ordered CARLOS REYNA    08/14/20 1845 08/14/20 1844  Urinalysis, Reflex to Urine Culture Urine, Clean Catch  STAT      Ordered CARLOS REYNA    08/14/20 1845 08/14/20 1844  sodium chloride 0.9% bolus 1,000 mL  Once      Ordered CARLOS REYNA    08/14/20 1845 08/14/20 1844  EKG 12-lead  Once      Ordered CARLOS REYNA            Virtual Visit Note: The provider triage portion of this emergency department evaluation and documentation was performed via [employer], a HIPAA-compliant telemedicine application, in concert with a tele-presenter in the room. A face to face  patient evaluation with one of my colleagues will occur once the patient is placed in an emergency department room.      DISCLAIMER: This note was prepared with [employer] voice recognition transcription software. Garbled syntax, mangled pronouns, and other bizarre constructions may be attributed to that software system.

## 2020-08-14 NOTE — PROGRESS NOTES
Subjective:       Patient ID: Arian Farris is a 20 y.o. male.    Vitals:  height is 6' (1.829 m) and weight is 70.3 kg (155 lb). His temperature is 97.8 °F (36.6 °C). His blood pressure is 149/96 (abnormal) and his pulse is 108. His respiration is 20 and oxygen saturation is 99%.     Chief Complaint: Abdominal Pain    This is a 20 y.o. male with hx of cyclical vomiting who presents today with a chief complaint of abdominal pain and vomiting onset 1230pm and continuing since then . Vomiting more than 10 times. Diffuse generalized abdominal pain. Denies fevers. Does report diarrhea for 3 days. Watery and brown. States he does have this from time to time. No blood in stool or vomit.   Denies urinary changes, dysuria, frequency, urgency, hematuria.     He does have a hx of cyclical vomiting and abdominal migraines since he was a child.    Abdominal Pain  This is a new problem. The current episode started today. The onset quality is sudden. The problem occurs constantly. The problem has been unchanged. The pain is located in the generalized abdominal region. The pain is at a severity of 10/10. The pain is severe. The quality of the pain is aching and cramping. The abdominal pain does not radiate. Associated symptoms include diarrhea, nausea and vomiting. Pertinent negatives include no constipation, dysuria or fever. Nothing aggravates the pain. The pain is relieved by nothing. He has tried antacids (zofran) for the symptoms. The treatment provided no relief. His past medical history is significant for GERD. There is no history of abdominal surgery, colon cancer, gallstones or irritable bowel syndrome. Patient's medical history does not include kidney stones and UTI.       Constitution: Positive for sweating and generalized weakness. Negative for appetite change, chills and fever.   Cardiovascular: Negative for chest pain.   Respiratory: Negative for shortness of breath.    Gastrointestinal: Positive for abdominal  pain, nausea, vomiting and diarrhea. Negative for abdominal trauma, abdominal bloating, history of abdominal surgery, constipation, dark colored stools and heartburn.   Genitourinary: Negative for dysuria and pelvic pain.   Musculoskeletal: Negative for back pain.       Objective:      Physical Exam   Constitutional: He is oriented to person, place, and time. He appears well-developed.   HENT:   Head: Normocephalic and atraumatic.   Ears:   Right Ear: External ear normal.   Left Ear: External ear normal.   Nose: Nose normal.   Mouth/Throat: Mucous membranes are normal.   Eyes: Conjunctivae and lids are normal.   Neck: Trachea normal and full passive range of motion without pain. Neck supple.   Cardiovascular: Normal rate, regular rhythm and normal heart sounds.   Pulmonary/Chest: Effort normal and breath sounds normal. No respiratory distress.   Abdominal: Soft. Normal appearance and bowel sounds are normal. He exhibits no distension, no abdominal bruit, no pulsatile midline mass and no mass. There is generalized abdominal tenderness (mild). There is no rebound, no guarding and no tenderness at McBurney's point.      Comments: Pt vomiting during exam    Musculoskeletal: Normal range of motion.   Neurological: He is alert and oriented to person, place, and time. He has normal strength.   Skin: Skin is warm, dry, intact, not diaphoretic and not pale. Psychiatric: His speech is normal and behavior is normal. Judgment and thought content normal.   Nursing note and vitals reviewed.  X-ray Abdomen Flat And Erect    Result Date: 8/14/2020  EXAMINATION: XR ABDOMEN FLAT AND ERECT CLINICAL HISTORY: Nausea with vomiting, unspecified TECHNIQUE: Flat and erect AP views of the abdomen were preformed. COMPARISON: Plain film from 06/14/2020 FINDINGS: 3 mm calcification in the expected region of the left kidney which may represent a stone.  No dilated loops of small bowel.  Small amount of stool within the colon.  Lung bases are  clear.  No free air under the diaphragm.  Osseous structures are unremarkable.     No acute process. 3 mm calcification, possibly representing a left renal stone. Electronically signed by: Ian Jimenez MD Date:    08/14/2020 Time:    16:36        Assessment:       1. Intractable cyclical vomiting associated with migraine    2. Generalized abdominal pain    3. Renal stone        Plan:       Pt with hx of cyclical vomiting and abdominal migraines since childhood. Presents with abdominal pain, N/V. States this feels similar to previous episodes of cyclical vomiting in the past. Took zofran 8mg PTA with no relief.    Given phenergan 25 IM in clinic. Observed after phenergan. I recommended that patient wait and be observed for discontinuation of vomiting and asked that he try to tolerate a cup of water PO. He declined and left AMA because he wanted to rest at home. He did vomit about 30 minutes prior to leaving.     Follow up:  After patient left urgent care, the radiologist read abdominal xray with questionable left kidney stone. Called patient and mom to discuss. Scheduled outpatient CT renal study. On further questioning, mom states patient is still vomiting profusely at this time. At this time, I do recommend that patient be seen in the ED for intractable vomiting. Mom will drive him to [facility] at this time for further evaluation, labs, imaging as necessary, IV fluids, observation, pain control. All questions answered, she is happy with plan.    Intractable cyclical vomiting associated with migraine  -     promethazine injection 25 mg  -     POCT Chemistry Panel  -     X-Ray Abdomen Flat And Erect; Future; Expected date: 08/14/2020    Generalized abdominal pain  -     CT Renal Stone Study ABD Pelvis WO; Future; Expected date: 08/14/2020    Renal stone    Other orders  -     promethazine (PHENERGAN) 12.5 MG Tab; Take 1 tablet (12.5 mg total) by mouth 4 (four) times daily. for 3 days  Dispense: 12 tablet;  "Refill: 0  -     tamsulosin (FLOMAX) 0.4 mg Cap; Take 1 capsule (0.4 mg total) by mouth once daily. Until stone passage for 14 days  Dispense: 14 capsule; Refill: 0    Labs reviewed, pertinent imaging reviewed, previous medical records, medical history, surgical history, social history, family history reviewed.       Patient Instructions   Go to the emergency room for worsening or changes  Follow up with gastroenterology monday        Please arrange follow up with your primary medical clinic as soon as possible. You must understand that you've received an Urgent Care treatment only and that you may be released before all of your medical problems are known or treated. You, the patient, will arrange for follow up as instructed. If your symptoms worsen or fail to improve you should go to the Emergency Room.  WE CANNOT RULE OUT ALL POSSIBLE CAUSES OF YOUR SYMPTOMS IN THE URGENT CARE SETTING PLEASE GO TO THE ER IF YOU FEELS YOUR CONDITION IS WORSENING OR YOU WOULD LIKE EMERGENT EVALUATION.    Please return here or go to the Emergency Department for any concerns or worsening of condition.  If you were prescribed antibiotics, please take them to completion.  If you were prescribed a narcotic medication, do not drive or operate heavy equipment or machinery while taking these medications.  Please follow up with your primary care doctor or specialist as needed.    If you  smoke, please stop smoking.        Vomiting (Adult)  Vomiting is a common symptom that may be due to different causes. These include gastroenteritis ("stomach flu"), food poisoning and gastritis. There are other more serious causes of vomiting which may be hard to diagnose early in the illness. Therefore, it is important to watch for the warning signs listed below.  The main danger from repeated vomiting is dehydration. This is due to excess loss of water and minerals from the body. When this occurs, body fluids must be replaced.  Home care  · If symptoms " are severe, rest at home for the next 24 hours.  · Because your symptoms may be from an infection, wash your hands frequently and well, and use alcohol-based  to avoid spreading the infection to others.  · Wash your hands for at least 20 seconds. Hum the happy birthday song twice for the correct length of time.  · Wash your hands after using the toilet, before and after preparing food, before eating food, after changing a diaper, cleaning a wound, caring for a sick person, and blowing your nose, coughing, or sneezing. You should also wash your hands after caring for someone who is sick, touching pet food, or treats, and touching an animal, or animal waste.  · You may use acetaminophen or NSAID medicines like ibuprofen or naproxen to control fever, unless another medicine was prescribed. If you have chronic liver or kidney disease or ever had a stomach ulcer or GI bleeding, talk with your doctor before using these medicines. Aspirin should never be used in anyone under 18 years of age who is ill with a fever. It may cause severe liver damage. Don't use NSAID medicines if you are already taking one for another condition (like arthritis) or are on aspirin (such as for heart disease, or after a stroke)  · Avoid tobacco and alcohol use, which may worsen your symptoms.  · If medicines for vomiting were prescribed, take as directed.  · Once vomiting stops, then follow these guidelines:  During the first 12 to 24 hours follow the diet below:  · Fruit juices. Apple, grape juice, clear fruit drinks, and electrolyte replacement drinks.  · Beverages. Soft drinks without caffeine; mineral water (plain or flavored), decaffeinated tea and coffee.  · Soups. Clear broth, consommé and bouillon  · Desserts. Plain gelatin, popsicles and fruit juice bars. As you feel better, you may add 6-8 ounces of yogurt per day.  During the next 24 hours you may add the following to the above:  · Hot cereal, plain toast, bread, rolls,  crackers  · Plain noodles, rice, mashed potatoes, chicken noodle or rice soup  · Unsweetened canned fruit (avoid pineapple), bananas  · Limit caffeine and chocolate. No spices or seasonings except salt.  During the next 24 hours:  Gradually resume a normal diet, as you feel better and your symptoms lessen.  Follow-up care  Follow up with your healthcare provider, or as advised.  When to seek medical advice  Call your healthcare provider right away if any of these occur:  · Constant right-sided lower abdominal pain or increasing general abdominal pain  · Continued vomiting (unable to keep liquids down) for 24 hours  · Frequent diarrhea (more than 5 times a day); blood (red or black color) or mucus in diarrhea  · Reduced urine output or extreme thirst  · Weakness, dizziness or fainting  · Unusually drowsy or confused  · Fever of 100.4°F (38°C) oral or higher, or as directed  · Yellow color of the eyes or skin  Date Last Reviewed: 11/16/2015  © 5687-8104 [employer] [address] [place] [address], [place] [address]. All rights reserved. This information is not intended as a substitute for professional medical care. Always follow your healthcare professional's instructions.

## 2020-08-15 NOTE — ED PROVIDER NOTES
Encounter Date: 8/14/2020       History     Chief Complaint   Patient presents with    Emesis     c/o N/V, reports has vomited multiple times today, onset 12:30pm, hx of cyclic vomiting syndroms, last happened in june, 150cc of dark green emesis in triage     20-year-old white male with history of migraines, cyclical vomiting presents to the ED complaining of nausea and vomiting that started 12:30 this afternoon.  He states he has been unable to tolerate anything by mouth, including water.  He was seen at urgent care earlier today were was given IM Phenergan with no improvement of his symptoms and then sent to the ED for further evaluation.  He reports 10/10 generalized abdominal pain and cramping, chills.  He does report a history of the same and that this feels like his normal episodes of cyclical vomiting.  He denies fever, chest pain, dysuria, abdominal trauma.  Past surgical history significant for cholecystectomy.    The history is provided by the patient.     Review of patient's allergies indicates:  No Known Allergies  Past Medical History:   Diagnosis Date    Abdominal migraine     Cyclical vomiting with nausea     Eczema     H/O multiple concussions     Migraines      Past Surgical History:   Procedure Laterality Date    ADENOIDECTOMY      CHOLECYSTECTOMY  01/2018    TONSILLECTOMY       Family History   Problem Relation Age of Onset    Other Mother         Hypoglycemia    Heart disease Mother         Mitral valve prolapse    Migraines Mother     Migraines Sister     Hypertension Maternal Grandmother     Diabetes Maternal Grandmother     Migraines Maternal Grandmother     Hypertension Maternal Grandfather     Diabetes Maternal Grandfather     Hypertension Paternal Grandmother     Diabetes Paternal Grandmother     Hypertension Paternal Grandfather     Diabetes Paternal Grandfather     Heart disease Paternal Grandfather     Hyperlipidemia Paternal Grandfather     No Known Problems  Father     Short stature Neg Hx     Thyroid disease Neg Hx     Colon cancer Neg Hx     Esophageal cancer Neg Hx      Social History     Tobacco Use    Smoking status: Passive Smoke Exposure - Never Smoker    Smokeless tobacco: Never Used    Tobacco comment: Dad smokes outside   Substance Use Topics    Alcohol use: Yes     Alcohol/week: 12.0 standard drinks     Types: 12 Standard drinks or equivalent per week     Frequency: 2-3 times a week     Drinks per session: 3 or 4     Binge frequency: Monthly    Drug use: No     Review of Systems   Constitutional: Positive for chills. Negative for fever.   HENT: Negative for congestion, rhinorrhea and sore throat.    Eyes: Negative for photophobia and visual disturbance.   Respiratory: Negative for cough and shortness of breath.    Cardiovascular: Negative for chest pain.   Gastrointestinal: Positive for abdominal pain, diarrhea, nausea and vomiting. Negative for constipation.   Genitourinary: Negative for dysuria and hematuria.   Musculoskeletal: Negative for arthralgias.   Skin: Negative for rash and wound.   Neurological: Negative for dizziness, weakness, numbness and headaches.   Psychiatric/Behavioral: Negative for confusion.       Physical Exam     Initial Vitals [08/14/20 1831]   BP Pulse Resp Temp SpO2   (!) 167/87 98 20 97.6 °F (36.4 °C) 100 %      MAP       --         Physical Exam    Nursing note and vitals reviewed.  Constitutional: He appears well-developed and well-nourished. He is not diaphoretic.   Uncomfortable secondary to abdominal pain   HENT:   Head: Normocephalic and atraumatic.   Neck: Normal range of motion. Neck supple.   Cardiovascular: Regular rhythm and normal heart sounds. Tachycardia present.  Exam reveals no gallop and no friction rub.    No murmur heard.  Pulmonary/Chest: Breath sounds normal. He has no wheezes. He has no rhonchi. He has no rales.   Abdominal: Soft. Bowel sounds are normal. There is abdominal tenderness (generalized).  There is no rebound and no guarding.   Musculoskeletal: Normal range of motion.   Neurological: He is alert and oriented to person, place, and time.   Skin: Skin is warm and dry. No rash noted. No erythema.   Psychiatric: He has a normal mood and affect.         ED Course   Procedures  Labs Reviewed   CBC W/ AUTO DIFFERENTIAL - Abnormal; Notable for the following components:       Result Value    Gran # (ANC) 9.5 (*)     Lymph # 0.8 (*)     Gran% 87.8 (*)     Lymph% 7.8 (*)     Mono% 3.7 (*)     All other components within normal limits   COMPREHENSIVE METABOLIC PANEL - Abnormal; Notable for the following components:    CO2 21 (*)     Glucose 136 (*)     Total Bilirubin 1.3 (*)     All other components within normal limits   URINALYSIS, REFLEX TO URINE CULTURE - Abnormal; Notable for the following components:    Appearance, UA Cloudy (*)     Protein, UA 2+ (*)     Ketones, UA 3+ (*)     All other components within normal limits    Narrative:     Specimen Source->Urine   URINALYSIS MICROSCOPIC - Abnormal; Notable for the following components:    Amorphous, UA Many (*)     All other components within normal limits    Narrative:     Specimen Source->Urine   LIPASE   MAGNESIUM   MAGNESIUM    Narrative:     add on test magnesium per dr polina arriaga order# [id number]    08/14/2020  20:42      EKG Readings: (Independently Interpreted)   Rhythm: Sinus Tachycardia. Other Findings: Prolonged QT Interval.       Imaging Results    None          Medical Decision Making:   History:   Old Medical Records: I decided to obtain old medical records.  Clinical Tests:   Lab Tests: Reviewed and Ordered  Radiological Study: Ordered and Reviewed       APC / Resident Notes:   20-year-old white male with history of migraines, cyclical vomiting presents to the ED complaining of nausea and vomiting that started 12:30 this afternoon.  Tachycardic.  Appears uncomfortable secondary to pain.  Abdomen soft with generalized tenderness.  Differential  diagnosis includes but is not limited to dehydration, gastritis, cyclical vomiting, acute kidney injury, electrolyte abnormality, pancreatitis.  Will obtain labs, give IV fluids and Haldol.    After Haldol was given, EKG obtained which shows prolonged QT.  Patient placed on cardiac monitor.  Mag ordered.    No leukocytosis.  CMP unremarkable.  Lipase normal.    Patient reports complete resolution of symptoms after IV fluids and IV Haldol.  States that he wants to leave.  Urine and Mag still pending.  Patient states that he will not wait for these labs results and would prefer to sign out AMA.  AMA form signed.                            Clinical Impression:       ICD-10-CM ICD-9-CM   1. Non-intractable vomiting with nausea, unspecified vomiting type  R11.2 787.01   2. Vomiting  R11.10 787.03         Disposition:   Disposition: AMA     ED Disposition Condition    AMA                           Rosaline Marcelo PA-C  08/15/20 0049

## 2020-08-15 NOTE — ED NOTES
Pt w/ hx of cyclical vomiting presents to ED with c/o diarrhea over last few days and emesis x1 day. Pt is unsure of how many episodes of emesis he has had. Pt states that last time this happened was in June.

## 2020-10-10 ENCOUNTER — OFFICE VISIT (OUTPATIENT)
Dept: URGENT CARE | Facility: CLINIC | Age: 20
End: 2020-10-10
Payer: COMMERCIAL

## 2020-10-10 VITALS
BODY MASS INDEX: 21.67 KG/M2 | HEIGHT: 72 IN | SYSTOLIC BLOOD PRESSURE: 148 MMHG | TEMPERATURE: 98 F | OXYGEN SATURATION: 100 % | HEART RATE: 95 BPM | WEIGHT: 160 LBS | DIASTOLIC BLOOD PRESSURE: 85 MMHG | RESPIRATION RATE: 20 BRPM

## 2020-10-10 DIAGNOSIS — E86.0 DEHYDRATION, MODERATE: ICD-10-CM

## 2020-10-10 DIAGNOSIS — R11.2 INTRACTABLE VOMITING WITH NAUSEA, UNSPECIFIED VOMITING TYPE: Primary | ICD-10-CM

## 2020-10-10 PROCEDURE — 96372 PR INJECTION,THERAP/PROPH/DIAG2ST, IM OR SUBCUT: ICD-10-PCS | Mod: S$GLB,,, | Performed by: INTERNAL MEDICINE

## 2020-10-10 PROCEDURE — 99214 OFFICE O/P EST MOD 30 MIN: CPT | Mod: 25,S$GLB,, | Performed by: INTERNAL MEDICINE

## 2020-10-10 PROCEDURE — 99214 PR OFFICE/OUTPT VISIT, EST, LEVL IV, 30-39 MIN: ICD-10-PCS | Mod: 25,S$GLB,, | Performed by: INTERNAL MEDICINE

## 2020-10-10 PROCEDURE — 96372 THER/PROPH/DIAG INJ SC/IM: CPT | Mod: S$GLB,,, | Performed by: INTERNAL MEDICINE

## 2020-10-10 RX ORDER — ONDANSETRON HYDROCHLORIDE 8 MG/1
8 TABLET, FILM COATED ORAL EVERY 8 HOURS PRN
Qty: 20 TABLET | Refills: 0 | Status: SHIPPED | OUTPATIENT
Start: 2020-10-10 | End: 2020-11-20

## 2020-10-10 RX ORDER — SODIUM CHLORIDE 9 MG/ML
INJECTION, SOLUTION INTRAVENOUS ONCE
Status: COMPLETED | OUTPATIENT
Start: 2020-10-10 | End: 2020-10-10

## 2020-10-10 RX ORDER — PROMETHAZINE HYDROCHLORIDE 25 MG/ML
25 INJECTION, SOLUTION INTRAMUSCULAR; INTRAVENOUS
Status: COMPLETED | OUTPATIENT
Start: 2020-10-10 | End: 2020-10-10

## 2020-10-10 RX ADMIN — SODIUM CHLORIDE: 9 INJECTION, SOLUTION INTRAVENOUS at 06:10

## 2020-10-10 RX ADMIN — PROMETHAZINE HYDROCHLORIDE 25 MG: 25 INJECTION, SOLUTION INTRAMUSCULAR; INTRAVENOUS at 06:10

## 2020-10-10 NOTE — PROGRESS NOTES
Subjective:       Patient ID: Arian Farris is a 20 y.o. male.    Vitals:  height is 6' (1.829 m) and weight is 72.6 kg (160 lb). His temporal temperature is 98.4 °F (36.9 °C). His blood pressure is 148/85 (abnormal) and his pulse is 95. His respiration is 20 and oxygen saturation is 100%.     Chief Complaint: Emesis    Emesis   This is a new problem. The current episode started today. The problem occurs 5 to 10 times per day. The problem has been gradually worsening. The emesis has an appearance of bile. There has been no fever. Associated symptoms include abdominal pain, chills and headaches. Pertinent negatives include no arthralgias, chest pain, coughing, diarrhea, dizziness, fever or myalgias. He has tried nothing for the symptoms. The treatment provided no relief.       Constitution: Positive for activity change, appetite change, chills and fatigue. Negative for sweating and fever.   HENT: Negative for congestion and sore throat.    Neck: Negative for painful lymph nodes.   Cardiovascular: Negative for chest pain and leg swelling.   Eyes: Negative for double vision and blurred vision.   Respiratory: Negative for cough and shortness of breath.    Gastrointestinal: Positive for abdominal pain, nausea and vomiting. Negative for abdominal trauma, abdominal bloating, history of abdominal surgery, constipation, diarrhea, dark colored stools and heartburn.   Genitourinary: Negative for dysuria, frequency, urgency and pelvic pain.   Musculoskeletal: Negative for joint pain, joint swelling, back pain, muscle cramps and muscle ache.   Skin: Negative for color change, pale and rash.   Allergic/Immunologic: Negative for seasonal allergies.   Neurological: Positive for headaches and history of migraines. Negative for dizziness, history of vertigo, light-headedness and passing out.   Hematologic/Lymphatic: Negative for swollen lymph nodes, easy bruising/bleeding and history of blood clots. Does not bruise/bleed  easily.   Psychiatric/Behavioral: Negative for nervous/anxious, sleep disturbance and depression. The patient is not nervous/anxious.         Anxiety       Objective:      Physical Exam   Constitutional: He appears ill.   HENT:   Head: Normocephalic and atraumatic.   Nose: Nose normal.   Mouth/Throat: Mucous membranes are dry.   Eyes: Conjunctivae are normal. extraocular movement intact  Abdominal: Soft. Normal appearance.      Comments: Hyperactive bowel sounds   Skin: Skin is warm and dry.   Nursing note and vitals reviewed.        Assessment:       1. Intractable vomiting with nausea, unspecified vomiting type    2. Dehydration, moderate        Plan:         Intractable vomiting with nausea, unspecified vomiting type  -     0.9%  NaCl infusion  -     promethazine injection 25 mg  -     ondansetron (ZOFRAN) 8 MG tablet; Take 1 tablet (8 mg total) by mouth every 8 (eight) hours as needed for Nausea.  Dispense: 20 tablet; Refill: 0    Dehydration, moderate  -     0.9%  NaCl infusion  -     promethazine injection 25 mg  -     ondansetron (ZOFRAN) 8 MG tablet; Take 1 tablet (8 mg total) by mouth every 8 (eight) hours as needed for Nausea.  Dispense: 20 tablet; Refill: 0

## 2020-11-17 NOTE — PROGRESS NOTES
[facility] Consultation Note    Reason for Consult:  The primary encounter diagnosis was Long QT syndrome. Diagnoses of Non-intractable cyclical vomiting with nausea and Migraine equivalent were also pertinent to this visit.    PCP:   SUSAN Jo       Referring MD:  THIERRY Jo Md  [address] [facility]  [place] [address]    HPI:  This is a 20 y.o. male here for evaluation of cyclic vomiting.  Please see the consult note from recent hospital stay for full description of his history.  He was diagnosed with abdominal migraines as a child, had EGD and colon, and did fine for 11 years. Then recently started having recurrent attacks of nausea and vomiting.  HIDA with EF of 40% and had GB removed but no improvement, recent EGD was unremarkable.  States that he is fine in between attacks, denies THC. Most recent attack triggered at [employer]    Abdominal pain - no  Reflux - no  Dysphagia - no   Bowel habits - normal  GI bleeding - none  NSAID usage - occasional excedrin for migraines, but pretty rare    Interval history 11/20/2020:  Had flareups June 14, August 14, and October 10  Was taking promethazine under his tongue which helps a little  Goes into a dark room and sit in the tub with warm water    Has not been taking elavil since last winter  One episode felt like a panic attack, one with vertigo      ROS:  Constitutional: No fevers, chills, No weight loss  ENT: + seasonal allergies, + sinus infection, + epistaxis cauterized this January  CV: No chest pain  Pulm: No cough, No shortness of breath  Ophtho: No vision changes  GI: see HPI  Derm: No rash  Heme: No lymphadenopathy, No bruising  MSK: No arthritis  : No dysuria, No hematuria  Endo: No hot or cold intolerance  Neuro: + migraines  Psych: No anxiety, No depression    Medical History:  has a past medical history of Abdominal migraine, Cyclical vomiting with nausea, Eczema, H/O multiple concussions, and  Migraines.    Surgical History:  has a past surgical history that includes Adenoidectomy; Tonsillectomy; and Cholecystectomy (01/2018).      Review of patient's allergies indicates:  No Known Allergies    Current Outpatient Medications   Medication Sig Dispense Refill    amitriptyline (ELAVIL) 50 MG tablet Take 1/2 tab at night x 1 week then 1 tab every night (Patient not taking: Reported on 10/10/2020) 30 tablet 5    colestipoL (COLESTID) 1 gram Tab Take 1 tablet (1 g total) by mouth 2 (two) times daily. Take as needed for diarrhea (Patient not taking: Reported on 8/14/2020) 60 tablet 3    ondansetron (ZOFRAN-ODT) 4 MG TbDL Take 1 tablet (4 mg total) by mouth every 8 (eight) hours as needed. 20 tablet 3     No current facility-administered medications for this visit.            Objective Findings:    Vital Signs:  BP (!) 140/85 (BP Location: Right arm, Patient Position: Sitting, BP Method: Medium (Automatic))   Pulse 88   Ht 6' (1.829 m)   Wt 70.3 kg (154 lb 15.7 oz)   BMI 21.02 kg/m²   Body mass index is 21.02 kg/m².    Physical Exam:  General Appearance: Well appearing in no acute distress  Head:   Normocephalic, without obvious abnormality  Eyes:    No scleral icterus, EOMI  ENT: Neck supple, Lips, mucosa, and tongue normal; teeth and gums normal  Lungs: CTA bilaterally in anterior and posterior fields, no wheezes, no crackles.  Heart:  Regular rate and rhythm, S1, S2 normal, no murmurs heard  Abdomen: Soft, non tender, non distended with positive bowel sounds in all four quadrants. No hepatosplenomegaly, ascites, or mass  Extremities: 2+ pulses, no clubbing, cyanosis or edema  Skin: No rash  Neurologic: CN II-XII intact      Labs:  Lab Results   Component Value Date    WBC 10.80 08/14/2020    HGB 15.4 08/14/2020    HCT 44.7 08/14/2020     08/14/2020    CHOL 163 11/25/2019    TRIG 113 11/25/2019    HDL 61 11/25/2019    ALT 16 08/14/2020    AST 24 08/14/2020     08/14/2020    K 3.8 08/14/2020      2020    CREATININE 1.2 2020    BUN 16 2020    CO2 21 (L) 2020    TSH 1.381 2018       No components found for: H. PYLORI IGG INTERP     24hr 5HIAA - wnl  Urine pbg - wnl  HAE markers - wnl    Imagin NM GES - wnl  2017 HIDA - EF 40%   Endoscopy:    EGD - 2017 wnl      Assessment:  1. Long QT syndrome    2. Non-intractable cyclical vomiting with nausea    3. Migraine equivalent      Cyclic vomiting.     He doesn't like taking the elavil all the time and self stopped it. It seems that his episodes are always in the spring and related to sporting events (, )     Recommendations:  1. He does not want to take elavil, will do Coenzyme q10 200 mg twice a day  L carnitine 1000 mg twice a day   2. Zofran refilled  3. Neurology referral for migraine abortive medication which can help CVS episodes  4. Recheck EKG        No follow-ups on file.      Order summary:  Orders Placed This Encounter    Ambulatory referral/consult to Neurology    EKG 12-lead    ondansetron (ZOFRAN-ODT) 4 MG TbDL         Thank you so much for allowing me to participate in the care of Airan Breaux MD

## 2020-11-18 ENCOUNTER — PATIENT OUTREACH (OUTPATIENT)
Dept: ADMINISTRATIVE | Facility: OTHER | Age: 20
End: 2020-11-18

## 2020-11-20 ENCOUNTER — OFFICE VISIT (OUTPATIENT)
Dept: NEUROLOGY | Facility: CLINIC | Age: 20
End: 2020-11-20
Payer: COMMERCIAL

## 2020-11-20 ENCOUNTER — HOSPITAL ENCOUNTER (OUTPATIENT)
Dept: CARDIOLOGY | Facility: CLINIC | Age: 20
Discharge: HOME | End: 2020-11-20
Payer: COMMERCIAL

## 2020-11-20 ENCOUNTER — TELEPHONE (OUTPATIENT)
Dept: NEUROLOGY | Facility: CLINIC | Age: 20
End: 2020-11-20

## 2020-11-20 ENCOUNTER — OFFICE VISIT (OUTPATIENT)
Dept: GASTROENTEROLOGY | Facility: CLINIC | Age: 20
End: 2020-11-20
Attending: SURGERY
Payer: COMMERCIAL

## 2020-11-20 VITALS
SYSTOLIC BLOOD PRESSURE: 140 MMHG | WEIGHT: 155 LBS | DIASTOLIC BLOOD PRESSURE: 85 MMHG | BODY MASS INDEX: 20.99 KG/M2 | HEIGHT: 72 IN | HEART RATE: 88 BPM

## 2020-11-20 DIAGNOSIS — R11.15 NON-INTRACTABLE CYCLICAL VOMITING WITH NAUSEA: ICD-10-CM

## 2020-11-20 DIAGNOSIS — G43.009 MIGRAINE WITHOUT AURA AND WITHOUT STATUS MIGRAINOSUS, NOT INTRACTABLE: Primary | ICD-10-CM

## 2020-11-20 DIAGNOSIS — I45.81 LONG QT SYNDROME: Primary | ICD-10-CM

## 2020-11-20 DIAGNOSIS — I45.81 LONG QT SYNDROME: ICD-10-CM

## 2020-11-20 DIAGNOSIS — G43.D0 ABDOMINAL MIGRAINE, NOT INTRACTABLE: ICD-10-CM

## 2020-11-20 DIAGNOSIS — G43.109 MIGRAINE EQUIVALENT: ICD-10-CM

## 2020-11-20 DIAGNOSIS — R51.9 NONINTRACTABLE EPISODIC HEADACHE, UNSPECIFIED HEADACHE TYPE: ICD-10-CM

## 2020-11-20 DIAGNOSIS — R11.15 CYCLICAL VOMITING: ICD-10-CM

## 2020-11-20 PROCEDURE — 93010 EKG 12-LEAD: ICD-10-PCS | Mod: S$GLB,,, | Performed by: INTERNAL MEDICINE

## 2020-11-20 PROCEDURE — 93005 EKG 12-LEAD: ICD-10-PCS | Mod: S$GLB,,, | Performed by: INTERNAL MEDICINE

## 2020-11-20 PROCEDURE — 99999 PR PBB SHADOW E&M-EST. PATIENT-LVL III: ICD-10-PCS | Mod: PBBFAC,,, | Performed by: INTERNAL MEDICINE

## 2020-11-20 PROCEDURE — 3008F PR BODY MASS INDEX (BMI) DOCUMENTED: ICD-10-PCS | Mod: CPTII,S$GLB,, | Performed by: INTERNAL MEDICINE

## 2020-11-20 PROCEDURE — 3008F BODY MASS INDEX DOCD: CPT | Mod: CPTII,S$GLB,, | Performed by: INTERNAL MEDICINE

## 2020-11-20 PROCEDURE — 1126F PR PAIN SEVERITY QUANTIFIED, NO PAIN PRESENT: ICD-10-PCS | Mod: S$GLB,,, | Performed by: INTERNAL MEDICINE

## 2020-11-20 PROCEDURE — 99999 PR PBB SHADOW E&M-EST. PATIENT-LVL III: CPT | Mod: PBBFAC,,, | Performed by: INTERNAL MEDICINE

## 2020-11-20 PROCEDURE — 99214 PR OFFICE/OUTPT VISIT, EST, LEVL IV, 30-39 MIN: ICD-10-PCS | Mod: S$GLB,,, | Performed by: INTERNAL MEDICINE

## 2020-11-20 PROCEDURE — 99205 OFFICE O/P NEW HI 60 MIN: CPT | Mod: 95,,, | Performed by: PHYSICIAN ASSISTANT

## 2020-11-20 PROCEDURE — 1126F AMNT PAIN NOTED NONE PRSNT: CPT | Mod: S$GLB,,, | Performed by: INTERNAL MEDICINE

## 2020-11-20 PROCEDURE — 93010 ELECTROCARDIOGRAM REPORT: CPT | Mod: S$GLB,,, | Performed by: INTERNAL MEDICINE

## 2020-11-20 PROCEDURE — 99205 PR OFFICE/OUTPT VISIT, NEW, LEVL V, 60-74 MIN: ICD-10-PCS | Mod: 95,,, | Performed by: PHYSICIAN ASSISTANT

## 2020-11-20 PROCEDURE — 93005 ELECTROCARDIOGRAM TRACING: CPT | Mod: S$GLB,,, | Performed by: INTERNAL MEDICINE

## 2020-11-20 PROCEDURE — 99214 OFFICE O/P EST MOD 30 MIN: CPT | Mod: S$GLB,,, | Performed by: INTERNAL MEDICINE

## 2020-11-20 RX ORDER — ONDANSETRON 4 MG/1
4 TABLET, ORALLY DISINTEGRATING ORAL EVERY 8 HOURS PRN
Qty: 20 TABLET | Refills: 3 | Status: SHIPPED | OUTPATIENT
Start: 2020-11-20 | End: 2022-09-01

## 2020-11-20 NOTE — PATIENT INSTRUCTIONS
Plan:   - Begin taking the following vitamins/supplements daily:    - Riboflavin (Vitamin B2) - 400mg by mouth daily,    - Coenzyme Q10 - 200mg tablet by mouth twice daily (as also recommended by your GI specialist)    - L carnitine 1000 mg twice a day (as recommended by your GI specialist)  - try to avoid possible triggers as much as possible. Employ a healthy diet, avoid fasting, adequate fluid intake, regular exercise, avoid sleep disruptions, and sleep hygiene.   - keep a detailed journal/diary of things you encountered the day before/of the episode. Including diet, activities, and possible triggers.        If you experience another episode, please follow the below instructions:   - At the onset of symptoms, take Imitrex nasal spray along with Phenergan or Zofran.   - if symptoms persist after 2 hours, (and you did not experience any side effects to the initial Imitrex dose) you may repeat Imitrex nasal spray another time.   - you may also repeat phenergan or zofran as directed  - if symptoms continue to persist, you may now try tylenol 1,000mg if you wish. This may be repeated every 6 hours as needed if symptoms persist.    Common side effects with Imitrex are as follows, please stop using this if you experience any of the following or any other symptoms with this medication: chest tightness, palpitations, shortness of breath, nausea, muscle tightness.   Do not take more than 6 doses in a week.   Be sure to read leaflet to administer correctly, or ask pharmacist at your pharmacy for instructions on correct administration as this can be tricky.       Once the vomiting phase has stopped, it is important to increase your fluid intake and to gradually increase your food intake as tolerated.   If these strategies do not work, we may likely discuss a daily regimen to assist in these episodes in the future such as Elavil.

## 2020-11-20 NOTE — PROGRESS NOTES
New Patient     The patient location is: home in [place]  The chief complaint leading to consultation is: headache     Visit type: audiovisual    Face to Face time with patient: 60 min  75 minutes of total time spent on the encounter, which includes face to face time and non-face to face time preparing to see the patient (eg, review of tests), Obtaining and/or reviewing separately obtained history, Documenting clinical information in the electronic or other health record, Independently interpreting results (not separately reported) and communicating results to the patient/family/caregiver, or Care coordination (not separately reported).     Each patient to whom he or she provides medical services by telemedicine is:  (1) informed of the relationship between the physician and patient and the respective role of any other health care provider with respect to management of the patient; and (2) notified that he or she may decline to receive medical services by telemedicine and may withdraw from such care at any time.    Notes:       SUBJECTIVE:  Patient ID: Arian Farris   MRN: [id number]  Referred By: Dr. Paul Breaux  Chief Complaint: Headache      History of Present Illness:   20 y.o. male with a PMHx of abdominal migraine, migraines, h/o multiple concussions, eczema, kidney stone, who presents to virtual visit alone for evaluation of headaches.   PMHx negative for TBI, Meningitis, Aneurysms, asthma, GI bleed, osteoporosis, CAD/MI, CVA/TIA, DM, cancer  Family Hx. Positive for mother with migraines    Patient has a h/o abdominal migraines since childhood. He has undergone EGD and colonscopy, subsequently had cholecystectomy before pt was dx with abdominal migraines. He then remained symptom free for 11 years until recently experiencing symptoms again, however now he has begun experiencing HA's as well. He reports episodes of feeling sweaty, increased salivation, and tachycardic. Then approximately 1hour later, he will begin  "to feel nauseous. He will then experience profuse vomiting for approximately 24 hours. He has needed to visit an ED or UC during each of these episodes. During the last year, it has occurred one day during June, August, and October. However, the years before that, it was occurring more frequently, every 2 months. In ED/UC, he is typically given IVF, haldol, IV phenergan, and/or IV tylenol. His symptoms usually resolve before being discharged home, however feels sluggish for the next few days. He will also experience abdominal pain during these episodes which he describes as "getting kicked in the stomach", in the center around his umbilicus region. He is also experiencing HA's 1-2/30 days per month. Duration is approximately 1 hour currently as they are easily relieved by excedrin. However, untreated they can last > 4 hours. They are mild to moderate in intensity (1-4/10). They are located in his bilateral forehead, temples, back of his head, and neck. It is described as a pounding/hammering pain. Previously, he experienced photophobia, nasuea, and vomiting with his HA's. However, he feels his HA's have improved in the last year.   Triggers to his HA's include sinuses, allergies, glasses, hunger, fatigue. He is often aggravated by lights, screen lights, bright rooms during his HA's.   He has been followed by GI, recently seen by Dr. Breaux who recommended neuro eval for better abortive strategy. He has tried elavil, zofran, and phenergan thus far.   He denies previous neurological evaluations or aura with his Ha's.       Treatments Tried   Elavil  zofran  phenergan    Social History  Alcohol - once a month  Smoke - denies  Recreational Drug Use- denies  Occupation - graduated    Current Medications:    Current Outpatient Medications:     amitriptyline (ELAVIL) 50 MG tablet, Take 1/2 tab at night x 1 week then 1 tab every night (Patient not taking: Reported on 10/10/2020), Disp: 30 tablet, Rfl: 5    colestipoL " (COLESTID) 1 gram Tab, Take 1 tablet (1 g total) by mouth 2 (two) times daily. Take as needed for diarrhea (Patient not taking: Reported on 8/14/2020), Disp: 60 tablet, Rfl: 3    ondansetron (ZOFRAN-ODT) 4 MG TbDL, Take 1 tablet (4 mg total) by mouth every 8 (eight) hours as needed., Disp: 20 tablet, Rfl: 3    SUMAtriptan (IMITREX) 5 mg/actuation nasal spray, Take at onset of migraine, can repeat in 2 hrs if needed.  No more than twice per day or 3 days/wk., Disp: 6 each, Rfl: 0    Review of Systems - as per HPI, otherwise a balanced 10 systems review is negative.    OBJECTIVE:  Vitals:  There were no vitals taken for this visit.    Physical Exam: certain limitations due to video visit platform, able to perform the following with the patient's assistance:  Constitutional: he appears well-developed and well-nourished. he is well groomed. NAD  HENT:    Head: Normocephalic and atraumatic,  Frontalis was NTTP, temporalis was TTP   Eyes: Conjunctivae and EOM are normal. Pupils are equal and round  Neck: Occiput and trapezius NTTP   Musculoskeletal: Normal range of motion.   Psychiatric: Normal mood and affect.  Abdomen: tender to light palpation above umbilicus     Neuro exam:    Mental status:  The patient is alert and oriented to person, place and time.  Language is intact and fluent  Remote and recent memory are intact  Normal attention and concentration  Mood is stable    Cranial Nerves:  Extraocular movements are intact and without nystagmus.    Facial movement is symmetric.  Facial sensation is intact.    Tongue in midline without fasciculation.   FROM of neck in all (6) directions without pain  Shoulder shrug symmetrical.    Coordination:     Finger to nose - normal and symmetric bilaterally   Heel to shin test - normal and symmetric bilaterally     Motor:  Normal muscle bulk and symmetry. No fasciculations were noted.   Tremor not apparent   Pronator drift not apparent.                          Sensory:  RUE   intact light touch  LUE intact light touch  RLE intact light touch  LLE intact light touch    Gait:   Normal gait  Tandem, Heel, and Toe Walk - able to perform without difficulty    Review of Data:   Notes from GI reviewed   Labs:  No visits with results within 3 Month(s) from this visit.   Latest known visit with results is:   Admission on 08/14/2020, Discharged on 08/14/2020   Component Date Value Ref Range Status    WBC 08/14/2020 10.80  3.90 - 12.70 K/uL Final    RBC 08/14/2020 5.27  4.60 - 6.20 M/uL Final    Hemoglobin 08/14/2020 15.4  14.0 - 18.0 g/dL Final    Hematocrit 08/14/2020 44.7  40.0 - 54.0 % Final    MCV 08/14/2020 85  82 - 98 fL Final    MCH 08/14/2020 29.2  27.0 - 31.0 pg Final    MCHC 08/14/2020 34.5  32.0 - 36.0 g/dL Final    RDW 08/14/2020 12.1  11.5 - 14.5 % Final    Platelets 08/14/2020 165  150 - 350 K/uL Final    MPV 08/14/2020 9.5  9.2 - 12.9 fL Final    Immature Granulocytes 08/14/2020 0.4  0.0 - 0.5 % Final    Gran # (ANC) 08/14/2020 9.5* 1.8 - 7.7 K/uL Final    Immature Grans (Abs) 08/14/2020 0.04  0.00 - 0.04 K/uL Final    Lymph # 08/14/2020 0.8* 1.0 - 4.8 K/uL Final    Mono # 08/14/2020 0.4  0.3 - 1.0 K/uL Final    Eos # 08/14/2020 0.0  0.0 - 0.5 K/uL Final    Baso # 08/14/2020 0.03  0.00 - 0.20 K/uL Final    nRBC 08/14/2020 0  0 /100 WBC Final    Gran % 08/14/2020 87.8* 38.0 - 73.0 % Final    Lymph % 08/14/2020 7.8* 18.0 - 48.0 % Final    Mono % 08/14/2020 3.7* 4.0 - 15.0 % Final    Eosinophil % 08/14/2020 0.0  0.0 - 8.0 % Final    Basophil % 08/14/2020 0.3  0.0 - 1.9 % Final    Differential Method 08/14/2020 Automated   Final    Sodium 08/14/2020 140  136 - 145 mmol/L Final    Potassium 08/14/2020 3.8  3.5 - 5.1 mmol/L Final    Chloride 08/14/2020 105  95 - 110 mmol/L Final    CO2 08/14/2020 21* 23 - 29 mmol/L Final    Glucose 08/14/2020 136* 70 - 110 mg/dL Final    BUN 08/14/2020 16  6 - 20 mg/dL Final    Creatinine 08/14/2020 1.2  0.5 - 1.4 mg/dL  Final    Calcium 08/14/2020 10.2  8.7 - 10.5 mg/dL Final    Total Protein 08/14/2020 7.6  6.0 - 8.4 g/dL Final    Albumin 08/14/2020 5.2  3.5 - 5.2 g/dL Final    Total Bilirubin 08/14/2020 1.3* 0.1 - 1.0 mg/dL Final    Alkaline Phosphatase 08/14/2020 100  55 - 135 U/L Final    AST 08/14/2020 24  10 - 40 U/L Final    ALT 08/14/2020 16  10 - 44 U/L Final    Anion Gap 08/14/2020 14  8 - 16 mmol/L Final    eGFR if African American 08/14/2020 >60.0  >60 mL/min/1.73 m^2 Final    eGFR if non African American 08/14/2020 >60.0  >60 mL/min/1.73 m^2 Final    Lipase 08/14/2020 8  4 - 60 U/L Final    Specimen UA 08/14/2020 Urine, Clean Catch   Final    Color, UA 08/14/2020 Yellow  Yellow, Straw, Anaid Final    Appearance, UA 08/14/2020 Cloudy* Clear Final    pH, UA 08/14/2020 7.0  5.0 - 8.0 Final    Specific [place], UA 08/14/2020 1.030  1.005 - 1.030 Final    Protein, UA 08/14/2020 2+* Negative Final    Glucose, UA 08/14/2020 Negative  Negative Final    Ketones, UA 08/14/2020 3+* Negative Final    Bilirubin (UA) 08/14/2020 Negative  Negative Final    Occult Blood UA 08/14/2020 Negative  Negative Final    Nitrite, UA 08/14/2020 Negative  Negative Final    Leukocytes, UA 08/14/2020 Negative  Negative Final    Magnesium 08/14/2020 1.8  1.6 - 2.6 mg/dL Final    RBC, UA 08/14/2020 0  0 - 4 /hpf Final    WBC, UA 08/14/2020 0  0 - 5 /hpf Final    Bacteria 08/14/2020 Rare  None-Occ /hpf Final    Hyaline Casts, UA 08/14/2020 0  0-1/lpf /lpf Final    Amorphous, UA 08/14/2020 Many* None-Moderate Final    Microscopic Comment 08/14/2020 SEE COMMENT   Final     Imaging:  No results found for this or any previous visit.  Note: I have independently reviewed any/all imaging/labs/tests and agree with the report (s) as documented.  Any discrepancies will be as noted/demarcated by free text.  KOSTAS HARMON 11/23/2020    ASSESSMENT:  1. Migraine without aura and without status migrainosus, not intractable    2. Migraine  equivalent    3. Nonintractable episodic headache, unspecified headache type    4. Cyclical vomiting    5. Abdominal migraine, not intractable          PLAN:  - Discussed symptoms appear to be consistent with abdominal migraines. Discussed this with patient along with treatment options and patient agreed with the following plan  - abortive - continue phenergan (ask Dr. Breaux to consider increasing to 25mg and/or suppository at next visit), trial of imitrex NS, if fails he may try tylenol 1000mg (as this worked at previous  visit)  - risks, benefits, and potential side effects of imitrex, tylenol, phenergan, zofran discussed   - alternative treatment options offered   - discussed the importance of healthy diet, regular exercise and sleep hygiene in the treatment of headaches    - Start tracking headaches via Migraine Dong sailaja on phone   - RTC PRN     Orders Placed This Encounter    SUMAtriptan (IMITREX) 5 mg/actuation nasal spray       I have discussed realistic goals of care with patient at length as well as medication options, and need for lifestyle adjustment. I have explained that treatment will take time. We have agreed that the goal will be to reduce frequency/intensity/quantity of HA, not to be completely HA free. I have explained my non narcotic policy regarding headache treatment.    Patient agreeable to work on lifestyle adjustments.    Questions and concerns were sought and answered to the patient's stated verbal satisfaction.  The patient verbalizes understanding and agreement with the above stated treatment plan.     CC: MD Evon Loo PA-C  [facility]  [phone]    Dr. Castro was available during today's encounter.

## 2020-11-20 NOTE — TELEPHONE ENCOUNTER
Called and scheduled vv appt with pt. Pt confirmed appt. Walked pt through vv process.Pt voiced understanding.

## 2020-11-20 NOTE — LETTER
November 23, 2020      Paul Breaux MD  [address]  [facility] [address]           [facility]  [address]  [facility] [address]  Phone: [phone]  Fax: [phone]          Patient: Arian Farris   MR Number: [id number]   YOB: 2000   Date of Visit: 11/20/2020       Dear Dr. Paul Breaux:    Thank you for referring Arian Farris to me for evaluation. Attached you will find relevant portions of my assessment and plan of care.    If you have questions, please do not hesitate to call me. I look forward to following Arian Farris along with you.    Sincerely,    Evon Winters PA-C    Enclosure  CC:  No Recipients    If you would like to receive this communication electronically, please contact [email or web address] or [phone] to request more information on [facility] Link access.    For providers and/or their staff who would like to refer a patient to [facility], please contact us through our one-stop-shop provider referral line, [facility], at [phone].    If you feel you have received this communication in error or would no longer like to receive these types of communications, please e-mail [email or web address]

## 2020-11-20 NOTE — LETTER
November 20, 2020      THIERRY Jo MD  [address] [facility] [place] [address]           [facility] Gastroenterology  [address] [facility] [address]  Phone: [phone]  Fax: [phone]          Patient: Arian Farris   MR Number: [id number]   YOB: 2000   Date of Visit: 11/20/2020       Dear Dr. THIERRY Jo:    Thank you for referring Arian Farris to me for evaluation. Attached you will find relevant portions of my assessment and plan of care.    If you have questions, please do not hesitate to call me. I look forward to following Arian Farris along with you.    Sincerely,    Paul Breaux MD    Enclosure  CC:  No Recipients    If you would like to receive this communication electronically, please contact [email or web address] or [phone] to request more information on [facility] Link access.    For providers and/or their staff who would like to refer a patient to [facility], please contact us through our one-stop-shop provider referral line, [facility], at [phone].    If you feel you have received this communication in error or would no longer like to receive these types of communications, please e-mail [email or web address]

## 2020-11-23 ENCOUNTER — PATIENT MESSAGE (OUTPATIENT)
Dept: INTERNAL MEDICINE | Facility: CLINIC | Age: 20
End: 2020-11-23

## 2020-11-23 RX ORDER — SUMATRIPTAN SUCCINATE 25 MG/1
TABLET ORAL
Qty: 12 TABLET | Refills: 2 | Status: SHIPPED | OUTPATIENT
Start: 2020-11-23 | End: 2020-11-23 | Stop reason: CLARIF

## 2020-11-23 RX ORDER — SUMATRIPTAN 5 MG/1
SPRAY NASAL
Qty: 6 EACH | Refills: 0 | Status: SHIPPED | OUTPATIENT
Start: 2020-11-23 | End: 2020-12-18

## 2020-11-25 ENCOUNTER — PATIENT MESSAGE (OUTPATIENT)
Dept: GASTROENTEROLOGY | Facility: CLINIC | Age: 20
End: 2020-11-25

## 2020-11-25 ENCOUNTER — PATIENT MESSAGE (OUTPATIENT)
Dept: NEUROLOGY | Facility: CLINIC | Age: 20
End: 2020-11-25

## 2021-01-27 ENCOUNTER — PATIENT MESSAGE (OUTPATIENT)
Dept: INTERNAL MEDICINE | Facility: CLINIC | Age: 21
End: 2021-01-27

## 2021-01-27 ENCOUNTER — LAB VISIT (OUTPATIENT)
Dept: INTERNAL MEDICINE | Facility: CLINIC | Age: 21
End: 2021-01-27
Payer: COMMERCIAL

## 2021-01-27 ENCOUNTER — OFFICE VISIT (OUTPATIENT)
Dept: INTERNAL MEDICINE | Facility: CLINIC | Age: 21
End: 2021-01-27
Payer: COMMERCIAL

## 2021-01-27 DIAGNOSIS — R05.9 COUGH: Primary | ICD-10-CM

## 2021-01-27 DIAGNOSIS — J32.9 BACTERIAL SINUSITIS: ICD-10-CM

## 2021-01-27 DIAGNOSIS — J02.9 SORE THROAT: ICD-10-CM

## 2021-01-27 DIAGNOSIS — B96.89 BACTERIAL SINUSITIS: ICD-10-CM

## 2021-01-27 DIAGNOSIS — R05.9 COUGH: ICD-10-CM

## 2021-01-27 PROCEDURE — 99214 PR OFFICE/OUTPT VISIT, EST, LEVL IV, 30-39 MIN: ICD-10-PCS | Mod: 95,,, | Performed by: INTERNAL MEDICINE

## 2021-01-27 PROCEDURE — 99214 OFFICE O/P EST MOD 30 MIN: CPT | Mod: 95,,, | Performed by: INTERNAL MEDICINE

## 2021-01-27 PROCEDURE — U0003 INFECTIOUS AGENT DETECTION BY NUCLEIC ACID (DNA OR RNA); SEVERE ACUTE RESPIRATORY SYNDROME CORONAVIRUS 2 (SARS-COV-2) (CORONAVIRUS DISEASE [COVID-19]), AMPLIFIED PROBE TECHNIQUE, MAKING USE OF HIGH THROUGHPUT TECHNOLOGIES AS DESCRIBED BY CMS-2020-01-R: HCPCS

## 2021-01-27 RX ORDER — LEVOCETIRIZINE DIHYDROCHLORIDE 5 MG/1
5 TABLET, FILM COATED ORAL NIGHTLY
Qty: 30 TABLET | Refills: 0 | Status: SHIPPED | OUTPATIENT
Start: 2021-01-27 | End: 2021-01-28

## 2021-01-27 RX ORDER — AMOXICILLIN AND CLAVULANATE POTASSIUM 875; 125 MG/1; MG/1
1 TABLET, FILM COATED ORAL 2 TIMES DAILY
Qty: 14 TABLET | Refills: 0 | Status: SHIPPED | OUTPATIENT
Start: 2021-01-27 | End: 2021-02-03

## 2021-01-28 LAB — SARS-COV-2 RNA RESP QL NAA+PROBE: NOT DETECTED

## 2021-02-19 ENCOUNTER — TELEPHONE (OUTPATIENT)
Dept: INTERNAL MEDICINE | Facility: CLINIC | Age: 21
End: 2021-02-19

## 2021-02-19 ENCOUNTER — OFFICE VISIT (OUTPATIENT)
Dept: URGENT CARE | Facility: CLINIC | Age: 21
End: 2021-02-19
Payer: COMMERCIAL

## 2021-02-19 VITALS
WEIGHT: 165 LBS | HEART RATE: 109 BPM | OXYGEN SATURATION: 97 % | DIASTOLIC BLOOD PRESSURE: 95 MMHG | RESPIRATION RATE: 18 BRPM | SYSTOLIC BLOOD PRESSURE: 132 MMHG | BODY MASS INDEX: 22.35 KG/M2 | TEMPERATURE: 98 F | HEIGHT: 72 IN

## 2021-02-19 DIAGNOSIS — U07.1 COVID-19 VIRUS DETECTED: ICD-10-CM

## 2021-02-19 DIAGNOSIS — U07.1 COVID-19 VIRUS INFECTION: Primary | ICD-10-CM

## 2021-02-19 DIAGNOSIS — J02.9 SORE THROAT: ICD-10-CM

## 2021-02-19 LAB
CTP QC/QA: YES
SARS-COV-2 RDRP RESP QL NAA+PROBE: POSITIVE

## 2021-02-19 PROCEDURE — U0002 COVID-19 LAB TEST NON-CDC: HCPCS | Mod: QW,S$GLB,, | Performed by: NURSE PRACTITIONER

## 2021-02-19 PROCEDURE — 3008F BODY MASS INDEX DOCD: CPT | Mod: CPTII,S$GLB,, | Performed by: NURSE PRACTITIONER

## 2021-02-19 PROCEDURE — U0002: ICD-10-PCS | Mod: QW,S$GLB,, | Performed by: NURSE PRACTITIONER

## 2021-02-19 PROCEDURE — 99214 PR OFFICE/OUTPT VISIT, EST, LEVL IV, 30-39 MIN: ICD-10-PCS | Mod: S$GLB,,, | Performed by: NURSE PRACTITIONER

## 2021-02-19 PROCEDURE — 3008F PR BODY MASS INDEX (BMI) DOCUMENTED: ICD-10-PCS | Mod: CPTII,S$GLB,, | Performed by: NURSE PRACTITIONER

## 2021-02-19 PROCEDURE — 99214 OFFICE O/P EST MOD 30 MIN: CPT | Mod: S$GLB,,, | Performed by: NURSE PRACTITIONER

## 2021-03-12 ENCOUNTER — LAB VISIT (OUTPATIENT)
Dept: LAB | Facility: HOSPITAL | Age: 21
End: 2021-03-12
Attending: FAMILY MEDICINE
Payer: COMMERCIAL

## 2021-03-12 ENCOUNTER — OFFICE VISIT (OUTPATIENT)
Dept: INTERNAL MEDICINE | Facility: CLINIC | Age: 21
End: 2021-03-12
Payer: COMMERCIAL

## 2021-03-12 VITALS
BODY MASS INDEX: 21.47 KG/M2 | WEIGHT: 158.5 LBS | TEMPERATURE: 99 F | OXYGEN SATURATION: 98 % | HEART RATE: 95 BPM | SYSTOLIC BLOOD PRESSURE: 146 MMHG | HEIGHT: 72 IN | DIASTOLIC BLOOD PRESSURE: 92 MMHG

## 2021-03-12 DIAGNOSIS — I10 ESSENTIAL HYPERTENSION: Chronic | ICD-10-CM

## 2021-03-12 DIAGNOSIS — Z13.220 SCREENING FOR LIPID DISORDERS: ICD-10-CM

## 2021-03-12 DIAGNOSIS — E80.6 HYPERBILIRUBINEMIA: ICD-10-CM

## 2021-03-12 DIAGNOSIS — Z13.1 SCREENING FOR DIABETES MELLITUS: ICD-10-CM

## 2021-03-12 DIAGNOSIS — Z23 NEED FOR TD VACCINE: ICD-10-CM

## 2021-03-12 DIAGNOSIS — Z00.00 PREVENTATIVE HEALTH CARE: Primary | ICD-10-CM

## 2021-03-12 DIAGNOSIS — Z11.4 SCREENING FOR HIV WITHOUT PRESENCE OF RISK FACTORS: ICD-10-CM

## 2021-03-12 DIAGNOSIS — Z00.00 PREVENTATIVE HEALTH CARE: ICD-10-CM

## 2021-03-12 DIAGNOSIS — Z23 NEED FOR HPV VACCINATION: ICD-10-CM

## 2021-03-12 PROBLEM — E27.49 CORTISOL DEFICIENCY: Status: RESOLVED | Noted: 2018-07-23 | Resolved: 2021-03-12

## 2021-03-12 PROBLEM — R68.89 ABNORMAL ENDOCRINE LABORATORY TEST FINDING: Status: RESOLVED | Noted: 2018-07-28 | Resolved: 2021-03-12

## 2021-03-12 PROBLEM — R10.9 ABDOMINAL PAIN: Status: RESOLVED | Noted: 2017-12-22 | Resolved: 2021-03-12

## 2021-03-12 PROBLEM — R74.8 ELEVATED ALKALINE PHOSPHATASE LEVEL: Status: RESOLVED | Noted: 2019-11-25 | Resolved: 2021-03-12

## 2021-03-12 PROCEDURE — 3008F PR BODY MASS INDEX (BMI) DOCUMENTED: ICD-10-PCS | Mod: CPTII,S$GLB,, | Performed by: FAMILY MEDICINE

## 2021-03-12 PROCEDURE — 1126F AMNT PAIN NOTED NONE PRSNT: CPT | Mod: S$GLB,,, | Performed by: FAMILY MEDICINE

## 2021-03-12 PROCEDURE — 36415 COLL VENOUS BLD VENIPUNCTURE: CPT | Performed by: FAMILY MEDICINE

## 2021-03-12 PROCEDURE — 99395 PR PREVENTIVE VISIT,EST,18-39: ICD-10-PCS | Mod: 25,S$GLB,, | Performed by: FAMILY MEDICINE

## 2021-03-12 PROCEDURE — 90651 HPV VACCINE 9-VALENT 3 DOSE IM: ICD-10-PCS | Mod: S$GLB,,, | Performed by: FAMILY MEDICINE

## 2021-03-12 PROCEDURE — 1126F PR PAIN SEVERITY QUANTIFIED, NO PAIN PRESENT: ICD-10-PCS | Mod: S$GLB,,, | Performed by: FAMILY MEDICINE

## 2021-03-12 PROCEDURE — 99999 PR PBB SHADOW E&M-EST. PATIENT-LVL IV: CPT | Mod: PBBFAC,,, | Performed by: FAMILY MEDICINE

## 2021-03-12 PROCEDURE — 80061 LIPID PANEL: CPT | Performed by: FAMILY MEDICINE

## 2021-03-12 PROCEDURE — 3078F DIAST BP <80 MM HG: CPT | Mod: CPTII,S$GLB,, | Performed by: FAMILY MEDICINE

## 2021-03-12 PROCEDURE — 90471 IMMUNIZATION ADMIN: CPT | Mod: S$GLB,,, | Performed by: FAMILY MEDICINE

## 2021-03-12 PROCEDURE — 80076 HEPATIC FUNCTION PANEL: CPT | Performed by: FAMILY MEDICINE

## 2021-03-12 PROCEDURE — 86703 HIV-1/HIV-2 1 RESULT ANTBDY: CPT | Performed by: FAMILY MEDICINE

## 2021-03-12 PROCEDURE — 3008F BODY MASS INDEX DOCD: CPT | Mod: CPTII,S$GLB,, | Performed by: FAMILY MEDICINE

## 2021-03-12 PROCEDURE — 90651 9VHPV VACCINE 2/3 DOSE IM: CPT | Mod: S$GLB,,, | Performed by: FAMILY MEDICINE

## 2021-03-12 PROCEDURE — 90471 HPV VACCINE 9-VALENT 3 DOSE IM: ICD-10-PCS | Mod: S$GLB,,, | Performed by: FAMILY MEDICINE

## 2021-03-12 PROCEDURE — 80048 BASIC METABOLIC PNL TOTAL CA: CPT | Performed by: FAMILY MEDICINE

## 2021-03-12 PROCEDURE — 3074F SYST BP LT 130 MM HG: CPT | Mod: CPTII,S$GLB,, | Performed by: FAMILY MEDICINE

## 2021-03-12 PROCEDURE — 99999 PR PBB SHADOW E&M-EST. PATIENT-LVL IV: ICD-10-PCS | Mod: PBBFAC,,, | Performed by: FAMILY MEDICINE

## 2021-03-12 PROCEDURE — 99395 PREV VISIT EST AGE 18-39: CPT | Mod: 25,S$GLB,, | Performed by: FAMILY MEDICINE

## 2021-03-12 PROCEDURE — 3074F PR MOST RECENT SYSTOLIC BLOOD PRESSURE < 130 MM HG: ICD-10-PCS | Mod: CPTII,S$GLB,, | Performed by: FAMILY MEDICINE

## 2021-03-12 PROCEDURE — 3078F PR MOST RECENT DIASTOLIC BLOOD PRESSURE < 80 MM HG: ICD-10-PCS | Mod: CPTII,S$GLB,, | Performed by: FAMILY MEDICINE

## 2021-03-12 RX ORDER — POTASSIUM CHLORIDE 750 MG/1
10 TABLET, EXTENDED RELEASE ORAL NIGHTLY
Qty: 90 TABLET | Refills: 0 | Status: SHIPPED | OUTPATIENT
Start: 2021-03-12 | End: 2021-06-04

## 2021-03-12 RX ORDER — CHLORTHALIDONE 25 MG/1
25 TABLET ORAL NIGHTLY
Qty: 30 TABLET | Refills: 1 | Status: SHIPPED | OUTPATIENT
Start: 2021-03-12 | End: 2021-03-26 | Stop reason: ALTCHOICE

## 2021-03-13 LAB
ALBUMIN SERPL BCP-MCNC: 4.9 G/DL (ref 3.5–5.2)
ALP SERPL-CCNC: 99 U/L (ref 55–135)
ALT SERPL W/O P-5'-P-CCNC: 16 U/L (ref 10–44)
ANION GAP SERPL CALC-SCNC: 9 MMOL/L (ref 8–16)
AST SERPL-CCNC: 23 U/L (ref 10–40)
BILIRUB DIRECT SERPL-MCNC: 0.3 MG/DL (ref 0.1–0.3)
BILIRUB SERPL-MCNC: 0.7 MG/DL (ref 0.1–1)
BUN SERPL-MCNC: 15 MG/DL (ref 6–20)
CALCIUM SERPL-MCNC: 9.8 MG/DL (ref 8.7–10.5)
CHLORIDE SERPL-SCNC: 106 MMOL/L (ref 95–110)
CHOLEST SERPL-MCNC: 155 MG/DL (ref 120–199)
CHOLEST/HDLC SERPL: 2.7 {RATIO} (ref 2–5)
CO2 SERPL-SCNC: 25 MMOL/L (ref 23–29)
CREAT SERPL-MCNC: 1.1 MG/DL (ref 0.5–1.4)
EST. GFR  (AFRICAN AMERICAN): >60 ML/MIN/1.73 M^2
EST. GFR  (NON AFRICAN AMERICAN): >60 ML/MIN/1.73 M^2
GLUCOSE SERPL-MCNC: 79 MG/DL (ref 70–110)
HDLC SERPL-MCNC: 58 MG/DL (ref 40–75)
HDLC SERPL: 37.4 % (ref 20–50)
LDLC SERPL CALC-MCNC: 82 MG/DL (ref 63–159)
NONHDLC SERPL-MCNC: 97 MG/DL
POTASSIUM SERPL-SCNC: 4 MMOL/L (ref 3.5–5.1)
PROT SERPL-MCNC: 7.4 G/DL (ref 6–8.4)
SODIUM SERPL-SCNC: 140 MMOL/L (ref 136–145)
TRIGL SERPL-MCNC: 75 MG/DL (ref 30–150)

## 2021-03-15 LAB — HIV 1+2 AB+HIV1 P24 AG SERPL QL IA: NEGATIVE

## 2021-03-26 ENCOUNTER — CLINICAL SUPPORT (OUTPATIENT)
Dept: INTERNAL MEDICINE | Facility: CLINIC | Age: 21
End: 2021-03-26
Payer: COMMERCIAL

## 2021-03-26 ENCOUNTER — TELEPHONE (OUTPATIENT)
Dept: INTERNAL MEDICINE | Facility: CLINIC | Age: 21
End: 2021-03-26

## 2021-03-26 VITALS — DIASTOLIC BLOOD PRESSURE: 90 MMHG | SYSTOLIC BLOOD PRESSURE: 122 MMHG

## 2021-03-26 DIAGNOSIS — I10 ESSENTIAL HYPERTENSION: Primary | ICD-10-CM

## 2021-03-26 PROCEDURE — 99999 PR PBB SHADOW E&M-EST. PATIENT-LVL II: ICD-10-PCS | Mod: PBBFAC,,,

## 2021-03-26 PROCEDURE — 99999 PR PBB SHADOW E&M-EST. PATIENT-LVL II: CPT | Mod: PBBFAC,,,

## 2021-03-26 RX ORDER — LOSARTAN POTASSIUM AND HYDROCHLOROTHIAZIDE 12.5; 5 MG/1; MG/1
1 TABLET ORAL DAILY
Qty: 30 TABLET | Refills: 0 | Status: SHIPPED | OUTPATIENT
Start: 2021-03-26 | End: 2021-04-12 | Stop reason: SDUPTHER

## 2021-03-27 ENCOUNTER — OFFICE VISIT (OUTPATIENT)
Dept: URGENT CARE | Facility: CLINIC | Age: 21
End: 2021-03-27
Payer: COMMERCIAL

## 2021-03-27 VITALS
WEIGHT: 158 LBS | DIASTOLIC BLOOD PRESSURE: 83 MMHG | HEIGHT: 72 IN | HEART RATE: 120 BPM | SYSTOLIC BLOOD PRESSURE: 119 MMHG | RESPIRATION RATE: 16 BRPM | TEMPERATURE: 97 F | OXYGEN SATURATION: 99 % | BODY MASS INDEX: 21.4 KG/M2

## 2021-03-27 DIAGNOSIS — R11.10 INTRACTABLE VOMITING, PRESENCE OF NAUSEA NOT SPECIFIED, UNSPECIFIED VOMITING TYPE: Primary | ICD-10-CM

## 2021-03-27 LAB
GLUCOSE SERPL-MCNC: 149 MG/DL (ref 70–110)
POC ANION GAP: 16 MMOL/L (ref 10–20)
POC BUN: 16 MMOL/L (ref 8–26)
POC CHLORIDE: 97 MMOL/L (ref 98–109)
POC CREATININE: 1.1 MG/DL (ref 0.6–1.3)
POC HEMATOCRIT: 45 %PCV (ref 42–52)
POC HEMOGLOBIN: 15.3 G/DL (ref 13.5–18)
POC ICA: 0.99 MMOL/L (ref 1.12–1.32)
POC POTASSIUM: 3.2 MMOL/L (ref 3.5–4.9)
POC SODIUM: 137 MMOL/L (ref 138–146)
POC TCO2: 28 MMOL/L (ref 24–29)

## 2021-03-27 PROCEDURE — 74019 RADEX ABDOMEN 2 VIEWS: CPT | Mod: FY,S$GLB,, | Performed by: RADIOLOGY

## 2021-03-27 PROCEDURE — 96372 PR INJECTION,THERAP/PROPH/DIAG2ST, IM OR SUBCUT: ICD-10-PCS | Mod: S$GLB,,, | Performed by: FAMILY MEDICINE

## 2021-03-27 PROCEDURE — 96372 THER/PROPH/DIAG INJ SC/IM: CPT | Mod: S$GLB,,, | Performed by: FAMILY MEDICINE

## 2021-03-27 PROCEDURE — 74019 XR ABDOMEN FLAT AND ERECT: ICD-10-PCS | Mod: FY,S$GLB,, | Performed by: RADIOLOGY

## 2021-03-27 PROCEDURE — 99214 PR OFFICE/OUTPT VISIT, EST, LEVL IV, 30-39 MIN: ICD-10-PCS | Mod: 25,S$GLB,, | Performed by: FAMILY MEDICINE

## 2021-03-27 PROCEDURE — 80047 POCT CHEMISTRY PANEL: ICD-10-PCS | Mod: QW,S$GLB,, | Performed by: FAMILY MEDICINE

## 2021-03-27 PROCEDURE — 3008F BODY MASS INDEX DOCD: CPT | Mod: CPTII,S$GLB,, | Performed by: FAMILY MEDICINE

## 2021-03-27 PROCEDURE — 3008F PR BODY MASS INDEX (BMI) DOCUMENTED: ICD-10-PCS | Mod: CPTII,S$GLB,, | Performed by: FAMILY MEDICINE

## 2021-03-27 PROCEDURE — 80047 BASIC METABLC PNL IONIZED CA: CPT | Mod: QW,S$GLB,, | Performed by: FAMILY MEDICINE

## 2021-03-27 PROCEDURE — 99214 OFFICE O/P EST MOD 30 MIN: CPT | Mod: 25,S$GLB,, | Performed by: FAMILY MEDICINE

## 2021-03-27 RX ORDER — PROMETHAZINE HYDROCHLORIDE 25 MG/ML
25 INJECTION, SOLUTION INTRAMUSCULAR; INTRAVENOUS
Status: COMPLETED | OUTPATIENT
Start: 2021-03-27 | End: 2021-03-27

## 2021-03-27 RX ORDER — PROMETHAZINE HYDROCHLORIDE AND DEXTROMETHORPHAN HYDROBROMIDE 6.25; 15 MG/5ML; MG/5ML
SYRUP ORAL
Qty: 180 ML | Refills: 0 | Status: SHIPPED | OUTPATIENT
Start: 2021-03-27 | End: 2021-03-27 | Stop reason: SDUPTHER

## 2021-03-27 RX ORDER — PROMETHAZINE HYDROCHLORIDE 25 MG/1
25 TABLET ORAL EVERY 6 HOURS PRN
Qty: 6 TABLET | Refills: 0 | Status: SHIPPED | OUTPATIENT
Start: 2021-03-27 | End: 2022-09-01

## 2021-03-27 RX ORDER — SODIUM CHLORIDE 9 MG/ML
INJECTION, SOLUTION INTRAVENOUS
Status: COMPLETED | OUTPATIENT
Start: 2021-03-27 | End: 2021-03-27

## 2021-03-27 RX ADMIN — SODIUM CHLORIDE: 9 INJECTION, SOLUTION INTRAVENOUS at 10:03

## 2021-03-27 RX ADMIN — PROMETHAZINE HYDROCHLORIDE 25 MG: 25 INJECTION, SOLUTION INTRAMUSCULAR; INTRAVENOUS at 10:03

## 2021-04-06 DIAGNOSIS — I10 ESSENTIAL HYPERTENSION: Chronic | ICD-10-CM

## 2021-04-06 RX ORDER — CHLORTHALIDONE 25 MG/1
TABLET ORAL
Qty: 30 TABLET | Refills: 1 | OUTPATIENT
Start: 2021-04-06

## 2021-04-08 ENCOUNTER — HOSPITAL ENCOUNTER (EMERGENCY)
Facility: HOSPITAL | Age: 21
Discharge: LEFT BEFORE DISCHARGE | End: 2021-04-08
Attending: EMERGENCY MEDICINE
Payer: COMMERCIAL

## 2021-04-08 VITALS
TEMPERATURE: 98 F | RESPIRATION RATE: 18 BRPM | HEIGHT: 72 IN | SYSTOLIC BLOOD PRESSURE: 148 MMHG | OXYGEN SATURATION: 100 % | WEIGHT: 160 LBS | DIASTOLIC BLOOD PRESSURE: 79 MMHG | HEART RATE: 100 BPM | BODY MASS INDEX: 21.67 KG/M2

## 2021-04-08 DIAGNOSIS — R11.10 VOMITING: ICD-10-CM

## 2021-04-08 DIAGNOSIS — R10.84 GENERALIZED ABDOMINAL PAIN: Primary | ICD-10-CM

## 2021-04-08 LAB
ALBUMIN SERPL BCP-MCNC: 5 G/DL (ref 3.5–5.2)
ALP SERPL-CCNC: 92 U/L (ref 55–135)
ALT SERPL W/O P-5'-P-CCNC: 14 U/L (ref 10–44)
ANION GAP SERPL CALC-SCNC: 13 MMOL/L (ref 8–16)
AST SERPL-CCNC: 24 U/L (ref 10–40)
BASOPHILS # BLD AUTO: 0.03 K/UL (ref 0–0.2)
BASOPHILS NFR BLD: 0.4 % (ref 0–1.9)
BILIRUB SERPL-MCNC: 0.9 MG/DL (ref 0.1–1)
BUN SERPL-MCNC: 19 MG/DL (ref 6–20)
CALCIUM SERPL-MCNC: 10.3 MG/DL (ref 8.7–10.5)
CHLORIDE SERPL-SCNC: 101 MMOL/L (ref 95–110)
CO2 SERPL-SCNC: 24 MMOL/L (ref 23–29)
CREAT SERPL-MCNC: 1.2 MG/DL (ref 0.5–1.4)
DIFFERENTIAL METHOD: ABNORMAL
EOSINOPHIL # BLD AUTO: 0 K/UL (ref 0–0.5)
EOSINOPHIL NFR BLD: 0.4 % (ref 0–8)
ERYTHROCYTE [DISTWIDTH] IN BLOOD BY AUTOMATED COUNT: 12.6 % (ref 11.5–14.5)
EST. GFR  (AFRICAN AMERICAN): >60 ML/MIN/1.73 M^2
EST. GFR  (NON AFRICAN AMERICAN): >60 ML/MIN/1.73 M^2
GLUCOSE SERPL-MCNC: 154 MG/DL (ref 70–110)
HCT VFR BLD AUTO: 44.8 % (ref 40–54)
HGB BLD-MCNC: 16.1 G/DL (ref 14–18)
IMM GRANULOCYTES # BLD AUTO: 0.03 K/UL (ref 0–0.04)
IMM GRANULOCYTES NFR BLD AUTO: 0.4 % (ref 0–0.5)
LIPASE SERPL-CCNC: 15 U/L (ref 4–60)
LYMPHOCYTES # BLD AUTO: 1.2 K/UL (ref 1–4.8)
LYMPHOCYTES NFR BLD: 14.5 % (ref 18–48)
MAGNESIUM SERPL-MCNC: 2 MG/DL (ref 1.6–2.6)
MCH RBC QN AUTO: 29.7 PG (ref 27–31)
MCHC RBC AUTO-ENTMCNC: 35.9 G/DL (ref 32–36)
MCV RBC AUTO: 83 FL (ref 82–98)
MONOCYTES # BLD AUTO: 0.5 K/UL (ref 0.3–1)
MONOCYTES NFR BLD: 5.7 % (ref 4–15)
NEUTROPHILS # BLD AUTO: 6.3 K/UL (ref 1.8–7.7)
NEUTROPHILS NFR BLD: 78.6 % (ref 38–73)
NRBC BLD-RTO: 0 /100 WBC
PLATELET # BLD AUTO: 171 K/UL (ref 150–450)
PMV BLD AUTO: 9.3 FL (ref 9.2–12.9)
POTASSIUM SERPL-SCNC: 4.1 MMOL/L (ref 3.5–5.1)
PROT SERPL-MCNC: 7.6 G/DL (ref 6–8.4)
RBC # BLD AUTO: 5.42 M/UL (ref 4.6–6.2)
SODIUM SERPL-SCNC: 138 MMOL/L (ref 136–145)
WBC # BLD AUTO: 8.02 K/UL (ref 3.9–12.7)

## 2021-04-08 PROCEDURE — 99284 PR EMERGENCY DEPT VISIT,LEVEL IV: ICD-10-PCS | Mod: ,,, | Performed by: PHYSICIAN ASSISTANT

## 2021-04-08 PROCEDURE — 93010 EKG 12-LEAD: ICD-10-PCS | Mod: ,,, | Performed by: INTERNAL MEDICINE

## 2021-04-08 PROCEDURE — 96374 THER/PROPH/DIAG INJ IV PUSH: CPT

## 2021-04-08 PROCEDURE — 99284 EMERGENCY DEPT VISIT MOD MDM: CPT | Mod: 25

## 2021-04-08 PROCEDURE — 80053 COMPREHEN METABOLIC PANEL: CPT | Performed by: NURSE PRACTITIONER

## 2021-04-08 PROCEDURE — 25000003 PHARM REV CODE 250: Performed by: NURSE PRACTITIONER

## 2021-04-08 PROCEDURE — 93010 ELECTROCARDIOGRAM REPORT: CPT | Mod: ,,, | Performed by: INTERNAL MEDICINE

## 2021-04-08 PROCEDURE — 25000003 PHARM REV CODE 250: Performed by: PHYSICIAN ASSISTANT

## 2021-04-08 PROCEDURE — 83690 ASSAY OF LIPASE: CPT | Performed by: NURSE PRACTITIONER

## 2021-04-08 PROCEDURE — 99284 EMERGENCY DEPT VISIT MOD MDM: CPT | Mod: ,,, | Performed by: PHYSICIAN ASSISTANT

## 2021-04-08 PROCEDURE — 96361 HYDRATE IV INFUSION ADD-ON: CPT

## 2021-04-08 PROCEDURE — 63600175 PHARM REV CODE 636 W HCPCS: Performed by: PHYSICIAN ASSISTANT

## 2021-04-08 PROCEDURE — 85025 COMPLETE CBC W/AUTO DIFF WBC: CPT | Performed by: NURSE PRACTITIONER

## 2021-04-08 PROCEDURE — 83735 ASSAY OF MAGNESIUM: CPT | Performed by: NURSE PRACTITIONER

## 2021-04-08 PROCEDURE — 96375 TX/PRO/DX INJ NEW DRUG ADDON: CPT

## 2021-04-08 PROCEDURE — 93005 ELECTROCARDIOGRAM TRACING: CPT

## 2021-04-08 RX ORDER — DROPERIDOL 2.5 MG/ML
1.25 INJECTION, SOLUTION INTRAMUSCULAR; INTRAVENOUS
Status: COMPLETED | OUTPATIENT
Start: 2021-04-08 | End: 2021-04-08

## 2021-04-08 RX ADMIN — DROPERIDOL 1.25 MG: 2.5 INJECTION, SOLUTION INTRAMUSCULAR; INTRAVENOUS at 10:04

## 2021-04-08 RX ADMIN — PROMETHAZINE HYDROCHLORIDE 12.5 MG: 25 INJECTION INTRAMUSCULAR; INTRAVENOUS at 10:04

## 2021-04-08 RX ADMIN — SODIUM CHLORIDE 1000 ML: 0.9 INJECTION, SOLUTION INTRAVENOUS at 09:04

## 2021-04-09 ENCOUNTER — PATIENT MESSAGE (OUTPATIENT)
Dept: GASTROENTEROLOGY | Facility: CLINIC | Age: 21
End: 2021-04-09

## 2021-04-09 DIAGNOSIS — K92.0 HEMATEMESIS WITH NAUSEA: Primary | ICD-10-CM

## 2021-04-09 RX ORDER — OMEPRAZOLE 40 MG/1
40 CAPSULE, DELAYED RELEASE ORAL EVERY MORNING
Qty: 30 CAPSULE | Refills: 3 | Status: SHIPPED | OUTPATIENT
Start: 2021-04-09 | End: 2021-06-29 | Stop reason: SDUPTHER

## 2021-04-12 ENCOUNTER — PATIENT MESSAGE (OUTPATIENT)
Dept: ENDOSCOPY | Facility: HOSPITAL | Age: 21
End: 2021-04-12

## 2021-04-12 ENCOUNTER — CLINICAL SUPPORT (OUTPATIENT)
Dept: INTERNAL MEDICINE | Facility: CLINIC | Age: 21
End: 2021-04-12
Payer: COMMERCIAL

## 2021-04-12 ENCOUNTER — TELEPHONE (OUTPATIENT)
Dept: INTERNAL MEDICINE | Facility: CLINIC | Age: 21
End: 2021-04-12

## 2021-04-12 ENCOUNTER — LAB VISIT (OUTPATIENT)
Dept: LAB | Facility: HOSPITAL | Age: 21
End: 2021-04-12
Attending: FAMILY MEDICINE
Payer: COMMERCIAL

## 2021-04-12 VITALS — DIASTOLIC BLOOD PRESSURE: 82 MMHG | SYSTOLIC BLOOD PRESSURE: 122 MMHG

## 2021-04-12 DIAGNOSIS — I10 ESSENTIAL HYPERTENSION: ICD-10-CM

## 2021-04-12 DIAGNOSIS — I10 ESSENTIAL HYPERTENSION: Chronic | ICD-10-CM

## 2021-04-12 LAB — POTASSIUM SERPL-SCNC: 3.8 MMOL/L (ref 3.5–5.1)

## 2021-04-12 PROCEDURE — 36415 COLL VENOUS BLD VENIPUNCTURE: CPT | Performed by: FAMILY MEDICINE

## 2021-04-12 PROCEDURE — 84132 ASSAY OF SERUM POTASSIUM: CPT | Performed by: FAMILY MEDICINE

## 2021-04-12 RX ORDER — LOSARTAN POTASSIUM AND HYDROCHLOROTHIAZIDE 12.5; 5 MG/1; MG/1
1 TABLET ORAL DAILY
Qty: 90 TABLET | Refills: 3 | Status: SHIPPED | OUTPATIENT
Start: 2021-04-12 | End: 2021-04-22 | Stop reason: SINTOL

## 2021-04-13 ENCOUNTER — PATIENT MESSAGE (OUTPATIENT)
Dept: INTERNAL MEDICINE | Facility: CLINIC | Age: 21
End: 2021-04-13

## 2021-04-18 ENCOUNTER — HOSPITAL ENCOUNTER (EMERGENCY)
Facility: HOSPITAL | Age: 21
Discharge: HOME | End: 2021-04-19
Attending: EMERGENCY MEDICINE
Payer: COMMERCIAL

## 2021-04-18 DIAGNOSIS — E86.0 DEHYDRATION: ICD-10-CM

## 2021-04-18 DIAGNOSIS — R55 SYNCOPE: ICD-10-CM

## 2021-04-18 DIAGNOSIS — R11.15 CYCLICAL VOMITING SYNDROME: Primary | ICD-10-CM

## 2021-04-18 DIAGNOSIS — R11.2 NON-INTRACTABLE VOMITING WITH NAUSEA, UNSPECIFIED VOMITING TYPE: ICD-10-CM

## 2021-04-18 LAB
ALBUMIN SERPL BCP-MCNC: 5.3 G/DL (ref 3.5–5.2)
ALP SERPL-CCNC: 92 U/L (ref 55–135)
ALT SERPL W/O P-5'-P-CCNC: 17 U/L (ref 10–44)
ANION GAP SERPL CALC-SCNC: 14 MMOL/L (ref 8–16)
AST SERPL-CCNC: 21 U/L (ref 10–40)
BASOPHILS # BLD AUTO: 0.02 K/UL (ref 0–0.2)
BASOPHILS NFR BLD: 0.2 % (ref 0–1.9)
BILIRUB SERPL-MCNC: 0.6 MG/DL (ref 0.1–1)
BUN SERPL-MCNC: 11 MG/DL (ref 6–20)
CALCIUM SERPL-MCNC: 9.6 MG/DL (ref 8.7–10.5)
CHLORIDE SERPL-SCNC: 103 MMOL/L (ref 95–110)
CO2 SERPL-SCNC: 25 MMOL/L (ref 23–29)
CREAT SERPL-MCNC: 1.1 MG/DL (ref 0.5–1.4)
DIFFERENTIAL METHOD: ABNORMAL
EOSINOPHIL # BLD AUTO: 0 K/UL (ref 0–0.5)
EOSINOPHIL NFR BLD: 0.1 % (ref 0–8)
ERYTHROCYTE [DISTWIDTH] IN BLOOD BY AUTOMATED COUNT: 12.3 % (ref 11.5–14.5)
EST. GFR  (AFRICAN AMERICAN): >60 ML/MIN/1.73 M^2
EST. GFR  (NON AFRICAN AMERICAN): >60 ML/MIN/1.73 M^2
GLUCOSE SERPL-MCNC: 129 MG/DL (ref 70–110)
HCT VFR BLD AUTO: 45.1 % (ref 40–54)
HGB BLD-MCNC: 16.2 G/DL (ref 14–18)
IMM GRANULOCYTES # BLD AUTO: 0.05 K/UL (ref 0–0.04)
IMM GRANULOCYTES NFR BLD AUTO: 0.5 % (ref 0–0.5)
LIPASE SERPL-CCNC: 17 U/L (ref 4–60)
LYMPHOCYTES # BLD AUTO: 1.2 K/UL (ref 1–4.8)
LYMPHOCYTES NFR BLD: 13 % (ref 18–48)
MCH RBC QN AUTO: 29.5 PG (ref 27–31)
MCHC RBC AUTO-ENTMCNC: 35.9 G/DL (ref 32–36)
MCV RBC AUTO: 82 FL (ref 82–98)
MONOCYTES # BLD AUTO: 0.3 K/UL (ref 0.3–1)
MONOCYTES NFR BLD: 3.5 % (ref 4–15)
NEUTROPHILS # BLD AUTO: 7.8 K/UL (ref 1.8–7.7)
NEUTROPHILS NFR BLD: 82.7 % (ref 38–73)
NRBC BLD-RTO: 0 /100 WBC
PLATELET # BLD AUTO: 183 K/UL (ref 150–450)
PMV BLD AUTO: 8.7 FL (ref 9.2–12.9)
POTASSIUM SERPL-SCNC: 4.2 MMOL/L (ref 3.5–5.1)
PROT SERPL-MCNC: 7.7 G/DL (ref 6–8.4)
RBC # BLD AUTO: 5.5 M/UL (ref 4.6–6.2)
SODIUM SERPL-SCNC: 142 MMOL/L (ref 136–145)
WBC # BLD AUTO: 9.45 K/UL (ref 3.9–12.7)

## 2021-04-18 PROCEDURE — 83690 ASSAY OF LIPASE: CPT | Performed by: EMERGENCY MEDICINE

## 2021-04-18 PROCEDURE — 93005 ELECTROCARDIOGRAM TRACING: CPT

## 2021-04-18 PROCEDURE — 96361 HYDRATE IV INFUSION ADD-ON: CPT

## 2021-04-18 PROCEDURE — 93010 EKG 12-LEAD: ICD-10-PCS | Mod: ,,, | Performed by: INTERNAL MEDICINE

## 2021-04-18 PROCEDURE — 93010 ELECTROCARDIOGRAM REPORT: CPT | Mod: ,,, | Performed by: INTERNAL MEDICINE

## 2021-04-18 PROCEDURE — 96375 TX/PRO/DX INJ NEW DRUG ADDON: CPT

## 2021-04-18 PROCEDURE — 99285 EMERGENCY DEPT VISIT HI MDM: CPT | Mod: 25

## 2021-04-18 PROCEDURE — 80053 COMPREHEN METABOLIC PANEL: CPT | Performed by: EMERGENCY MEDICINE

## 2021-04-18 PROCEDURE — 25000003 PHARM REV CODE 250: Performed by: EMERGENCY MEDICINE

## 2021-04-18 PROCEDURE — 96374 THER/PROPH/DIAG INJ IV PUSH: CPT

## 2021-04-18 PROCEDURE — 85025 COMPLETE CBC W/AUTO DIFF WBC: CPT | Performed by: EMERGENCY MEDICINE

## 2021-04-18 PROCEDURE — 63600175 PHARM REV CODE 636 W HCPCS: Performed by: EMERGENCY MEDICINE

## 2021-04-18 RX ORDER — ONDANSETRON 2 MG/ML
4 INJECTION INTRAMUSCULAR; INTRAVENOUS
Status: COMPLETED | OUTPATIENT
Start: 2021-04-18 | End: 2021-04-18

## 2021-04-18 RX ORDER — HALOPERIDOL 5 MG/ML
5 INJECTION INTRAMUSCULAR
Status: COMPLETED | OUTPATIENT
Start: 2021-04-18 | End: 2021-04-18

## 2021-04-18 RX ADMIN — ONDANSETRON 4 MG: 2 INJECTION INTRAMUSCULAR; INTRAVENOUS at 11:04

## 2021-04-18 RX ADMIN — HALOPERIDOL LACTATE 5 MG: 5 INJECTION, SOLUTION INTRAMUSCULAR at 11:04

## 2021-04-18 RX ADMIN — SODIUM CHLORIDE 1000 ML: 0.9 INJECTION, SOLUTION INTRAVENOUS at 10:04

## 2021-04-19 VITALS
BODY MASS INDEX: 21.87 KG/M2 | SYSTOLIC BLOOD PRESSURE: 147 MMHG | DIASTOLIC BLOOD PRESSURE: 89 MMHG | HEART RATE: 102 BPM | TEMPERATURE: 98 F | WEIGHT: 165 LBS | OXYGEN SATURATION: 99 % | RESPIRATION RATE: 17 BRPM | HEIGHT: 73 IN

## 2021-04-19 PROCEDURE — 25000003 PHARM REV CODE 250: Performed by: EMERGENCY MEDICINE

## 2021-04-19 RX ADMIN — SODIUM CHLORIDE 1000 ML: 0.9 INJECTION, SOLUTION INTRAVENOUS at 12:04

## 2021-04-20 ENCOUNTER — PATIENT MESSAGE (OUTPATIENT)
Dept: GASTROENTEROLOGY | Facility: CLINIC | Age: 21
End: 2021-04-20

## 2021-04-21 ENCOUNTER — PATIENT OUTREACH (OUTPATIENT)
Dept: ADMINISTRATIVE | Facility: OTHER | Age: 21
End: 2021-04-21

## 2021-04-21 DIAGNOSIS — R55 SYNCOPE AND COLLAPSE: Primary | ICD-10-CM

## 2021-04-22 ENCOUNTER — HOSPITAL ENCOUNTER (OUTPATIENT)
Dept: CARDIOLOGY | Facility: CLINIC | Age: 21
Discharge: HOME | End: 2021-04-22
Payer: COMMERCIAL

## 2021-04-22 ENCOUNTER — OFFICE VISIT (OUTPATIENT)
Dept: CARDIOLOGY | Facility: CLINIC | Age: 21
End: 2021-04-22
Payer: COMMERCIAL

## 2021-04-22 VITALS
HEIGHT: 73 IN | HEART RATE: 97 BPM | SYSTOLIC BLOOD PRESSURE: 130 MMHG | DIASTOLIC BLOOD PRESSURE: 84 MMHG | BODY MASS INDEX: 20.07 KG/M2 | WEIGHT: 151.44 LBS

## 2021-04-22 DIAGNOSIS — R11.15 CYCLICAL VOMITING: ICD-10-CM

## 2021-04-22 DIAGNOSIS — I10 ESSENTIAL HYPERTENSION: Primary | ICD-10-CM

## 2021-04-22 DIAGNOSIS — R55 SYNCOPE AND COLLAPSE: ICD-10-CM

## 2021-04-22 PROCEDURE — 93000 ELECTROCARDIOGRAM COMPLETE: CPT | Mod: S$GLB,,, | Performed by: INTERNAL MEDICINE

## 2021-04-22 PROCEDURE — 3008F PR BODY MASS INDEX (BMI) DOCUMENTED: ICD-10-PCS | Mod: CPTII,S$GLB,, | Performed by: INTERNAL MEDICINE

## 2021-04-22 PROCEDURE — 99203 PR OFFICE/OUTPT VISIT, NEW, LEVL III, 30-44 MIN: ICD-10-PCS | Mod: 25,S$GLB,, | Performed by: INTERNAL MEDICINE

## 2021-04-22 PROCEDURE — 99999 PR PBB SHADOW E&M-EST. PATIENT-LVL IV: CPT | Mod: PBBFAC,,, | Performed by: INTERNAL MEDICINE

## 2021-04-22 PROCEDURE — 3075F SYST BP GE 130 - 139MM HG: CPT | Mod: CPTII,S$GLB,, | Performed by: INTERNAL MEDICINE

## 2021-04-22 PROCEDURE — 1126F AMNT PAIN NOTED NONE PRSNT: CPT | Mod: S$GLB,,, | Performed by: INTERNAL MEDICINE

## 2021-04-22 PROCEDURE — 99203 OFFICE O/P NEW LOW 30 MIN: CPT | Mod: 25,S$GLB,, | Performed by: INTERNAL MEDICINE

## 2021-04-22 PROCEDURE — 3079F PR MOST RECENT DIASTOLIC BLOOD PRESSURE 80-89 MM HG: ICD-10-PCS | Mod: CPTII,S$GLB,, | Performed by: INTERNAL MEDICINE

## 2021-04-22 PROCEDURE — 3008F BODY MASS INDEX DOCD: CPT | Mod: CPTII,S$GLB,, | Performed by: INTERNAL MEDICINE

## 2021-04-22 PROCEDURE — 1126F PR PAIN SEVERITY QUANTIFIED, NO PAIN PRESENT: ICD-10-PCS | Mod: S$GLB,,, | Performed by: INTERNAL MEDICINE

## 2021-04-22 PROCEDURE — 3079F DIAST BP 80-89 MM HG: CPT | Mod: CPTII,S$GLB,, | Performed by: INTERNAL MEDICINE

## 2021-04-22 PROCEDURE — 3075F PR MOST RECENT SYSTOLIC BLOOD PRESS GE 130-139MM HG: ICD-10-PCS | Mod: CPTII,S$GLB,, | Performed by: INTERNAL MEDICINE

## 2021-04-22 PROCEDURE — 93000 EKG 12-LEAD: ICD-10-PCS | Mod: S$GLB,,, | Performed by: INTERNAL MEDICINE

## 2021-04-22 PROCEDURE — 99999 PR PBB SHADOW E&M-EST. PATIENT-LVL IV: ICD-10-PCS | Mod: PBBFAC,,, | Performed by: INTERNAL MEDICINE

## 2021-04-22 RX ORDER — AMLODIPINE BESYLATE 5 MG/1
5 TABLET ORAL DAILY
Qty: 30 TABLET | Refills: 11 | Status: SHIPPED | OUTPATIENT
Start: 2021-04-22 | End: 2021-06-25

## 2021-05-04 ENCOUNTER — HOSPITAL ENCOUNTER (OUTPATIENT)
Facility: HOSPITAL | Age: 21
Discharge: HOME | End: 2021-05-04
Attending: INTERNAL MEDICINE | Admitting: INTERNAL MEDICINE
Payer: COMMERCIAL

## 2021-05-04 ENCOUNTER — ANESTHESIA EVENT (OUTPATIENT)
Dept: ENDOSCOPY | Facility: HOSPITAL | Age: 21
End: 2021-05-04
Payer: COMMERCIAL

## 2021-05-04 ENCOUNTER — ANESTHESIA (OUTPATIENT)
Dept: ENDOSCOPY | Facility: HOSPITAL | Age: 21
End: 2021-05-04
Payer: COMMERCIAL

## 2021-05-04 VITALS
DIASTOLIC BLOOD PRESSURE: 77 MMHG | TEMPERATURE: 98 F | BODY MASS INDEX: 21.67 KG/M2 | SYSTOLIC BLOOD PRESSURE: 137 MMHG | OXYGEN SATURATION: 99 % | RESPIRATION RATE: 18 BRPM | HEIGHT: 72 IN | WEIGHT: 160 LBS | HEART RATE: 72 BPM

## 2021-05-04 DIAGNOSIS — K92.0 HEMATEMESIS: ICD-10-CM

## 2021-05-04 PROCEDURE — E9220 PRA ENDO ANESTHESIA: ICD-10-PCS | Mod: ,,, | Performed by: NURSE ANESTHETIST, CERTIFIED REGISTERED

## 2021-05-04 PROCEDURE — 63600175 PHARM REV CODE 636 W HCPCS: Performed by: NURSE ANESTHETIST, CERTIFIED REGISTERED

## 2021-05-04 PROCEDURE — 43235 EGD DIAGNOSTIC BRUSH WASH: CPT | Mod: ,,, | Performed by: INTERNAL MEDICINE

## 2021-05-04 PROCEDURE — 37000009 HC ANESTHESIA EA ADD 15 MINS: Performed by: INTERNAL MEDICINE

## 2021-05-04 PROCEDURE — E9220 PRA ENDO ANESTHESIA: HCPCS | Mod: ,,, | Performed by: NURSE ANESTHETIST, CERTIFIED REGISTERED

## 2021-05-04 PROCEDURE — 43235 PR EGD, FLEX, DIAGNOSTIC: ICD-10-PCS | Mod: ,,, | Performed by: INTERNAL MEDICINE

## 2021-05-04 PROCEDURE — 25000003 PHARM REV CODE 250: Performed by: NURSE ANESTHETIST, CERTIFIED REGISTERED

## 2021-05-04 PROCEDURE — 37000008 HC ANESTHESIA 1ST 15 MINUTES: Performed by: INTERNAL MEDICINE

## 2021-05-04 PROCEDURE — 43235 EGD DIAGNOSTIC BRUSH WASH: CPT | Performed by: INTERNAL MEDICINE

## 2021-05-04 RX ORDER — LIDOCAINE HYDROCHLORIDE 20 MG/ML
INJECTION INTRAVENOUS
Status: DISCONTINUED | OUTPATIENT
Start: 2021-05-04 | End: 2021-05-04

## 2021-05-04 RX ORDER — PROPOFOL 10 MG/ML
VIAL (ML) INTRAVENOUS
Status: DISCONTINUED | OUTPATIENT
Start: 2021-05-04 | End: 2021-05-04

## 2021-05-04 RX ORDER — PROPOFOL 10 MG/ML
VIAL (ML) INTRAVENOUS CONTINUOUS PRN
Status: DISCONTINUED | OUTPATIENT
Start: 2021-05-04 | End: 2021-05-04

## 2021-05-04 RX ORDER — FENTANYL CITRATE 50 UG/ML
INJECTION, SOLUTION INTRAMUSCULAR; INTRAVENOUS
Status: DISCONTINUED | OUTPATIENT
Start: 2021-05-04 | End: 2021-05-04

## 2021-05-04 RX ORDER — SODIUM CHLORIDE 9 MG/ML
INJECTION, SOLUTION INTRAVENOUS CONTINUOUS
Status: DISCONTINUED | OUTPATIENT
Start: 2021-05-04 | End: 2021-05-04 | Stop reason: HOSPADM

## 2021-05-04 RX ADMIN — GLYCOPYRROLATE 0.2 MCG: 0.2 INJECTION, SOLUTION INTRAMUSCULAR; INTRAVITREAL at 09:05

## 2021-05-04 RX ADMIN — LIDOCAINE HYDROCHLORIDE 100 MG: 20 INJECTION, SOLUTION INTRAVENOUS at 09:05

## 2021-05-04 RX ADMIN — PROPOFOL 150 MCG/KG/MIN: 10 INJECTION, EMULSION INTRAVENOUS at 09:05

## 2021-05-04 RX ADMIN — SODIUM CHLORIDE: 0.9 INJECTION, SOLUTION INTRAVENOUS at 09:05

## 2021-05-04 RX ADMIN — FENTANYL CITRATE 100 MCG: 50 INJECTION, SOLUTION INTRAMUSCULAR; INTRAVENOUS at 09:05

## 2021-05-04 RX ADMIN — PROPOFOL 100 MG: 10 INJECTION, EMULSION INTRAVENOUS at 09:05

## 2021-05-15 ENCOUNTER — HOSPITAL ENCOUNTER (OUTPATIENT)
Facility: HOSPITAL | Age: 21
Discharge: HOME | End: 2021-05-16
Attending: EMERGENCY MEDICINE | Admitting: INTERNAL MEDICINE
Payer: COMMERCIAL

## 2021-05-15 DIAGNOSIS — I10 ESSENTIAL HYPERTENSION: ICD-10-CM

## 2021-05-15 DIAGNOSIS — I48.0 PAROXYSMAL ATRIAL FIBRILLATION: ICD-10-CM

## 2021-05-15 DIAGNOSIS — R00.0 TACHYCARDIA: ICD-10-CM

## 2021-05-15 DIAGNOSIS — I48.91 ATRIAL FIBRILLATION, RAPID: ICD-10-CM

## 2021-05-15 DIAGNOSIS — R55 SYNCOPE: ICD-10-CM

## 2021-05-15 DIAGNOSIS — R00.0 SINUS TACHYCARDIA: ICD-10-CM

## 2021-05-15 DIAGNOSIS — G43.D0 ABDOMINAL MIGRAINE, NOT INTRACTABLE: ICD-10-CM

## 2021-05-15 DIAGNOSIS — R11.15 CYCLICAL VOMITING: ICD-10-CM

## 2021-05-15 DIAGNOSIS — R11.10 HYPEREMESIS: Primary | ICD-10-CM

## 2021-05-15 LAB
ALBUMIN SERPL BCP-MCNC: 5.1 G/DL (ref 3.5–5.2)
ALP SERPL-CCNC: 98 U/L (ref 55–135)
ALT SERPL W/O P-5'-P-CCNC: 19 U/L (ref 10–44)
ANION GAP SERPL CALC-SCNC: 13 MMOL/L (ref 8–16)
AST SERPL-CCNC: 26 U/L (ref 10–40)
BASOPHILS # BLD AUTO: 0.04 K/UL (ref 0–0.2)
BASOPHILS NFR BLD: 0.3 % (ref 0–1.9)
BILIRUB SERPL-MCNC: 0.7 MG/DL (ref 0.1–1)
BUN SERPL-MCNC: 12 MG/DL (ref 6–20)
CALCIUM SERPL-MCNC: 10.4 MG/DL (ref 8.7–10.5)
CHLORIDE SERPL-SCNC: 104 MMOL/L (ref 95–110)
CO2 SERPL-SCNC: 22 MMOL/L (ref 23–29)
CREAT SERPL-MCNC: 1.1 MG/DL (ref 0.5–1.4)
DIFFERENTIAL METHOD: ABNORMAL
EOSINOPHIL # BLD AUTO: 0.1 K/UL (ref 0–0.5)
EOSINOPHIL NFR BLD: 0.8 % (ref 0–8)
ERYTHROCYTE [DISTWIDTH] IN BLOOD BY AUTOMATED COUNT: 12.4 % (ref 11.5–14.5)
EST. GFR  (AFRICAN AMERICAN): >60 ML/MIN/1.73 M^2
EST. GFR  (NON AFRICAN AMERICAN): >60 ML/MIN/1.73 M^2
GLUCOSE SERPL-MCNC: 114 MG/DL (ref 70–110)
HCT VFR BLD AUTO: 43.8 % (ref 40–54)
HGB BLD-MCNC: 15.5 G/DL (ref 14–18)
IMM GRANULOCYTES # BLD AUTO: 0.05 K/UL (ref 0–0.04)
IMM GRANULOCYTES NFR BLD AUTO: 0.4 % (ref 0–0.5)
LIPASE SERPL-CCNC: 30 U/L (ref 4–60)
LYMPHOCYTES # BLD AUTO: 1.2 K/UL (ref 1–4.8)
LYMPHOCYTES NFR BLD: 8.6 % (ref 18–48)
MAGNESIUM SERPL-MCNC: 1.9 MG/DL (ref 1.6–2.6)
MCH RBC QN AUTO: 28.8 PG (ref 27–31)
MCHC RBC AUTO-ENTMCNC: 35.4 G/DL (ref 32–36)
MCV RBC AUTO: 81 FL (ref 82–98)
MONOCYTES # BLD AUTO: 1 K/UL (ref 0.3–1)
MONOCYTES NFR BLD: 7 % (ref 4–15)
NEUTROPHILS # BLD AUTO: 11.6 K/UL (ref 1.8–7.7)
NEUTROPHILS NFR BLD: 82.9 % (ref 38–73)
NRBC BLD-RTO: 0 /100 WBC
PHOSPHATE SERPL-MCNC: 1.2 MG/DL (ref 2.7–4.5)
PLATELET # BLD AUTO: 163 K/UL (ref 150–450)
PMV BLD AUTO: 8.9 FL (ref 9.2–12.9)
POCT GLUCOSE: 104 MG/DL (ref 70–110)
POTASSIUM SERPL-SCNC: 3.6 MMOL/L (ref 3.5–5.1)
PROT SERPL-MCNC: 8.2 G/DL (ref 6–8.4)
RBC # BLD AUTO: 5.39 M/UL (ref 4.6–6.2)
SODIUM SERPL-SCNC: 139 MMOL/L (ref 136–145)
WBC # BLD AUTO: 13.94 K/UL (ref 3.9–12.7)

## 2021-05-15 PROCEDURE — 93005 ELECTROCARDIOGRAM TRACING: CPT

## 2021-05-15 PROCEDURE — 63600175 PHARM REV CODE 636 W HCPCS: Performed by: EMERGENCY MEDICINE

## 2021-05-15 PROCEDURE — 83735 ASSAY OF MAGNESIUM: CPT | Performed by: EMERGENCY MEDICINE

## 2021-05-15 PROCEDURE — 84100 ASSAY OF PHOSPHORUS: CPT | Performed by: EMERGENCY MEDICINE

## 2021-05-15 PROCEDURE — 80053 COMPREHEN METABOLIC PANEL: CPT | Performed by: EMERGENCY MEDICINE

## 2021-05-15 PROCEDURE — 85025 COMPLETE CBC W/AUTO DIFF WBC: CPT | Performed by: EMERGENCY MEDICINE

## 2021-05-15 PROCEDURE — 99291 PR CRITICAL CARE, E/M 30-74 MINUTES: ICD-10-PCS | Mod: ,,, | Performed by: EMERGENCY MEDICINE

## 2021-05-15 PROCEDURE — 99291 CRITICAL CARE FIRST HOUR: CPT | Mod: 25

## 2021-05-15 PROCEDURE — 93010 ELECTROCARDIOGRAM REPORT: CPT | Mod: ,,, | Performed by: INTERNAL MEDICINE

## 2021-05-15 PROCEDURE — 96375 TX/PRO/DX INJ NEW DRUG ADDON: CPT

## 2021-05-15 PROCEDURE — 99291 CRITICAL CARE FIRST HOUR: CPT | Mod: ,,, | Performed by: EMERGENCY MEDICINE

## 2021-05-15 PROCEDURE — 25000003 PHARM REV CODE 250: Performed by: EMERGENCY MEDICINE

## 2021-05-15 PROCEDURE — 83690 ASSAY OF LIPASE: CPT | Performed by: EMERGENCY MEDICINE

## 2021-05-15 PROCEDURE — 93010 EKG 12-LEAD: ICD-10-PCS | Mod: ,,, | Performed by: INTERNAL MEDICINE

## 2021-05-15 PROCEDURE — 96361 HYDRATE IV INFUSION ADD-ON: CPT

## 2021-05-15 PROCEDURE — 82962 GLUCOSE BLOOD TEST: CPT

## 2021-05-15 RX ORDER — METOPROLOL TARTRATE 1 MG/ML
5 INJECTION, SOLUTION INTRAVENOUS
Status: DISCONTINUED | OUTPATIENT
Start: 2021-05-15 | End: 2021-05-15

## 2021-05-15 RX ORDER — DROPERIDOL 2.5 MG/ML
2.5 INJECTION, SOLUTION INTRAMUSCULAR; INTRAVENOUS
Status: COMPLETED | OUTPATIENT
Start: 2021-05-15 | End: 2021-05-15

## 2021-05-15 RX ORDER — MORPHINE SULFATE 4 MG/ML
4 INJECTION, SOLUTION INTRAMUSCULAR; INTRAVENOUS
Status: COMPLETED | OUTPATIENT
Start: 2021-05-15 | End: 2021-05-15

## 2021-05-15 RX ADMIN — POTASSIUM PHOSPHATE, MONOBASIC AND POTASSIUM PHOSPHATE, DIBASIC 30 MMOL: 224; 236 INJECTION, SOLUTION, CONCENTRATE INTRAVENOUS at 11:05

## 2021-05-15 RX ADMIN — DROPERIDOL 2.5 MG: 2.5 INJECTION, SOLUTION INTRAMUSCULAR; INTRAVENOUS at 10:05

## 2021-05-15 RX ADMIN — MORPHINE SULFATE 4 MG: 4 INJECTION INTRAVENOUS at 10:05

## 2021-05-15 RX ADMIN — SODIUM CHLORIDE 1000 ML: 0.9 INJECTION, SOLUTION INTRAVENOUS at 10:05

## 2021-05-16 VITALS
TEMPERATURE: 98 F | OXYGEN SATURATION: 95 % | RESPIRATION RATE: 19 BRPM | BODY MASS INDEX: 21.67 KG/M2 | HEIGHT: 72 IN | SYSTOLIC BLOOD PRESSURE: 123 MMHG | DIASTOLIC BLOOD PRESSURE: 62 MMHG | WEIGHT: 160 LBS | HEART RATE: 90 BPM

## 2021-05-16 PROBLEM — R00.0 SINUS TACHYCARDIA: Status: ACTIVE | Noted: 2021-05-16

## 2021-05-16 PROBLEM — R11.10 HYPEREMESIS: Status: ACTIVE | Noted: 2021-05-16

## 2021-05-16 LAB
ALBUMIN SERPL BCP-MCNC: 4.1 G/DL (ref 3.5–5.2)
ALP SERPL-CCNC: 79 U/L (ref 55–135)
ALT SERPL W/O P-5'-P-CCNC: 15 U/L (ref 10–44)
ANION GAP SERPL CALC-SCNC: 12 MMOL/L (ref 8–16)
AST SERPL-CCNC: 21 U/L (ref 10–40)
BASOPHILS # BLD AUTO: 0.02 K/UL (ref 0–0.2)
BASOPHILS NFR BLD: 0.2 % (ref 0–1.9)
BILIRUB SERPL-MCNC: 0.6 MG/DL (ref 0.1–1)
BILIRUB UR QL STRIP: NEGATIVE
BUN SERPL-MCNC: 9 MG/DL (ref 6–20)
CALCIUM SERPL-MCNC: 8.8 MG/DL (ref 8.7–10.5)
CHLORIDE SERPL-SCNC: 104 MMOL/L (ref 95–110)
CLARITY UR REFRACT.AUTO: CLEAR
CO2 SERPL-SCNC: 21 MMOL/L (ref 23–29)
COLOR UR AUTO: COLORLESS
CREAT SERPL-MCNC: 0.8 MG/DL (ref 0.5–1.4)
DIFFERENTIAL METHOD: ABNORMAL
EOSINOPHIL # BLD AUTO: 0 K/UL (ref 0–0.5)
EOSINOPHIL NFR BLD: 0 % (ref 0–8)
ERYTHROCYTE [DISTWIDTH] IN BLOOD BY AUTOMATED COUNT: 12.3 % (ref 11.5–14.5)
EST. GFR  (AFRICAN AMERICAN): >60 ML/MIN/1.73 M^2
EST. GFR  (NON AFRICAN AMERICAN): >60 ML/MIN/1.73 M^2
GLUCOSE SERPL-MCNC: 125 MG/DL (ref 70–110)
GLUCOSE UR QL STRIP: NEGATIVE
HCT VFR BLD AUTO: 39.2 % (ref 40–54)
HGB BLD-MCNC: 14 G/DL (ref 14–18)
HGB UR QL STRIP: NEGATIVE
IMM GRANULOCYTES # BLD AUTO: 0.04 K/UL (ref 0–0.04)
IMM GRANULOCYTES NFR BLD AUTO: 0.3 % (ref 0–0.5)
KETONES UR QL STRIP: NEGATIVE
LEUKOCYTE ESTERASE UR QL STRIP: NEGATIVE
LYMPHOCYTES # BLD AUTO: 0.6 K/UL (ref 1–4.8)
LYMPHOCYTES NFR BLD: 4.9 % (ref 18–48)
MAGNESIUM SERPL-MCNC: 1.8 MG/DL (ref 1.6–2.6)
MCH RBC QN AUTO: 29.9 PG (ref 27–31)
MCHC RBC AUTO-ENTMCNC: 35.7 G/DL (ref 32–36)
MCV RBC AUTO: 84 FL (ref 82–98)
MONOCYTES # BLD AUTO: 0.3 K/UL (ref 0.3–1)
MONOCYTES NFR BLD: 2.9 % (ref 4–15)
NEUTROPHILS # BLD AUTO: 10.7 K/UL (ref 1.8–7.7)
NEUTROPHILS NFR BLD: 91.7 % (ref 38–73)
NITRITE UR QL STRIP: NEGATIVE
NRBC BLD-RTO: 0 /100 WBC
PH UR STRIP: 7 [PH] (ref 5–8)
PHOSPHATE SERPL-MCNC: 4.3 MG/DL (ref 2.7–4.5)
PLATELET # BLD AUTO: 143 K/UL (ref 150–450)
PMV BLD AUTO: 9 FL (ref 9.2–12.9)
POTASSIUM SERPL-SCNC: 3.8 MMOL/L (ref 3.5–5.1)
PROT SERPL-MCNC: 6.6 G/DL (ref 6–8.4)
PROT UR QL STRIP: NEGATIVE
RBC # BLD AUTO: 4.69 M/UL (ref 4.6–6.2)
SODIUM SERPL-SCNC: 137 MMOL/L (ref 136–145)
SP GR UR STRIP: 1 (ref 1–1.03)
TSH SERPL DL<=0.005 MIU/L-ACNC: 1.31 UIU/ML (ref 0.4–4)
URN SPEC COLLECT METH UR: ABNORMAL
WBC # BLD AUTO: 11.72 K/UL (ref 3.9–12.7)

## 2021-05-16 PROCEDURE — 93010 EKG 12-LEAD: ICD-10-PCS | Mod: ,,, | Performed by: INTERNAL MEDICINE

## 2021-05-16 PROCEDURE — 96367 TX/PROPH/DG ADDL SEQ IV INF: CPT

## 2021-05-16 PROCEDURE — 81003 URINALYSIS AUTO W/O SCOPE: CPT | Performed by: PHYSICIAN ASSISTANT

## 2021-05-16 PROCEDURE — 36415 COLL VENOUS BLD VENIPUNCTURE: CPT | Performed by: NURSE PRACTITIONER

## 2021-05-16 PROCEDURE — 96366 THER/PROPH/DIAG IV INF ADDON: CPT

## 2021-05-16 PROCEDURE — 83735 ASSAY OF MAGNESIUM: CPT | Performed by: NURSE PRACTITIONER

## 2021-05-16 PROCEDURE — 25000003 PHARM REV CODE 250: Performed by: EMERGENCY MEDICINE

## 2021-05-16 PROCEDURE — G0378 HOSPITAL OBSERVATION PER HR: HCPCS

## 2021-05-16 PROCEDURE — 99236 PR OBSERV/HOSP SAME DATE,LEVL V: ICD-10-PCS | Mod: ,,, | Performed by: NURSE PRACTITIONER

## 2021-05-16 PROCEDURE — 63600175 PHARM REV CODE 636 W HCPCS: Performed by: PHYSICIAN ASSISTANT

## 2021-05-16 PROCEDURE — 80053 COMPREHEN METABOLIC PANEL: CPT | Performed by: NURSE PRACTITIONER

## 2021-05-16 PROCEDURE — 84100 ASSAY OF PHOSPHORUS: CPT | Performed by: NURSE PRACTITIONER

## 2021-05-16 PROCEDURE — 25000003 PHARM REV CODE 250: Performed by: NURSE PRACTITIONER

## 2021-05-16 PROCEDURE — 36415 COLL VENOUS BLD VENIPUNCTURE: CPT | Performed by: PHYSICIAN ASSISTANT

## 2021-05-16 PROCEDURE — 93005 ELECTROCARDIOGRAM TRACING: CPT

## 2021-05-16 PROCEDURE — 63600175 PHARM REV CODE 636 W HCPCS: Performed by: NURSE PRACTITIONER

## 2021-05-16 PROCEDURE — 96375 TX/PRO/DX INJ NEW DRUG ADDON: CPT

## 2021-05-16 PROCEDURE — 96361 HYDRATE IV INFUSION ADD-ON: CPT

## 2021-05-16 PROCEDURE — 96365 THER/PROPH/DIAG IV INF INIT: CPT

## 2021-05-16 PROCEDURE — 93010 ELECTROCARDIOGRAM REPORT: CPT | Mod: ,,, | Performed by: INTERNAL MEDICINE

## 2021-05-16 PROCEDURE — 99236 HOSP IP/OBS SAME DATE HI 85: CPT | Mod: ,,, | Performed by: NURSE PRACTITIONER

## 2021-05-16 PROCEDURE — 84443 ASSAY THYROID STIM HORMONE: CPT | Performed by: PHYSICIAN ASSISTANT

## 2021-05-16 PROCEDURE — 85025 COMPLETE CBC W/AUTO DIFF WBC: CPT | Performed by: NURSE PRACTITIONER

## 2021-05-16 PROCEDURE — 25000003 PHARM REV CODE 250: Performed by: PHYSICIAN ASSISTANT

## 2021-05-16 RX ORDER — ALUMINUM HYDROXIDE, MAGNESIUM HYDROXIDE, AND SIMETHICONE 2400; 240; 2400 MG/30ML; MG/30ML; MG/30ML
30 SUSPENSION ORAL ONCE
Status: COMPLETED | OUTPATIENT
Start: 2021-05-16 | End: 2021-05-16

## 2021-05-16 RX ORDER — SODIUM CHLORIDE 0.9 % (FLUSH) 0.9 %
10 SYRINGE (ML) INJECTION
Status: DISCONTINUED | OUTPATIENT
Start: 2021-05-16 | End: 2021-05-16 | Stop reason: HOSPADM

## 2021-05-16 RX ORDER — BISACODYL 10 MG
10 SUPPOSITORY, RECTAL RECTAL DAILY PRN
Status: DISCONTINUED | OUTPATIENT
Start: 2021-05-16 | End: 2021-05-16 | Stop reason: HOSPADM

## 2021-05-16 RX ORDER — SODIUM CHLORIDE 0.9 % (FLUSH) 0.9 %
10 SYRINGE (ML) INJECTION
Status: DISCONTINUED | OUTPATIENT
Start: 2021-05-16 | End: 2021-05-16

## 2021-05-16 RX ORDER — SODIUM CHLORIDE 9 MG/ML
INJECTION, SOLUTION INTRAVENOUS CONTINUOUS
Status: DISCONTINUED | OUTPATIENT
Start: 2021-05-16 | End: 2021-05-16 | Stop reason: HOSPADM

## 2021-05-16 RX ORDER — POTASSIUM CHLORIDE 750 MG/1
30 CAPSULE, EXTENDED RELEASE ORAL ONCE
Status: COMPLETED | OUTPATIENT
Start: 2021-05-16 | End: 2021-05-16

## 2021-05-16 RX ORDER — ACETAMINOPHEN 325 MG/1
650 TABLET ORAL EVERY 4 HOURS PRN
Status: DISCONTINUED | OUTPATIENT
Start: 2021-05-16 | End: 2021-05-16 | Stop reason: HOSPADM

## 2021-05-16 RX ORDER — MAGNESIUM SULFATE HEPTAHYDRATE 40 MG/ML
2 INJECTION, SOLUTION INTRAVENOUS ONCE
Status: COMPLETED | OUTPATIENT
Start: 2021-05-16 | End: 2021-05-16

## 2021-05-16 RX ORDER — POLYETHYLENE GLYCOL 3350 17 G/17G
17 POWDER, FOR SOLUTION ORAL 2 TIMES DAILY PRN
Status: DISCONTINUED | OUTPATIENT
Start: 2021-05-16 | End: 2021-05-16 | Stop reason: HOSPADM

## 2021-05-16 RX ORDER — TRAMADOL HYDROCHLORIDE 50 MG/1
50 TABLET ORAL EVERY 6 HOURS PRN
Qty: 12 TABLET | Refills: 0 | Status: SHIPPED | OUTPATIENT
Start: 2021-05-16 | End: 2022-09-01

## 2021-05-16 RX ORDER — LIDOCAINE HYDROCHLORIDE 20 MG/ML
10 SOLUTION OROPHARYNGEAL ONCE
Status: COMPLETED | OUTPATIENT
Start: 2021-05-16 | End: 2021-05-16

## 2021-05-16 RX ORDER — TRAMADOL HYDROCHLORIDE 50 MG/1
50 TABLET ORAL EVERY 6 HOURS PRN
Status: DISCONTINUED | OUTPATIENT
Start: 2021-05-16 | End: 2021-05-16 | Stop reason: HOSPADM

## 2021-05-16 RX ORDER — HALOPERIDOL 2 MG/1
2 TABLET ORAL EVERY 8 HOURS PRN
Status: DISCONTINUED | OUTPATIENT
Start: 2021-05-16 | End: 2021-05-16 | Stop reason: HOSPADM

## 2021-05-16 RX ORDER — PANTOPRAZOLE SODIUM 40 MG/1
40 TABLET, DELAYED RELEASE ORAL DAILY
Refills: 3 | Status: DISCONTINUED | OUTPATIENT
Start: 2021-05-16 | End: 2021-05-16 | Stop reason: HOSPADM

## 2021-05-16 RX ORDER — HALOPERIDOL 5 MG/ML
5 INJECTION INTRAMUSCULAR EVERY 6 HOURS PRN
Status: DISCONTINUED | OUTPATIENT
Start: 2021-05-16 | End: 2021-05-16 | Stop reason: HOSPADM

## 2021-05-16 RX ORDER — HYDROMORPHONE HYDROCHLORIDE 1 MG/ML
1 INJECTION, SOLUTION INTRAMUSCULAR; INTRAVENOUS; SUBCUTANEOUS EVERY 6 HOURS PRN
Status: DISCONTINUED | OUTPATIENT
Start: 2021-05-16 | End: 2021-05-16 | Stop reason: HOSPADM

## 2021-05-16 RX ORDER — SUMATRIPTAN SUCCINATE 25 MG/1
25 TABLET ORAL EVERY 4 HOURS PRN
Status: DISCONTINUED | OUTPATIENT
Start: 2021-05-16 | End: 2021-05-16 | Stop reason: HOSPADM

## 2021-05-16 RX ORDER — LOPERAMIDE HYDROCHLORIDE 2 MG/1
2 CAPSULE ORAL
Status: DISCONTINUED | OUTPATIENT
Start: 2021-05-16 | End: 2021-05-16 | Stop reason: HOSPADM

## 2021-05-16 RX ORDER — TALC
6 POWDER (GRAM) TOPICAL NIGHTLY PRN
Status: DISCONTINUED | OUTPATIENT
Start: 2021-05-16 | End: 2021-05-16 | Stop reason: HOSPADM

## 2021-05-16 RX ORDER — PROMETHAZINE HYDROCHLORIDE 12.5 MG/1
25 TABLET ORAL EVERY 6 HOURS PRN
Status: DISCONTINUED | OUTPATIENT
Start: 2021-05-16 | End: 2021-05-16 | Stop reason: HOSPADM

## 2021-05-16 RX ORDER — AMLODIPINE BESYLATE 5 MG/1
5 TABLET ORAL DAILY
Status: DISCONTINUED | OUTPATIENT
Start: 2021-05-16 | End: 2021-05-16 | Stop reason: HOSPADM

## 2021-05-16 RX ORDER — TALC
6 POWDER (GRAM) TOPICAL NIGHTLY PRN
Status: DISCONTINUED | OUTPATIENT
Start: 2021-05-16 | End: 2021-05-16

## 2021-05-16 RX ADMIN — AMLODIPINE BESYLATE 5 MG: 5 TABLET ORAL at 09:05

## 2021-05-16 RX ADMIN — ALUMINUM HYDROXIDE, MAGNESIUM HYDROXIDE, AND DIMETHICONE 30 ML: 400; 400; 40 SUSPENSION ORAL at 04:05

## 2021-05-16 RX ADMIN — MAGNESIUM SULFATE 2 G: 2 INJECTION INTRAVENOUS at 03:05

## 2021-05-16 RX ADMIN — SODIUM CHLORIDE: 0.9 INJECTION, SOLUTION INTRAVENOUS at 03:05

## 2021-05-16 RX ADMIN — SODIUM CHLORIDE: 0.9 INJECTION, SOLUTION INTRAVENOUS at 09:05

## 2021-05-16 RX ADMIN — HYDROMORPHONE HYDROCHLORIDE 1 MG: 1 INJECTION, SOLUTION INTRAMUSCULAR; INTRAVENOUS; SUBCUTANEOUS at 02:05

## 2021-05-16 RX ADMIN — SODIUM CHLORIDE 1000 ML: 0.9 INJECTION, SOLUTION INTRAVENOUS at 12:05

## 2021-05-16 RX ADMIN — PANTOPRAZOLE SODIUM 40 MG: 40 TABLET, DELAYED RELEASE ORAL at 09:05

## 2021-05-16 RX ADMIN — LIDOCAINE HYDROCHLORIDE 10 ML: 20 SOLUTION ORAL; TOPICAL at 04:05

## 2021-05-16 RX ADMIN — POTASSIUM BICARBONATE 50 MEQ: 978 TABLET, EFFERVESCENT ORAL at 11:05

## 2021-05-16 RX ADMIN — MAGNESIUM SULFATE 2 G: 2 INJECTION INTRAVENOUS at 11:05

## 2021-05-16 RX ADMIN — POTASSIUM CHLORIDE 30 MEQ: 10 CAPSULE, COATED, EXTENDED RELEASE ORAL at 03:05

## 2021-05-18 PROBLEM — R11.15 CYCLICAL VOMITING: Status: RESOLVED | Noted: 2018-07-23 | Resolved: 2021-05-18

## 2021-05-19 ENCOUNTER — OFFICE VISIT (OUTPATIENT)
Dept: INTERNAL MEDICINE | Facility: CLINIC | Age: 21
End: 2021-05-19
Payer: COMMERCIAL

## 2021-05-19 DIAGNOSIS — J32.9 BACTERIAL SINUSITIS: Primary | ICD-10-CM

## 2021-05-19 DIAGNOSIS — B96.89 BACTERIAL SINUSITIS: Primary | ICD-10-CM

## 2021-05-19 PROCEDURE — 99213 OFFICE O/P EST LOW 20 MIN: CPT | Mod: 95,,, | Performed by: INTERNAL MEDICINE

## 2021-05-19 PROCEDURE — 99213 PR OFFICE/OUTPT VISIT, EST, LEVL III, 20-29 MIN: ICD-10-PCS | Mod: 95,,, | Performed by: INTERNAL MEDICINE

## 2021-05-19 RX ORDER — AMOXICILLIN AND CLAVULANATE POTASSIUM 875; 125 MG/1; MG/1
1 TABLET, FILM COATED ORAL 2 TIMES DAILY
Qty: 14 TABLET | Refills: 0 | Status: SHIPPED | OUTPATIENT
Start: 2021-05-19 | End: 2021-05-26

## 2021-05-19 RX ORDER — FLUTICASONE PROPIONATE 50 MCG
1 SPRAY, SUSPENSION (ML) NASAL DAILY
Qty: 16 G | Refills: 0 | Status: SHIPPED | OUTPATIENT
Start: 2021-05-19 | End: 2021-07-15

## 2021-05-19 RX ORDER — LEVOCETIRIZINE DIHYDROCHLORIDE 5 MG/1
5 TABLET, FILM COATED ORAL NIGHTLY
Qty: 30 TABLET | Refills: 0 | Status: SHIPPED | OUTPATIENT
Start: 2021-05-19 | End: 2021-05-25

## 2021-05-25 ENCOUNTER — IMMUNIZATION (OUTPATIENT)
Dept: INTERNAL MEDICINE | Facility: CLINIC | Age: 21
End: 2021-05-25
Payer: COMMERCIAL

## 2021-05-25 DIAGNOSIS — Z23 NEED FOR VACCINATION: Primary | ICD-10-CM

## 2021-05-25 PROCEDURE — 91300 COVID-19, MRNA, LNP-S, PF, 30 MCG/0.3 ML DOSE VACCINE: CPT | Mod: PBBFAC | Performed by: INTERNAL MEDICINE

## 2021-05-25 RX ORDER — LEVOCETIRIZINE DIHYDROCHLORIDE 5 MG/1
TABLET, FILM COATED ORAL
Qty: 90 TABLET | Refills: 1 | Status: SHIPPED | OUTPATIENT
Start: 2021-05-25 | End: 2021-05-26 | Stop reason: SDUPTHER

## 2021-05-27 RX ORDER — LEVOCETIRIZINE DIHYDROCHLORIDE 5 MG/1
5 TABLET, FILM COATED ORAL NIGHTLY
Qty: 90 TABLET | Refills: 1 | Status: SHIPPED | OUTPATIENT
Start: 2021-05-27 | End: 2022-09-01

## 2021-06-04 DIAGNOSIS — I10 ESSENTIAL HYPERTENSION: Chronic | ICD-10-CM

## 2021-06-04 RX ORDER — POTASSIUM CHLORIDE 750 MG/1
10 TABLET, EXTENDED RELEASE ORAL NIGHTLY
Qty: 90 TABLET | Refills: 0 | Status: SHIPPED | OUTPATIENT
Start: 2021-06-04 | End: 2021-06-25

## 2021-06-17 ENCOUNTER — IMMUNIZATION (OUTPATIENT)
Dept: PRIMARY CARE CLINIC | Facility: CLINIC | Age: 21
End: 2021-06-17
Payer: COMMERCIAL

## 2021-06-17 DIAGNOSIS — Z23 NEED FOR VACCINATION: Primary | ICD-10-CM

## 2021-06-17 PROCEDURE — 91300 COVID-19, MRNA, LNP-S, PF, 30 MCG/0.3 ML DOSE VACCINE: ICD-10-PCS | Mod: S$GLB,,, | Performed by: INTERNAL MEDICINE

## 2021-06-17 PROCEDURE — 0002A COVID-19, MRNA, LNP-S, PF, 30 MCG/0.3 ML DOSE VACCINE: ICD-10-PCS | Mod: CV19,S$GLB,, | Performed by: INTERNAL MEDICINE

## 2021-06-17 PROCEDURE — 0002A COVID-19, MRNA, LNP-S, PF, 30 MCG/0.3 ML DOSE VACCINE: CPT | Mod: CV19,S$GLB,, | Performed by: INTERNAL MEDICINE

## 2021-06-17 PROCEDURE — 91300 COVID-19, MRNA, LNP-S, PF, 30 MCG/0.3 ML DOSE VACCINE: CPT | Mod: S$GLB,,, | Performed by: INTERNAL MEDICINE

## 2021-06-24 ENCOUNTER — PATIENT OUTREACH (OUTPATIENT)
Dept: ADMINISTRATIVE | Facility: OTHER | Age: 21
End: 2021-06-24

## 2021-06-25 ENCOUNTER — OFFICE VISIT (OUTPATIENT)
Dept: CARDIOLOGY | Facility: CLINIC | Age: 21
End: 2021-06-25
Payer: COMMERCIAL

## 2021-06-25 VITALS
BODY MASS INDEX: 21.59 KG/M2 | DIASTOLIC BLOOD PRESSURE: 88 MMHG | HEIGHT: 72 IN | WEIGHT: 159.38 LBS | HEART RATE: 79 BPM | SYSTOLIC BLOOD PRESSURE: 125 MMHG

## 2021-06-25 DIAGNOSIS — R11.15 CYCLIC VOMITING SYNDROME: ICD-10-CM

## 2021-06-25 DIAGNOSIS — I10 ESSENTIAL HYPERTENSION: Primary | Chronic | ICD-10-CM

## 2021-06-25 DIAGNOSIS — I48.0 PAF (PAROXYSMAL ATRIAL FIBRILLATION): ICD-10-CM

## 2021-06-25 PROCEDURE — 99999 PR PBB SHADOW E&M-EST. PATIENT-LVL III: ICD-10-PCS | Mod: PBBFAC,,, | Performed by: INTERNAL MEDICINE

## 2021-06-25 PROCEDURE — 99999 PR PBB SHADOW E&M-EST. PATIENT-LVL III: CPT | Mod: PBBFAC,,, | Performed by: INTERNAL MEDICINE

## 2021-06-25 PROCEDURE — 3008F PR BODY MASS INDEX (BMI) DOCUMENTED: ICD-10-PCS | Mod: CPTII,S$GLB,, | Performed by: INTERNAL MEDICINE

## 2021-06-25 PROCEDURE — 99214 PR OFFICE/OUTPT VISIT, EST, LEVL IV, 30-39 MIN: ICD-10-PCS | Mod: S$GLB,,, | Performed by: INTERNAL MEDICINE

## 2021-06-25 PROCEDURE — 3074F SYST BP LT 130 MM HG: CPT | Mod: CPTII,S$GLB,, | Performed by: INTERNAL MEDICINE

## 2021-06-25 PROCEDURE — 3008F BODY MASS INDEX DOCD: CPT | Mod: CPTII,S$GLB,, | Performed by: INTERNAL MEDICINE

## 2021-06-25 PROCEDURE — 99214 OFFICE O/P EST MOD 30 MIN: CPT | Mod: S$GLB,,, | Performed by: INTERNAL MEDICINE

## 2021-06-25 PROCEDURE — 3074F PR MOST RECENT SYSTOLIC BLOOD PRESSURE < 130 MM HG: ICD-10-PCS | Mod: CPTII,S$GLB,, | Performed by: INTERNAL MEDICINE

## 2021-06-25 PROCEDURE — 1126F PR PAIN SEVERITY QUANTIFIED, NO PAIN PRESENT: ICD-10-PCS | Mod: S$GLB,,, | Performed by: INTERNAL MEDICINE

## 2021-06-25 PROCEDURE — 1126F AMNT PAIN NOTED NONE PRSNT: CPT | Mod: S$GLB,,, | Performed by: INTERNAL MEDICINE

## 2021-06-25 PROCEDURE — 3079F PR MOST RECENT DIASTOLIC BLOOD PRESSURE 80-89 MM HG: ICD-10-PCS | Mod: CPTII,S$GLB,, | Performed by: INTERNAL MEDICINE

## 2021-06-25 PROCEDURE — 3079F DIAST BP 80-89 MM HG: CPT | Mod: CPTII,S$GLB,, | Performed by: INTERNAL MEDICINE

## 2021-06-29 RX ORDER — OMEPRAZOLE 40 MG/1
40 CAPSULE, DELAYED RELEASE ORAL EVERY MORNING
Qty: 30 CAPSULE | Refills: 3 | Status: SHIPPED | OUTPATIENT
Start: 2021-06-29 | End: 2022-09-01

## 2021-09-13 ENCOUNTER — TELEPHONE (OUTPATIENT)
Dept: ORTHOPEDICS | Facility: CLINIC | Age: 21
End: 2021-09-13

## 2021-09-13 DIAGNOSIS — M79.672 FOOT PAIN, LEFT: Primary | ICD-10-CM

## 2021-09-14 ENCOUNTER — HOSPITAL ENCOUNTER (OUTPATIENT)
Dept: RADIOLOGY | Facility: HOSPITAL | Age: 21
Discharge: HOME | End: 2021-09-14
Attending: ORTHOPAEDIC SURGERY
Payer: COMMERCIAL

## 2021-09-14 ENCOUNTER — OFFICE VISIT (OUTPATIENT)
Dept: ORTHOPEDICS | Facility: CLINIC | Age: 21
End: 2021-09-14
Payer: COMMERCIAL

## 2021-09-14 ENCOUNTER — PATIENT MESSAGE (OUTPATIENT)
Dept: ADMINISTRATIVE | Facility: OTHER | Age: 21
End: 2021-09-14

## 2021-09-14 VITALS
HEART RATE: 85 BPM | SYSTOLIC BLOOD PRESSURE: 141 MMHG | HEIGHT: 72 IN | WEIGHT: 160.94 LBS | DIASTOLIC BLOOD PRESSURE: 85 MMHG | BODY MASS INDEX: 21.8 KG/M2

## 2021-09-14 DIAGNOSIS — M79.672 LEFT FOOT PAIN: Primary | ICD-10-CM

## 2021-09-14 DIAGNOSIS — M79.672 FOOT PAIN, LEFT: ICD-10-CM

## 2021-09-14 PROCEDURE — 73630 X-RAY EXAM OF FOOT: CPT | Mod: TC,FY,LT

## 2021-09-14 PROCEDURE — 99203 OFFICE O/P NEW LOW 30 MIN: CPT | Mod: S$GLB,,, | Performed by: ORTHOPAEDIC SURGERY

## 2021-09-14 PROCEDURE — 73630 X-RAY EXAM OF FOOT: CPT | Mod: 26,LT,, | Performed by: STUDENT IN AN ORGANIZED HEALTH CARE EDUCATION/TRAINING PROGRAM

## 2021-09-14 PROCEDURE — 3008F BODY MASS INDEX DOCD: CPT | Mod: CPTII,S$GLB,, | Performed by: ORTHOPAEDIC SURGERY

## 2021-09-14 PROCEDURE — 3077F SYST BP >= 140 MM HG: CPT | Mod: CPTII,S$GLB,, | Performed by: ORTHOPAEDIC SURGERY

## 2021-09-14 PROCEDURE — 3079F DIAST BP 80-89 MM HG: CPT | Mod: CPTII,S$GLB,, | Performed by: ORTHOPAEDIC SURGERY

## 2021-09-14 PROCEDURE — 3077F PR MOST RECENT SYSTOLIC BLOOD PRESSURE >= 140 MM HG: ICD-10-PCS | Mod: CPTII,S$GLB,, | Performed by: ORTHOPAEDIC SURGERY

## 2021-09-14 PROCEDURE — 99999 PR PBB SHADOW E&M-EST. PATIENT-LVL III: ICD-10-PCS | Mod: PBBFAC,,, | Performed by: ORTHOPAEDIC SURGERY

## 2021-09-14 PROCEDURE — 3008F PR BODY MASS INDEX (BMI) DOCUMENTED: ICD-10-PCS | Mod: CPTII,S$GLB,, | Performed by: ORTHOPAEDIC SURGERY

## 2021-09-14 PROCEDURE — 1159F MED LIST DOCD IN RCRD: CPT | Mod: CPTII,S$GLB,, | Performed by: ORTHOPAEDIC SURGERY

## 2021-09-14 PROCEDURE — 1159F PR MEDICATION LIST DOCUMENTED IN MEDICAL RECORD: ICD-10-PCS | Mod: CPTII,S$GLB,, | Performed by: ORTHOPAEDIC SURGERY

## 2021-09-14 PROCEDURE — 99999 PR PBB SHADOW E&M-EST. PATIENT-LVL III: CPT | Mod: PBBFAC,,, | Performed by: ORTHOPAEDIC SURGERY

## 2021-09-14 PROCEDURE — 99203 PR OFFICE/OUTPT VISIT, NEW, LEVL III, 30-44 MIN: ICD-10-PCS | Mod: S$GLB,,, | Performed by: ORTHOPAEDIC SURGERY

## 2021-09-14 PROCEDURE — 3079F PR MOST RECENT DIASTOLIC BLOOD PRESSURE 80-89 MM HG: ICD-10-PCS | Mod: CPTII,S$GLB,, | Performed by: ORTHOPAEDIC SURGERY

## 2021-09-14 PROCEDURE — 73630 XR FOOT COMPLETE 3 VIEW LEFT: ICD-10-PCS | Mod: 26,LT,, | Performed by: STUDENT IN AN ORGANIZED HEALTH CARE EDUCATION/TRAINING PROGRAM

## 2021-09-20 ENCOUNTER — HOSPITAL ENCOUNTER (EMERGENCY)
Facility: HOSPITAL | Age: 21
Discharge: HOME | End: 2021-09-21
Attending: EMERGENCY MEDICINE
Payer: COMMERCIAL

## 2021-09-20 ENCOUNTER — OFFICE VISIT (OUTPATIENT)
Dept: URGENT CARE | Facility: CLINIC | Age: 21
End: 2021-09-20
Payer: COMMERCIAL

## 2021-09-20 VITALS
WEIGHT: 160 LBS | TEMPERATURE: 98 F | HEIGHT: 72 IN | HEART RATE: 88 BPM | SYSTOLIC BLOOD PRESSURE: 153 MMHG | OXYGEN SATURATION: 97 % | RESPIRATION RATE: 20 BRPM | DIASTOLIC BLOOD PRESSURE: 112 MMHG | BODY MASS INDEX: 21.67 KG/M2

## 2021-09-20 DIAGNOSIS — R10.84 GENERALIZED ABDOMINAL PAIN: ICD-10-CM

## 2021-09-20 DIAGNOSIS — R11.15 CYCLICAL VOMITING: Primary | ICD-10-CM

## 2021-09-20 DIAGNOSIS — I10 ELEVATED BLOOD PRESSURE READING WITH DIAGNOSIS OF HYPERTENSION: ICD-10-CM

## 2021-09-20 DIAGNOSIS — R17 SERUM TOTAL BILIRUBIN ELEVATED: ICD-10-CM

## 2021-09-20 DIAGNOSIS — R11.2 INTRACTABLE VOMITING WITH NAUSEA, UNSPECIFIED VOMITING TYPE: ICD-10-CM

## 2021-09-20 DIAGNOSIS — R11.15 CYCLIC VOMITING SYNDROME: Primary | ICD-10-CM

## 2021-09-20 LAB
ALBUMIN SERPL BCP-MCNC: 5.1 G/DL (ref 3.5–5.2)
ALP SERPL-CCNC: 91 U/L (ref 55–135)
ALT SERPL W/O P-5'-P-CCNC: 21 U/L (ref 10–44)
ANION GAP SERPL CALC-SCNC: 16 MMOL/L (ref 8–16)
AST SERPL-CCNC: 44 U/L (ref 10–40)
BASOPHILS # BLD AUTO: 0.01 K/UL (ref 0–0.2)
BASOPHILS NFR BLD: 0.1 % (ref 0–1.9)
BILIRUB SERPL-MCNC: 1.6 MG/DL (ref 0.1–1)
BUN SERPL-MCNC: 15 MG/DL (ref 6–20)
CALCIUM SERPL-MCNC: 10.5 MG/DL (ref 8.7–10.5)
CHLORIDE SERPL-SCNC: 106 MMOL/L (ref 95–110)
CO2 SERPL-SCNC: 18 MMOL/L (ref 23–29)
CREAT SERPL-MCNC: 1.2 MG/DL (ref 0.5–1.4)
DIFFERENTIAL METHOD: ABNORMAL
EOSINOPHIL # BLD AUTO: 0 K/UL (ref 0–0.5)
EOSINOPHIL NFR BLD: 0 % (ref 0–8)
ERYTHROCYTE [DISTWIDTH] IN BLOOD BY AUTOMATED COUNT: 12.4 % (ref 11.5–14.5)
EST. GFR  (AFRICAN AMERICAN): >60 ML/MIN/1.73 M^2
EST. GFR  (NON AFRICAN AMERICAN): >60 ML/MIN/1.73 M^2
GLUCOSE SERPL-MCNC: 137 MG/DL (ref 70–110)
GLUCOSE SERPL-MCNC: 153 MG/DL (ref 70–110)
HCT VFR BLD AUTO: 42.7 % (ref 40–54)
HGB BLD-MCNC: 15.4 G/DL (ref 14–18)
IMM GRANULOCYTES # BLD AUTO: 0.04 K/UL (ref 0–0.04)
IMM GRANULOCYTES NFR BLD AUTO: 0.5 % (ref 0–0.5)
LYMPHOCYTES # BLD AUTO: 0.8 K/UL (ref 1–4.8)
LYMPHOCYTES NFR BLD: 9 % (ref 18–48)
MAGNESIUM SERPL-MCNC: 1.8 MG/DL (ref 1.6–2.6)
MCH RBC QN AUTO: 29.3 PG (ref 27–31)
MCHC RBC AUTO-ENTMCNC: 36.1 G/DL (ref 32–36)
MCV RBC AUTO: 81 FL (ref 82–98)
MONOCYTES # BLD AUTO: 0.4 K/UL (ref 0.3–1)
MONOCYTES NFR BLD: 4.1 % (ref 4–15)
NEUTROPHILS # BLD AUTO: 7.5 K/UL (ref 1.8–7.7)
NEUTROPHILS NFR BLD: 86.3 % (ref 38–73)
NRBC BLD-RTO: 0 /100 WBC
PLATELET # BLD AUTO: 152 K/UL (ref 150–450)
PMV BLD AUTO: 9.1 FL (ref 9.2–12.9)
POC ANION GAP: 22 MMOL/L (ref 10–20)
POC BUN: 19 MMOL/L (ref 8–26)
POC CHLORIDE: 101 MMOL/L (ref 98–109)
POC CREATININE: 1.1 MG/DL (ref 0.6–1.3)
POC HEMATOCRIT: 45 %PCV (ref 42–52)
POC HEMOGLOBIN: 15.3 G/DL (ref 13.5–18)
POC ICA: 1.24 MMOL/L (ref 1.12–1.32)
POC POTASSIUM: 3.4 MMOL/L (ref 3.5–4.9)
POC SODIUM: 142 MMOL/L (ref 138–146)
POC TCO2: 24 MMOL/L (ref 24–29)
POTASSIUM SERPL-SCNC: 3.8 MMOL/L (ref 3.5–5.1)
PROT SERPL-MCNC: 7.6 G/DL (ref 6–8.4)
RBC # BLD AUTO: 5.26 M/UL (ref 4.6–6.2)
SODIUM SERPL-SCNC: 140 MMOL/L (ref 136–145)
WBC # BLD AUTO: 8.73 K/UL (ref 3.9–12.7)

## 2021-09-20 PROCEDURE — 1160F RVW MEDS BY RX/DR IN RCRD: CPT | Mod: CPTII,S$GLB,, | Performed by: NURSE PRACTITIONER

## 2021-09-20 PROCEDURE — S0119 ONDANSETRON 4 MG: HCPCS | Mod: S$GLB,,, | Performed by: EMERGENCY MEDICINE

## 2021-09-20 PROCEDURE — 1159F PR MEDICATION LIST DOCUMENTED IN MEDICAL RECORD: ICD-10-PCS | Mod: CPTII,S$GLB,, | Performed by: NURSE PRACTITIONER

## 2021-09-20 PROCEDURE — 3080F PR MOST RECENT DIASTOLIC BLOOD PRESSURE >= 90 MM HG: ICD-10-PCS | Mod: CPTII,S$GLB,, | Performed by: NURSE PRACTITIONER

## 2021-09-20 PROCEDURE — 80047 POCT CHEMISTRY PANEL: ICD-10-PCS | Mod: QW,S$GLB,, | Performed by: NURSE PRACTITIONER

## 2021-09-20 PROCEDURE — 80047 BASIC METABLC PNL IONIZED CA: CPT | Mod: QW,S$GLB,, | Performed by: NURSE PRACTITIONER

## 2021-09-20 PROCEDURE — S0119 PR ONDANSETRON, ORAL, 4MG: ICD-10-PCS | Mod: S$GLB,,, | Performed by: EMERGENCY MEDICINE

## 2021-09-20 PROCEDURE — 3008F PR BODY MASS INDEX (BMI) DOCUMENTED: ICD-10-PCS | Mod: CPTII,S$GLB,, | Performed by: NURSE PRACTITIONER

## 2021-09-20 PROCEDURE — 3080F DIAST BP >= 90 MM HG: CPT | Mod: CPTII,S$GLB,, | Performed by: NURSE PRACTITIONER

## 2021-09-20 PROCEDURE — 25000003 PHARM REV CODE 250: Performed by: EMERGENCY MEDICINE

## 2021-09-20 PROCEDURE — 96374 THER/PROPH/DIAG INJ IV PUSH: CPT

## 2021-09-20 PROCEDURE — 83735 ASSAY OF MAGNESIUM: CPT | Performed by: EMERGENCY MEDICINE

## 2021-09-20 PROCEDURE — 80053 COMPREHEN METABOLIC PANEL: CPT | Performed by: EMERGENCY MEDICINE

## 2021-09-20 PROCEDURE — 99214 OFFICE O/P EST MOD 30 MIN: CPT | Mod: 25,S$GLB,, | Performed by: NURSE PRACTITIONER

## 2021-09-20 PROCEDURE — 1159F MED LIST DOCD IN RCRD: CPT | Mod: CPTII,S$GLB,, | Performed by: NURSE PRACTITIONER

## 2021-09-20 PROCEDURE — 3077F PR MOST RECENT SYSTOLIC BLOOD PRESSURE >= 140 MM HG: ICD-10-PCS | Mod: CPTII,S$GLB,, | Performed by: NURSE PRACTITIONER

## 2021-09-20 PROCEDURE — 96361 HYDRATE IV INFUSION ADD-ON: CPT

## 2021-09-20 PROCEDURE — 85025 COMPLETE CBC W/AUTO DIFF WBC: CPT | Performed by: EMERGENCY MEDICINE

## 2021-09-20 PROCEDURE — 1160F PR REVIEW ALL MEDS BY PRESCRIBER/CLIN PHARMACIST DOCUMENTED: ICD-10-PCS | Mod: CPTII,S$GLB,, | Performed by: NURSE PRACTITIONER

## 2021-09-20 PROCEDURE — 3077F SYST BP >= 140 MM HG: CPT | Mod: CPTII,S$GLB,, | Performed by: NURSE PRACTITIONER

## 2021-09-20 PROCEDURE — 99214 PR OFFICE/OUTPT VISIT, EST, LEVL IV, 30-39 MIN: ICD-10-PCS | Mod: 25,S$GLB,, | Performed by: NURSE PRACTITIONER

## 2021-09-20 PROCEDURE — 63600175 PHARM REV CODE 636 W HCPCS: Performed by: EMERGENCY MEDICINE

## 2021-09-20 PROCEDURE — 3008F BODY MASS INDEX DOCD: CPT | Mod: CPTII,S$GLB,, | Performed by: NURSE PRACTITIONER

## 2021-09-20 PROCEDURE — 96372 THER/PROPH/DIAG INJ SC/IM: CPT

## 2021-09-20 PROCEDURE — 74019 RADEX ABDOMEN 2 VIEWS: CPT | Mod: FY,S$GLB,, | Performed by: RADIOLOGY

## 2021-09-20 PROCEDURE — 96372 THER/PROPH/DIAG INJ SC/IM: CPT | Mod: S$GLB,,, | Performed by: NURSE PRACTITIONER

## 2021-09-20 PROCEDURE — 74019 XR ABDOMEN FLAT AND ERECT: ICD-10-PCS | Mod: FY,S$GLB,, | Performed by: RADIOLOGY

## 2021-09-20 PROCEDURE — 96372 PR INJECTION,THERAP/PROPH/DIAG2ST, IM OR SUBCUT: ICD-10-PCS | Mod: S$GLB,,, | Performed by: NURSE PRACTITIONER

## 2021-09-20 PROCEDURE — 99284 EMERGENCY DEPT VISIT MOD MDM: CPT | Mod: 25

## 2021-09-20 RX ORDER — ONDANSETRON 2 MG/ML
4 INJECTION INTRAMUSCULAR; INTRAVENOUS
Status: COMPLETED | OUTPATIENT
Start: 2021-09-20 | End: 2021-09-20

## 2021-09-20 RX ORDER — SODIUM CHLORIDE 9 MG/ML
INJECTION, SOLUTION INTRAVENOUS
Status: COMPLETED | OUTPATIENT
Start: 2021-09-20 | End: 2021-09-20

## 2021-09-20 RX ORDER — DICYCLOMINE HYDROCHLORIDE 10 MG/ML
20 INJECTION INTRAMUSCULAR
Status: COMPLETED | OUTPATIENT
Start: 2021-09-20 | End: 2021-09-20

## 2021-09-20 RX ORDER — HALOPERIDOL 5 MG/ML
5 INJECTION INTRAMUSCULAR
Status: COMPLETED | OUTPATIENT
Start: 2021-09-20 | End: 2021-09-20

## 2021-09-20 RX ORDER — ONDANSETRON 4 MG/1
4 TABLET, ORALLY DISINTEGRATING ORAL
Status: COMPLETED | OUTPATIENT
Start: 2021-09-20 | End: 2021-09-20

## 2021-09-20 RX ORDER — PROMETHAZINE HYDROCHLORIDE 25 MG/1
25 TABLET ORAL EVERY 6 HOURS PRN
Qty: 10 TABLET | Refills: 0 | Status: SHIPPED | OUTPATIENT
Start: 2021-09-20 | End: 2022-09-01

## 2021-09-20 RX ADMIN — HALOPERIDOL LACTATE 5 MG: 5 INJECTION, SOLUTION INTRAMUSCULAR at 10:09

## 2021-09-20 RX ADMIN — SODIUM CHLORIDE: 9 INJECTION, SOLUTION INTRAVENOUS at 05:09

## 2021-09-20 RX ADMIN — ONDANSETRON 4 MG: 4 TABLET, ORALLY DISINTEGRATING ORAL at 04:09

## 2021-09-20 RX ADMIN — DICYCLOMINE HYDROCHLORIDE 20 MG: 20 INJECTION, SOLUTION INTRAMUSCULAR at 10:09

## 2021-09-20 RX ADMIN — SODIUM CHLORIDE 1000 ML: 0.9 INJECTION, SOLUTION INTRAVENOUS at 10:09

## 2021-09-20 RX ADMIN — ONDANSETRON 4 MG: 2 INJECTION INTRAMUSCULAR; INTRAVENOUS at 05:09

## 2021-09-21 VITALS
HEART RATE: 72 BPM | SYSTOLIC BLOOD PRESSURE: 137 MMHG | RESPIRATION RATE: 18 BRPM | TEMPERATURE: 99 F | WEIGHT: 165 LBS | DIASTOLIC BLOOD PRESSURE: 79 MMHG | OXYGEN SATURATION: 100 % | BODY MASS INDEX: 22.35 KG/M2 | HEIGHT: 72 IN

## 2021-10-20 ENCOUNTER — HOSPITAL ENCOUNTER (EMERGENCY)
Facility: HOSPITAL | Age: 21
Discharge: ANOTHER HOSPITAL | End: 2021-10-20
Attending: EMERGENCY MEDICINE
Payer: COMMERCIAL

## 2021-10-20 ENCOUNTER — PATIENT MESSAGE (OUTPATIENT)
Dept: GASTROENTEROLOGY | Facility: CLINIC | Age: 21
End: 2021-10-20
Payer: COMMERCIAL

## 2021-10-20 VITALS
HEART RATE: 62 BPM | WEIGHT: 165 LBS | RESPIRATION RATE: 13 BRPM | BODY MASS INDEX: 22.35 KG/M2 | TEMPERATURE: 98 F | SYSTOLIC BLOOD PRESSURE: 109 MMHG | DIASTOLIC BLOOD PRESSURE: 59 MMHG | OXYGEN SATURATION: 98 % | HEIGHT: 72 IN

## 2021-10-20 DIAGNOSIS — F41.9 ANXIETY: Primary | ICD-10-CM

## 2021-10-20 DIAGNOSIS — R10.9 ABDOMINAL PAIN: ICD-10-CM

## 2021-10-20 DIAGNOSIS — R11.15 CYCLICAL VOMITING SYNDROME: Primary | ICD-10-CM

## 2021-10-20 LAB
ALBUMIN SERPL BCP-MCNC: 4.9 G/DL (ref 3.5–5.2)
ALP SERPL-CCNC: 102 U/L (ref 55–135)
ALT SERPL W/O P-5'-P-CCNC: 24 U/L (ref 10–44)
AMORPH CRY URNS QL MICRO: NORMAL
AMPHET+METHAMPHET UR QL: NEGATIVE
ANION GAP SERPL CALC-SCNC: 15 MMOL/L (ref 8–16)
AST SERPL-CCNC: 22 U/L (ref 10–40)
BARBITURATES UR QL SCN>200 NG/ML: NEGATIVE
BASOPHILS # BLD AUTO: 0.02 K/UL (ref 0–0.2)
BASOPHILS NFR BLD: 0.3 % (ref 0–1.9)
BENZODIAZ UR QL SCN>200 NG/ML: NEGATIVE
BILIRUB SERPL-MCNC: 0.6 MG/DL (ref 0.1–1)
BILIRUB UR QL STRIP: NEGATIVE
BUN SERPL-MCNC: 16 MG/DL (ref 6–20)
BZE UR QL SCN: NEGATIVE
CALCIUM SERPL-MCNC: 10.1 MG/DL (ref 8.7–10.5)
CANNABINOIDS UR QL SCN: NEGATIVE
CHLORIDE SERPL-SCNC: 104 MMOL/L (ref 95–110)
CLARITY UR: ABNORMAL
CO2 SERPL-SCNC: 19 MMOL/L (ref 23–29)
COLOR UR: YELLOW
CREAT SERPL-MCNC: 1.2 MG/DL (ref 0.5–1.4)
CREAT UR-MCNC: 93 MG/DL (ref 23–375)
DIFFERENTIAL METHOD: ABNORMAL
EOSINOPHIL # BLD AUTO: 0.1 K/UL (ref 0–0.5)
EOSINOPHIL NFR BLD: 1.4 % (ref 0–8)
ERYTHROCYTE [DISTWIDTH] IN BLOOD BY AUTOMATED COUNT: 12.2 % (ref 11.5–14.5)
EST. GFR  (AFRICAN AMERICAN): >60 ML/MIN/1.73 M^2
EST. GFR  (NON AFRICAN AMERICAN): >60 ML/MIN/1.73 M^2
GLUCOSE SERPL-MCNC: 146 MG/DL (ref 70–110)
GLUCOSE UR QL STRIP: NEGATIVE
HCT VFR BLD AUTO: 43.6 % (ref 40–54)
HGB BLD-MCNC: 15.9 G/DL (ref 14–18)
HGB UR QL STRIP: NEGATIVE
IMM GRANULOCYTES # BLD AUTO: 0.03 K/UL (ref 0–0.04)
IMM GRANULOCYTES NFR BLD AUTO: 0.4 % (ref 0–0.5)
KETONES UR QL STRIP: ABNORMAL
LEUKOCYTE ESTERASE UR QL STRIP: NEGATIVE
LIPASE SERPL-CCNC: 26 U/L (ref 4–60)
LYMPHOCYTES # BLD AUTO: 1.8 K/UL (ref 1–4.8)
LYMPHOCYTES NFR BLD: 22.7 % (ref 18–48)
MAGNESIUM SERPL-MCNC: 1.9 MG/DL (ref 1.6–2.6)
MCH RBC QN AUTO: 29.2 PG (ref 27–31)
MCHC RBC AUTO-ENTMCNC: 36.5 G/DL (ref 32–36)
MCV RBC AUTO: 80 FL (ref 82–98)
METHADONE UR QL SCN>300 NG/ML: NEGATIVE
MICROSCOPIC COMMENT: NORMAL
MONOCYTES # BLD AUTO: 0.7 K/UL (ref 0.3–1)
MONOCYTES NFR BLD: 9.2 % (ref 4–15)
NEUTROPHILS # BLD AUTO: 5.2 K/UL (ref 1.8–7.7)
NEUTROPHILS NFR BLD: 66 % (ref 38–73)
NITRITE UR QL STRIP: NEGATIVE
NRBC BLD-RTO: 0 /100 WBC
OPIATES UR QL SCN: NEGATIVE
PCP UR QL SCN>25 NG/ML: NEGATIVE
PH UR STRIP: 8 [PH] (ref 5–8)
PLATELET # BLD AUTO: 169 K/UL (ref 150–450)
PMV BLD AUTO: 9.1 FL (ref 9.2–12.9)
POTASSIUM SERPL-SCNC: 3.4 MMOL/L (ref 3.5–5.1)
PROT SERPL-MCNC: 7.3 G/DL (ref 6–8.4)
PROT UR QL STRIP: NEGATIVE
RBC # BLD AUTO: 5.44 M/UL (ref 4.6–6.2)
RBC #/AREA URNS HPF: 3 /HPF (ref 0–4)
SODIUM SERPL-SCNC: 138 MMOL/L (ref 136–145)
SP GR UR STRIP: 1.01 (ref 1–1.03)
TOXICOLOGY INFORMATION: NORMAL
URN SPEC COLLECT METH UR: ABNORMAL
UROBILINOGEN UR STRIP-ACNC: NEGATIVE EU/DL
WBC # BLD AUTO: 7.8 K/UL (ref 3.9–12.7)

## 2021-10-20 PROCEDURE — 96361 HYDRATE IV INFUSION ADD-ON: CPT

## 2021-10-20 PROCEDURE — 99284 EMERGENCY DEPT VISIT MOD MDM: CPT | Mod: 25

## 2021-10-20 PROCEDURE — 25000003 PHARM REV CODE 250: Performed by: EMERGENCY MEDICINE

## 2021-10-20 PROCEDURE — 93010 EKG 12-LEAD: ICD-10-PCS | Mod: ,,, | Performed by: INTERNAL MEDICINE

## 2021-10-20 PROCEDURE — 83690 ASSAY OF LIPASE: CPT | Performed by: EMERGENCY MEDICINE

## 2021-10-20 PROCEDURE — 93010 ELECTROCARDIOGRAM REPORT: CPT | Mod: ,,, | Performed by: INTERNAL MEDICINE

## 2021-10-20 PROCEDURE — 96375 TX/PRO/DX INJ NEW DRUG ADDON: CPT

## 2021-10-20 PROCEDURE — 80307 DRUG TEST PRSMV CHEM ANLYZR: CPT | Performed by: EMERGENCY MEDICINE

## 2021-10-20 PROCEDURE — 85025 COMPLETE CBC W/AUTO DIFF WBC: CPT | Performed by: EMERGENCY MEDICINE

## 2021-10-20 PROCEDURE — 80053 COMPREHEN METABOLIC PANEL: CPT | Performed by: EMERGENCY MEDICINE

## 2021-10-20 PROCEDURE — 81000 URINALYSIS NONAUTO W/SCOPE: CPT | Mod: 59 | Performed by: EMERGENCY MEDICINE

## 2021-10-20 PROCEDURE — 63600175 PHARM REV CODE 636 W HCPCS: Performed by: EMERGENCY MEDICINE

## 2021-10-20 PROCEDURE — 96374 THER/PROPH/DIAG INJ IV PUSH: CPT

## 2021-10-20 PROCEDURE — 93005 ELECTROCARDIOGRAM TRACING: CPT

## 2021-10-20 PROCEDURE — 83735 ASSAY OF MAGNESIUM: CPT | Performed by: EMERGENCY MEDICINE

## 2021-10-20 RX ORDER — HALOPERIDOL 5 MG/ML
5 INJECTION INTRAMUSCULAR
Status: DISCONTINUED | OUTPATIENT
Start: 2021-10-20 | End: 2021-10-20

## 2021-10-20 RX ORDER — HALOPERIDOL 5 MG/ML
5 INJECTION INTRAMUSCULAR
Status: COMPLETED | OUTPATIENT
Start: 2021-10-20 | End: 2021-10-20

## 2021-10-20 RX ADMIN — SODIUM CHLORIDE 1000 ML: 0.9 INJECTION, SOLUTION INTRAVENOUS at 03:10

## 2021-10-20 RX ADMIN — HALOPERIDOL LACTATE 5 MG: 5 INJECTION, SOLUTION INTRAMUSCULAR at 01:10

## 2021-10-20 RX ADMIN — LORAZEPAM 1 MG: 2 INJECTION INTRAMUSCULAR; INTRAVENOUS at 01:10

## 2021-10-25 ENCOUNTER — PATIENT OUTREACH (OUTPATIENT)
Dept: ADMINISTRATIVE | Facility: OTHER | Age: 21
End: 2021-10-25
Payer: COMMERCIAL

## 2021-10-25 ENCOUNTER — HOSPITAL ENCOUNTER (OUTPATIENT)
Dept: RADIOLOGY | Facility: HOSPITAL | Age: 21
Discharge: HOME | End: 2021-10-25
Attending: ORTHOPAEDIC SURGERY
Payer: COMMERCIAL

## 2021-10-25 ENCOUNTER — OFFICE VISIT (OUTPATIENT)
Dept: SPORTS MEDICINE | Facility: CLINIC | Age: 21
End: 2021-10-25
Payer: COMMERCIAL

## 2021-10-25 VITALS
BODY MASS INDEX: 22.35 KG/M2 | HEART RATE: 102 BPM | HEIGHT: 72 IN | SYSTOLIC BLOOD PRESSURE: 159 MMHG | WEIGHT: 165 LBS | DIASTOLIC BLOOD PRESSURE: 102 MMHG

## 2021-10-25 DIAGNOSIS — M25.521 RIGHT ELBOW PAIN: Primary | ICD-10-CM

## 2021-10-25 DIAGNOSIS — M25.521 RIGHT ELBOW PAIN: ICD-10-CM

## 2021-10-25 PROCEDURE — 73080 XR ELBOW COMPLETE 3 VIEW RIGHT: ICD-10-PCS | Mod: 26,RT,, | Performed by: RADIOLOGY

## 2021-10-25 PROCEDURE — 73080 X-RAY EXAM OF ELBOW: CPT | Mod: TC,RT

## 2021-10-25 PROCEDURE — 3077F SYST BP >= 140 MM HG: CPT | Mod: CPTII,S$GLB,, | Performed by: ORTHOPAEDIC SURGERY

## 2021-10-25 PROCEDURE — 99999 PR PBB SHADOW E&M-EST. PATIENT-LVL III: CPT | Mod: PBBFAC,,, | Performed by: ORTHOPAEDIC SURGERY

## 2021-10-25 PROCEDURE — 99999 PR PBB SHADOW E&M-EST. PATIENT-LVL III: ICD-10-PCS | Mod: PBBFAC,,, | Performed by: ORTHOPAEDIC SURGERY

## 2021-10-25 PROCEDURE — 3080F PR MOST RECENT DIASTOLIC BLOOD PRESSURE >= 90 MM HG: ICD-10-PCS | Mod: CPTII,S$GLB,, | Performed by: ORTHOPAEDIC SURGERY

## 2021-10-25 PROCEDURE — 99203 PR OFFICE/OUTPT VISIT, NEW, LEVL III, 30-44 MIN: ICD-10-PCS | Mod: S$GLB,,, | Performed by: ORTHOPAEDIC SURGERY

## 2021-10-25 PROCEDURE — 73080 X-RAY EXAM OF ELBOW: CPT | Mod: 26,RT,, | Performed by: RADIOLOGY

## 2021-10-25 PROCEDURE — 3008F BODY MASS INDEX DOCD: CPT | Mod: CPTII,S$GLB,, | Performed by: ORTHOPAEDIC SURGERY

## 2021-10-25 PROCEDURE — 99203 OFFICE O/P NEW LOW 30 MIN: CPT | Mod: S$GLB,,, | Performed by: ORTHOPAEDIC SURGERY

## 2021-10-25 PROCEDURE — 3080F DIAST BP >= 90 MM HG: CPT | Mod: CPTII,S$GLB,, | Performed by: ORTHOPAEDIC SURGERY

## 2021-10-25 PROCEDURE — 3008F PR BODY MASS INDEX (BMI) DOCUMENTED: ICD-10-PCS | Mod: CPTII,S$GLB,, | Performed by: ORTHOPAEDIC SURGERY

## 2021-10-25 PROCEDURE — 3077F PR MOST RECENT SYSTOLIC BLOOD PRESSURE >= 140 MM HG: ICD-10-PCS | Mod: CPTII,S$GLB,, | Performed by: ORTHOPAEDIC SURGERY

## 2021-11-01 ENCOUNTER — CLINICAL SUPPORT (OUTPATIENT)
Dept: REHABILITATION | Facility: HOSPITAL | Age: 21
End: 2021-11-01
Payer: COMMERCIAL

## 2021-11-01 DIAGNOSIS — M25.521 RIGHT ELBOW PAIN: ICD-10-CM

## 2021-11-01 PROCEDURE — 97165 OT EVAL LOW COMPLEX 30 MIN: CPT

## 2021-11-01 PROCEDURE — 97110 THERAPEUTIC EXERCISES: CPT

## 2021-11-08 ENCOUNTER — CLINICAL SUPPORT (OUTPATIENT)
Dept: REHABILITATION | Facility: HOSPITAL | Age: 21
End: 2021-11-08
Payer: COMMERCIAL

## 2021-11-08 DIAGNOSIS — M25.521 RIGHT ELBOW PAIN: ICD-10-CM

## 2021-11-08 PROCEDURE — 97140 MANUAL THERAPY 1/> REGIONS: CPT

## 2021-11-08 PROCEDURE — 97110 THERAPEUTIC EXERCISES: CPT

## 2021-11-15 ENCOUNTER — CLINICAL SUPPORT (OUTPATIENT)
Dept: REHABILITATION | Facility: HOSPITAL | Age: 21
End: 2021-11-15
Payer: COMMERCIAL

## 2021-11-15 DIAGNOSIS — M25.521 RIGHT ELBOW PAIN: ICD-10-CM

## 2021-11-15 PROCEDURE — 97110 THERAPEUTIC EXERCISES: CPT

## 2021-11-15 PROCEDURE — 97140 MANUAL THERAPY 1/> REGIONS: CPT

## 2021-11-22 ENCOUNTER — CLINICAL SUPPORT (OUTPATIENT)
Dept: REHABILITATION | Facility: HOSPITAL | Age: 21
End: 2021-11-22
Payer: COMMERCIAL

## 2021-11-22 DIAGNOSIS — M25.521 RIGHT ELBOW PAIN: ICD-10-CM

## 2021-11-22 PROCEDURE — 97140 MANUAL THERAPY 1/> REGIONS: CPT

## 2021-11-22 PROCEDURE — 97110 THERAPEUTIC EXERCISES: CPT

## 2021-11-29 ENCOUNTER — CLINICAL SUPPORT (OUTPATIENT)
Dept: REHABILITATION | Facility: HOSPITAL | Age: 21
End: 2021-11-29
Payer: COMMERCIAL

## 2021-11-29 DIAGNOSIS — M25.521 RIGHT ELBOW PAIN: ICD-10-CM

## 2021-11-29 PROCEDURE — 97110 THERAPEUTIC EXERCISES: CPT

## 2021-11-29 PROCEDURE — 97140 MANUAL THERAPY 1/> REGIONS: CPT

## 2021-12-06 ENCOUNTER — CLINICAL SUPPORT (OUTPATIENT)
Dept: REHABILITATION | Facility: HOSPITAL | Age: 21
End: 2021-12-06
Payer: COMMERCIAL

## 2021-12-06 DIAGNOSIS — M25.521 RIGHT ELBOW PAIN: ICD-10-CM

## 2021-12-06 PROCEDURE — 97140 MANUAL THERAPY 1/> REGIONS: CPT

## 2021-12-06 PROCEDURE — 97110 THERAPEUTIC EXERCISES: CPT

## 2021-12-13 ENCOUNTER — CLINICAL SUPPORT (OUTPATIENT)
Dept: REHABILITATION | Facility: HOSPITAL | Age: 21
End: 2021-12-13
Payer: COMMERCIAL

## 2021-12-13 DIAGNOSIS — M25.521 RIGHT ELBOW PAIN: ICD-10-CM

## 2021-12-13 PROCEDURE — 97110 THERAPEUTIC EXERCISES: CPT

## 2021-12-13 PROCEDURE — 97140 MANUAL THERAPY 1/> REGIONS: CPT

## 2021-12-20 ENCOUNTER — CLINICAL SUPPORT (OUTPATIENT)
Dept: REHABILITATION | Facility: HOSPITAL | Age: 21
End: 2021-12-20
Payer: COMMERCIAL

## 2021-12-20 DIAGNOSIS — M25.521 RIGHT ELBOW PAIN: ICD-10-CM

## 2021-12-20 PROCEDURE — 97110 THERAPEUTIC EXERCISES: CPT

## 2021-12-20 PROCEDURE — 97140 MANUAL THERAPY 1/> REGIONS: CPT

## 2022-01-12 ENCOUNTER — PATIENT MESSAGE (OUTPATIENT)
Dept: INTERNAL MEDICINE | Facility: CLINIC | Age: 22
End: 2022-01-12
Payer: COMMERCIAL

## 2022-01-20 NOTE — TELEPHONE ENCOUNTER
TO MY TEAM:   Please contact Arian and relay message below.   Schedule him for Nurse Visit for blood pressure check ASAP.   Schedule Arian for next available regular appointment with me.    Thanks!  --------------------------------------------------------------------------------  --------------------------------------------------------------------------------   Arian Nieves.    I'm sorry to hear that you had another cyclical vomiting attack while on vacation. I'm sure that was miserable.    Regarding your low oxygen readings...I question the accuracy of your pulse oximeter.    Except for people with severe chronic lung disease (like emphysema), anyone whose oxygen saturation was in the 70-80% range is going to feel desperately short of breath.    We need to address your high blood pressure, which is likely unrelated. Please schedule an appointment with me ASAP.    All the best,    THIERRY Jo MD

## 2022-02-22 ENCOUNTER — PATIENT MESSAGE (OUTPATIENT)
Dept: RESEARCH | Facility: HOSPITAL | Age: 22
End: 2022-02-22
Payer: COMMERCIAL

## 2022-03-10 ENCOUNTER — PATIENT MESSAGE (OUTPATIENT)
Dept: INTERNAL MEDICINE | Facility: CLINIC | Age: 22
End: 2022-03-10

## 2022-03-10 ENCOUNTER — OFFICE VISIT (OUTPATIENT)
Dept: INTERNAL MEDICINE | Facility: CLINIC | Age: 22
End: 2022-03-10
Payer: COMMERCIAL

## 2022-03-10 VITALS
SYSTOLIC BLOOD PRESSURE: 118 MMHG | OXYGEN SATURATION: 98 % | WEIGHT: 156 LBS | DIASTOLIC BLOOD PRESSURE: 70 MMHG | HEIGHT: 72 IN | BODY MASS INDEX: 21.13 KG/M2 | HEART RATE: 100 BPM

## 2022-03-10 DIAGNOSIS — J06.9 VIRAL URI: Primary | ICD-10-CM

## 2022-03-10 LAB
INFLUENZA A, MOLECULAR: POSITIVE
INFLUENZA B, MOLECULAR: NEGATIVE
SPECIMEN SOURCE: ABNORMAL

## 2022-03-10 PROCEDURE — 99999 PR PBB SHADOW E&M-EST. PATIENT-LVL IV: ICD-10-PCS | Mod: PBBFAC,,, | Performed by: INTERNAL MEDICINE

## 2022-03-10 PROCEDURE — 87081 CULTURE SCREEN ONLY: CPT | Performed by: INTERNAL MEDICINE

## 2022-03-10 PROCEDURE — 1159F MED LIST DOCD IN RCRD: CPT | Mod: CPTII,S$GLB,, | Performed by: INTERNAL MEDICINE

## 2022-03-10 PROCEDURE — 87502 INFLUENZA DNA AMP PROBE: CPT | Performed by: INTERNAL MEDICINE

## 2022-03-10 PROCEDURE — 3074F PR MOST RECENT SYSTOLIC BLOOD PRESSURE < 130 MM HG: ICD-10-PCS | Mod: CPTII,S$GLB,, | Performed by: INTERNAL MEDICINE

## 2022-03-10 PROCEDURE — 99999 PR PBB SHADOW E&M-EST. PATIENT-LVL IV: CPT | Mod: PBBFAC,,, | Performed by: INTERNAL MEDICINE

## 2022-03-10 PROCEDURE — 1160F PR REVIEW ALL MEDS BY PRESCRIBER/CLIN PHARMACIST DOCUMENTED: ICD-10-PCS | Mod: CPTII,S$GLB,, | Performed by: INTERNAL MEDICINE

## 2022-03-10 PROCEDURE — 3074F SYST BP LT 130 MM HG: CPT | Mod: CPTII,S$GLB,, | Performed by: INTERNAL MEDICINE

## 2022-03-10 PROCEDURE — 1159F PR MEDICATION LIST DOCUMENTED IN MEDICAL RECORD: ICD-10-PCS | Mod: CPTII,S$GLB,, | Performed by: INTERNAL MEDICINE

## 2022-03-10 PROCEDURE — 3078F DIAST BP <80 MM HG: CPT | Mod: CPTII,S$GLB,, | Performed by: INTERNAL MEDICINE

## 2022-03-10 PROCEDURE — 99213 PR OFFICE/OUTPT VISIT, EST, LEVL III, 20-29 MIN: ICD-10-PCS | Mod: S$GLB,,, | Performed by: INTERNAL MEDICINE

## 2022-03-10 PROCEDURE — 3008F BODY MASS INDEX DOCD: CPT | Mod: CPTII,S$GLB,, | Performed by: INTERNAL MEDICINE

## 2022-03-10 PROCEDURE — 1160F RVW MEDS BY RX/DR IN RCRD: CPT | Mod: CPTII,S$GLB,, | Performed by: INTERNAL MEDICINE

## 2022-03-10 PROCEDURE — 3008F PR BODY MASS INDEX (BMI) DOCUMENTED: ICD-10-PCS | Mod: CPTII,S$GLB,, | Performed by: INTERNAL MEDICINE

## 2022-03-10 PROCEDURE — 99213 OFFICE O/P EST LOW 20 MIN: CPT | Mod: S$GLB,,, | Performed by: INTERNAL MEDICINE

## 2022-03-10 PROCEDURE — 3078F PR MOST RECENT DIASTOLIC BLOOD PRESSURE < 80 MM HG: ICD-10-PCS | Mod: CPTII,S$GLB,, | Performed by: INTERNAL MEDICINE

## 2022-03-10 RX ORDER — NORTRIPTYLINE HYDROCHLORIDE 10 MG/1
10 CAPSULE ORAL DAILY
COMMUNITY
Start: 2022-01-11 | End: 2022-09-01

## 2022-03-10 RX ORDER — OSELTAMIVIR PHOSPHATE 75 MG/1
75 CAPSULE ORAL 2 TIMES DAILY
Qty: 10 CAPSULE | Refills: 0 | Status: SHIPPED | OUTPATIENT
Start: 2022-03-10 | End: 2022-03-15

## 2022-03-10 NOTE — PROGRESS NOTES
21-year-old male  The past 3 days has been having the headache which is improved.  Body joint aches.  Some congestion and chest wall no clear mucus dry cough.  He irritated throat.  He feels like his fever but did not check his temperature.    He was diagnosed with COVID February 2011 but was manifested by metallic taste in the mouth.    Medical history  Abdominal migraine/ cyclical nausea vomiting syndrome    Medications  Janneth 10 mg q.h.s.  Phenergan as needed    Examination  Weight 156  Pulse 116  Blood pressure 118/70  Temperature 101.2  Pulse ox 98%  Tympanic membranes minimally hyperemic  Nasal mucosa is congested  Oropharynx hyperemic not erythematous   Neck no palpable adenopathy  Chest clear breath sounds  Heart tachycardia no murmurs gallops    Impression  Viral syndrome/viral upper respiratory infection    Plan  Throat swab for strep/ nasal swab for influenza  Tylenol extra-strength as needed

## 2022-03-13 LAB — BACTERIA THROAT CULT: NORMAL

## 2022-03-14 RX ORDER — AZITHROMYCIN 250 MG/1
TABLET, FILM COATED ORAL
Qty: 6 TABLET | Refills: 0 | Status: SHIPPED | OUTPATIENT
Start: 2022-03-14 | End: 2022-09-01

## 2022-09-01 ENCOUNTER — OFFICE VISIT (OUTPATIENT)
Dept: INTERNAL MEDICINE | Facility: CLINIC | Age: 22
End: 2022-09-01
Payer: COMMERCIAL

## 2022-09-01 VITALS
DIASTOLIC BLOOD PRESSURE: 84 MMHG | WEIGHT: 162.5 LBS | BODY MASS INDEX: 22.01 KG/M2 | OXYGEN SATURATION: 97 % | SYSTOLIC BLOOD PRESSURE: 136 MMHG | HEART RATE: 107 BPM | TEMPERATURE: 98 F | HEIGHT: 72 IN

## 2022-09-01 DIAGNOSIS — Z23 NEED FOR COVID-19 VACCINE: ICD-10-CM

## 2022-09-01 DIAGNOSIS — R73.09 ELEVATED GLUCOSE: ICD-10-CM

## 2022-09-01 DIAGNOSIS — Z11.3 ROUTINE SCREENING FOR STI (SEXUALLY TRANSMITTED INFECTION): ICD-10-CM

## 2022-09-01 DIAGNOSIS — Z00.00 PREVENTATIVE HEALTH CARE: Primary | ICD-10-CM

## 2022-09-01 DIAGNOSIS — Z23 NEED FOR HPV VACCINATION: ICD-10-CM

## 2022-09-01 DIAGNOSIS — Z11.4 SCREENING FOR HIV WITHOUT PRESENCE OF RISK FACTORS: ICD-10-CM

## 2022-09-01 DIAGNOSIS — H61.21 EXCESSIVE CERUMEN IN RIGHT EAR CANAL: ICD-10-CM

## 2022-09-01 DIAGNOSIS — Z13.220 SCREENING FOR LIPID DISORDERS: ICD-10-CM

## 2022-09-01 DIAGNOSIS — Z23 NEED FOR INFLUENZA VACCINATION: ICD-10-CM

## 2022-09-01 DIAGNOSIS — Z23 NEED FOR TD VACCINE: ICD-10-CM

## 2022-09-01 DIAGNOSIS — I48.0 PAF (PAROXYSMAL ATRIAL FIBRILLATION): ICD-10-CM

## 2022-09-01 DIAGNOSIS — R79.89 ELEVATED SERUM CREATININE: ICD-10-CM

## 2022-09-01 PROCEDURE — 99999 PR PBB SHADOW E&M-EST. PATIENT-LVL V: CPT | Mod: PBBFAC,,, | Performed by: FAMILY MEDICINE

## 2022-09-01 PROCEDURE — 69210 PR REMOVAL IMPACTED CERUMEN REQUIRING INSTRUMENTATION, UNILATERAL: ICD-10-PCS | Mod: S$GLB,,, | Performed by: FAMILY MEDICINE

## 2022-09-01 PROCEDURE — 90714 TD VACC NO PRESV 7 YRS+ IM: CPT | Mod: S$GLB,,, | Performed by: FAMILY MEDICINE

## 2022-09-01 PROCEDURE — 99395 PR PREVENTIVE VISIT,EST,18-39: ICD-10-PCS | Mod: 25,S$GLB,, | Performed by: FAMILY MEDICINE

## 2022-09-01 PROCEDURE — 80069 RENAL FUNCTION PANEL: CPT | Performed by: FAMILY MEDICINE

## 2022-09-01 PROCEDURE — 90651 HPV VACCINE 9-VALENT 3 DOSE IM: ICD-10-PCS | Mod: S$GLB,,, | Performed by: FAMILY MEDICINE

## 2022-09-01 PROCEDURE — 87591 N.GONORRHOEAE DNA AMP PROB: CPT | Performed by: FAMILY MEDICINE

## 2022-09-01 PROCEDURE — 90472 IMMUNIZATION ADMIN EACH ADD: CPT | Mod: S$GLB,,, | Performed by: FAMILY MEDICINE

## 2022-09-01 PROCEDURE — 69210 REMOVE IMPACTED EAR WAX UNI: CPT | Mod: S$GLB,,, | Performed by: FAMILY MEDICINE

## 2022-09-01 PROCEDURE — 90686 FLU VACCINE (QUAD) GREATER THAN OR EQUAL TO 3YO PRESERVATIVE FREE IM: ICD-10-PCS | Mod: S$GLB,,, | Performed by: FAMILY MEDICINE

## 2022-09-01 PROCEDURE — 87491 CHLMYD TRACH DNA AMP PROBE: CPT | Performed by: FAMILY MEDICINE

## 2022-09-01 PROCEDURE — 90714 TD VACCINE GREATER THAN OR EQUAL TO 7YO PRESERVATIVE FREE IM: ICD-10-PCS | Mod: S$GLB,,, | Performed by: FAMILY MEDICINE

## 2022-09-01 PROCEDURE — 90471 IMMUNIZATION ADMIN: CPT | Mod: S$GLB,,, | Performed by: FAMILY MEDICINE

## 2022-09-01 PROCEDURE — 86592 SYPHILIS TEST NON-TREP QUAL: CPT | Performed by: FAMILY MEDICINE

## 2022-09-01 PROCEDURE — 3008F BODY MASS INDEX DOCD: CPT | Mod: CPTII,S$GLB,, | Performed by: FAMILY MEDICINE

## 2022-09-01 PROCEDURE — 3008F PR BODY MASS INDEX (BMI) DOCUMENTED: ICD-10-PCS | Mod: CPTII,S$GLB,, | Performed by: FAMILY MEDICINE

## 2022-09-01 PROCEDURE — 3044F HG A1C LEVEL LT 7.0%: CPT | Mod: CPTII,S$GLB,, | Performed by: FAMILY MEDICINE

## 2022-09-01 PROCEDURE — 90686 IIV4 VACC NO PRSV 0.5 ML IM: CPT | Mod: S$GLB,,, | Performed by: FAMILY MEDICINE

## 2022-09-01 PROCEDURE — 90651 9VHPV VACCINE 2/3 DOSE IM: CPT | Mod: S$GLB,,, | Performed by: FAMILY MEDICINE

## 2022-09-01 PROCEDURE — 87389 HIV-1 AG W/HIV-1&-2 AB AG IA: CPT | Performed by: FAMILY MEDICINE

## 2022-09-01 PROCEDURE — 90472 TD VACCINE GREATER THAN OR EQUAL TO 7YO PRESERVATIVE FREE IM: ICD-10-PCS | Mod: S$GLB,,, | Performed by: FAMILY MEDICINE

## 2022-09-01 PROCEDURE — 83036 HEMOGLOBIN GLYCOSYLATED A1C: CPT | Performed by: FAMILY MEDICINE

## 2022-09-01 PROCEDURE — 80061 LIPID PANEL: CPT | Performed by: FAMILY MEDICINE

## 2022-09-01 PROCEDURE — 90471 FLU VACCINE (QUAD) GREATER THAN OR EQUAL TO 3YO PRESERVATIVE FREE IM: ICD-10-PCS | Mod: S$GLB,,, | Performed by: FAMILY MEDICINE

## 2022-09-01 PROCEDURE — 1159F PR MEDICATION LIST DOCUMENTED IN MEDICAL RECORD: ICD-10-PCS | Mod: CPTII,S$GLB,, | Performed by: FAMILY MEDICINE

## 2022-09-01 PROCEDURE — 3044F PR MOST RECENT HEMOGLOBIN A1C LEVEL <7.0%: ICD-10-PCS | Mod: CPTII,S$GLB,, | Performed by: FAMILY MEDICINE

## 2022-09-01 PROCEDURE — 1159F MED LIST DOCD IN RCRD: CPT | Mod: CPTII,S$GLB,, | Performed by: FAMILY MEDICINE

## 2022-09-01 PROCEDURE — 99395 PREV VISIT EST AGE 18-39: CPT | Mod: 25,S$GLB,, | Performed by: FAMILY MEDICINE

## 2022-09-01 PROCEDURE — 99999 PR PBB SHADOW E&M-EST. PATIENT-LVL V: ICD-10-PCS | Mod: PBBFAC,,, | Performed by: FAMILY MEDICINE

## 2022-09-01 NOTE — PROGRESS NOTES
WELLNESS VISIT (PREVENTIVE SERVICES)  9/1/22  2:30 PM CDT    HEALTH MAINTENANCE INTERVENTIONS - UP TO DATE  Health Maintenance Topics with due status: Not Due       Topic Last Completion Date    TETANUS VACCINE 09/01/2022       HEALTH MAINTENANCE INTERVENTIONS - DUE OR DUE SOON  Health Maintenance Due   Topic Date Due    COVID-19 Vaccine (3 - Booster for Pfizer series) 08/12/2021       REASON FOR VISIT: Annual Wellness Visit (Preventive Services)  CHIEF COMPLAINT: Annual Exam     DIAGNOSES SPECIFICALLY EVALUATED AND TREATED THIS ENCOUNTER  1. Preventative health care  - Lipid Panel  - HIV 1/2 Ag/Ab (4th Gen)  - C. trachomatis/N. gonorrhoeae by AMP DNA [facility]; Urine  - RPR  - Td Vaccine (Adult) - Preservative Free  - (In Office Administered) HPV Vaccine (9-Valent) (3 Dose) (IM)    2. PAF (paroxysmal atrial fibrillation)  - Ambulatory referral/consult to Electrophysiology; Future    3. Screening for HIV without presence of risk factors  - HIV 1/2 Ag/Ab (4th Gen)    4. Routine screening for STI (sexually transmitted infection)  - C. trachomatis/N. gonorrhoeae by AMP DNA [facility]; Urine  - RPR    5. Screening for lipid disorders  - Lipid Panel    6. Need for influenza vaccination    7. Need for COVID-19 vaccine    8. Need for Td vaccine  - Td Vaccine (Adult) - Preservative Free    9. Need for HPV vaccination  - (In Office Administered) HPV Vaccine (9-Valent) (3 Dose) (IM)    10. Elevated glucose  - Hemoglobin A1C    11. Elevated serum creatinine  - Renal Function Panel    12. Excessive cerumen in right ear canal     --------------------------------------------------------------------------------   [facility] (MRN [id number], APPT: 9/1/22  2:30 PM CDT)  --------------------------------  Ready to check out. See orders.  Give immunizations ordered plus flu shot.  Clinic collect labs.  Right ear irrigation.  Call-back annual wellness visit with me in 1 year.  Schedule other appointments as  able.  Thanks.  --------------------------------------------------------------------------------   Follow up in about 1 year (around 9/1/2023) for wellness and preventive services.   No future appointments.  Problem List Items Addressed This Visit       PAF (paroxysmal atrial fibrillation)    Overview     He had about 90 minutes of rapid AF in the ED in May 2021 during a presentation for nausea.         Relevant Orders    Ambulatory referral/consult to Electrophysiology     Other Visit Diagnoses       Preventative health care    -  Primary    Relevant Orders    Lipid Panel (Completed)    HIV 1/2 Ag/Ab (4th Gen) (Completed)    C. trachomatis/N. gonorrhoeae by AMP DNA [facility]; Urine (Completed)    RPR (Completed)    Td Vaccine (Adult) - Preservative Free (Completed)    (In Office Administered) HPV Vaccine (9-Valent) (3 Dose) (IM) (Completed)    Screening for HIV without presence of risk factors        Relevant Orders    HIV 1/2 Ag/Ab (4th Gen) (Completed)    Routine screening for STI (sexually transmitted infection)        Relevant Orders    C. trachomatis/N. gonorrhoeae by AMP DNA [facility]; Urine (Completed)    RPR (Completed)    Screening for lipid disorders        Relevant Orders    Lipid Panel (Completed)    Need for influenza vaccination        Need for COVID-19 vaccine        Need for Td vaccine        Relevant Orders    Td Vaccine (Adult) - Preservative Free (Completed)    Need for HPV vaccination        Relevant Orders    (In Office Administered) HPV Vaccine (9-Valent) (3 Dose) (IM) (Completed)    Elevated glucose        Relevant Orders    Hemoglobin A1C (Completed)    Elevated serum creatinine        Relevant Orders    Renal Function Panel (Completed)    Excessive cerumen in right ear canal            Unless noted herein, any chronic conditions are represented as and appear compensated/controlled and stable, and no other significant complaints or concerns were reported.    PRESCRIPTION DRUG  MANAGEMENT  Outpatient Medications Prior to Visit   Medication Sig Dispense Refill    azithromycin (Z-NICK) 250 MG tablet As directed 6 tablet 0    colestipoL (COLESTID) 1 gram Tab Take 1 tablet (1 g total) by mouth 2 (two) times daily. Take as needed for diarrhea (Patient not taking: No sig reported) 60 tablet 3    fluticasone propionate (FLONASE) 50 mcg/actuation nasal spray INSTILL 1 SPRAY IN EACH NOSTRIL ROUTE ONCE DAILY. (Patient not taking: No sig reported) 16 mL 0    levocetirizine (XYZAL) 5 MG tablet Take 1 tablet (5 mg total) by mouth every evening. (Patient not taking: No sig reported) 90 tablet 1    nortriptyline (PAMELOR) 10 MG capsule Take 10 mg by mouth once daily.      omeprazole (PRILOSEC) 40 MG capsule Take 1 capsule (40 mg total) by mouth every morning. (Patient not taking: No sig reported) 30 capsule 3    ondansetron (ZOFRAN-ODT) 4 MG TbDL Take 1 tablet (4 mg total) by mouth every 8 (eight) hours as needed. (Patient not taking: No sig reported) 20 tablet 3    promethazine (PHENERGAN) 25 MG tablet Take 1 tablet (25 mg total) by mouth every 6 (six) hours as needed for Nausea. (Patient not taking: No sig reported) 6 tablet 0    promethazine (PHENERGAN) 25 MG tablet Take 1 tablet (25 mg total) by mouth every 6 (six) hours as needed for Nausea. (Patient not taking: No sig reported) 10 tablet 0    SUMAtriptan (IMITREX) 5 mg/actuation nasal spray TAKE AT ONSET OF MIGRAINE, CAN REPEAT IN 2 HRS IF NEEDED. NO MORE THAN TWICE PER DAY OR 3 DAYS/WK. (Patient not taking: No sig reported) 6 each 6    traMADoL (ULTRAM) 50 mg tablet Take 1 tablet (50 mg total) by mouth every 6 (six) hours as needed for Pain. (Patient not taking: No sig reported) 12 tablet 0     No facility-administered medications prior to visit.     Medications Discontinued During This Encounter   Medication Reason    colestipoL (COLESTID) 1 gram Tab Patient no longer taking    fluticasone propionate (FLONASE) 50 mcg/actuation nasal spray Patient  no longer taking    levocetirizine (XYZAL) 5 MG tablet Patient no longer taking    nortriptyline (PAMELOR) 10 MG capsule Patient no longer taking    omeprazole (PRILOSEC) 40 MG capsule Patient no longer taking    ondansetron (ZOFRAN-ODT) 4 MG TbDL Patient no longer taking    promethazine (PHENERGAN) 25 MG tablet Patient no longer taking    promethazine (PHENERGAN) 25 MG tablet Patient no longer taking    SUMAtriptan (IMITREX) 5 mg/actuation nasal spray Patient no longer taking    traMADoL (ULTRAM) 50 mg tablet Patient no longer taking    azithromycin (Z-NICK) 250 MG tablet Patient no longer taking    colestipoL (COLESTID) 1 gram Tab Patient no longer taking    fluticasone propionate (FLONASE) 50 mcg/actuation nasal spray Patient no longer taking    levocetirizine (XYZAL) 5 MG tablet Patient no longer taking    nortriptyline (PAMELOR) 10 MG capsule Patient no longer taking    omeprazole (PRILOSEC) 40 MG capsule Patient no longer taking    ondansetron (ZOFRAN-ODT) 4 MG TbDL Patient no longer taking    promethazine (PHENERGAN) 25 MG tablet Patient no longer taking    promethazine (PHENERGAN) 25 MG tablet Patient no longer taking    SUMAtriptan (IMITREX) 5 mg/actuation nasal spray Patient no longer taking    traMADoL (ULTRAM) 50 mg tablet Patient no longer taking      Review of Systems   Constitutional:  Negative for activity change and unexpected weight change.   HENT:  Negative for hearing loss, rhinorrhea and trouble swallowing.    Eyes:  Negative for discharge and visual disturbance.   Respiratory:  Negative for chest tightness and wheezing.    Cardiovascular:  Negative for chest pain and palpitations.   Gastrointestinal:  Negative for blood in stool, constipation, diarrhea and vomiting.   Endocrine: Negative for polydipsia and polyuria.   Genitourinary:  Negative for difficulty urinating, hematuria and urgency.   Musculoskeletal:  Negative for arthralgias, joint swelling and neck pain.   Neurological:  Negative for  weakness and headaches.   Psychiatric/Behavioral:  Negative for confusion and dysphoric mood.     PHYSICAL EXAM  Vitals:    09/01/22 1458   BP: 136/84   BP Location: Right arm   Patient Position: Sitting   BP Method: Medium (Manual)   Pulse: 107   Temp: 98 °F (36.7 °C)   TempSrc: Tympanic   SpO2: 97%   Weight: 73.7 kg (162 lb 7.7 oz)   Height: 6' (1.829 m)   CONSTITUTIONAL: Vital signs noted. No apparent distress. Does not appear acutely ill or septic. Appears adequately hydrated.  EYE: Sclerae anicteric. Lids and conjunctiva unremarkable.  ENT: External ENT unremarkable. Hearing grossly intact. See PROCEDURE NOTE below  NECK: Trachea midline. Thyroid nontender.  PULM: Lungs clear. Breathing unlabored.  CV: Auscultation reveals regular rate and rhythm. Normal heart sounds. No carotid bruit.  GI: Soft and nontender. Bowel sounds normal.  DERM: Skin warm and moist with normal turgor.  NEURO: There are no gross focal motor deficits or gross deficits of cranial nerves III-XII.  PSYCH: Alert and oriented x 3. Mood is grossly neutral. Affect appropriate. Judgment and insight grossly intact.         PROCEDURE NOTE    *PROCEDURE: Cerumen Disimpaction, RIGHT  *SURGEON: THIERRY Jo MD  *DIAGNOSIS: Cerumen impaction, RIGHT  *INFORMED CONSENT:  After discussing diagnosis, nature and risks/benefits of procedure, and treatment alternatives, informed consent was obtained verbally.    *PRE-PROCEDURE SAFETY CHECK:  (1) Patient was patient identified by at least 2 means.  (2) Planned procedure was confirmed.  (3) Correct site was verified.  (4) Anticipated needed supplies were verified as available.    *PROCEDURE DESCRIPTION: Cerumen disimpaction was performed using disposable curette and irrigation.    *POST-PROCEDURE EXAM: Canals unobstructed. Visualized tympanic membranes intact.    *COMPLICATIONS: none    Ear hygiene education provided.     PAST MEDICAL HISTORY  Arian has a past medical history of Abdominal migraine,  "Cyclical vomiting with nausea, Eczema, H/O multiple concussions, and Migraines.    SURGICAL HISTORY  Arian has a past surgical history that includes Adenoidectomy; Tonsillectomy; Cholecystectomy (01/2018); and Esophagogastroduodenoscopy (N/A, 5/4/2021).    FAMILY HISTORY  Arian family history includes Diabetes in his maternal grandfather, maternal grandmother, paternal grandfather, and paternal grandmother; Heart disease in his mother and paternal grandfather; Hyperlipidemia in his paternal grandfather; Hypertension in his maternal grandfather, maternal grandmother, paternal grandfather, and paternal grandmother; Migraines in his maternal grandmother, mother, and sister; No Known Problems in his father; Other in his mother.     ALLERGIES  Review of patient's allergies indicates:  No Known Allergies    SOCIAL HISTORY  Arian  reports that he has never smoked. He has been exposed to tobacco smoke. He has never used smokeless tobacco. He reports current alcohol use of about 12.0 standard drinks per week. He reports that he does not use drugs.     Documentation entered by me for this encounter may have been done in part using speech-recognition technology. Although I have made an effort to ensure accuracy, "sound like" errors may exist and should be interpreted in context.   "

## 2022-09-01 NOTE — PATIENT INSTRUCTIONS
"EAR CARE AFTER CERUMEN REMOVAL  Mix white vinegar with equal amount of water to make half-strength white vinegar.  Fill ear canals with half-strength white vinegar 3 times a day for the next 3 days to prevent a "swimmer's ear" type infection.        I strongly recommend that you get your COVID-19 vaccine booster. I've already had mine.    The booster shot is necessary to be considered "fully vaccinated" against COVID-19. People who are fully vaccinated are much better protected from severe illness from COVID-19 than people who are unvaccinated or not fully vaccinated.    You can learn more about the COVID-19 vaccine and booster shot options at [email or web address]/[email or web address]-faqs.    The quickest and easiest way to schedule an appointment for your COVID-19 booster vaccination is by using [facility] or by calling [phone].    Please take care of yourself. You are important to me.   "

## 2022-09-02 LAB
ALBUMIN SERPL BCP-MCNC: 4.7 G/DL (ref 3.5–5.2)
ANION GAP SERPL CALC-SCNC: 10 MMOL/L (ref 8–16)
BUN SERPL-MCNC: 17 MG/DL (ref 6–20)
CALCIUM SERPL-MCNC: 9.6 MG/DL (ref 8.7–10.5)
CHLORIDE SERPL-SCNC: 104 MMOL/L (ref 95–110)
CHOLEST SERPL-MCNC: 152 MG/DL (ref 120–199)
CHOLEST/HDLC SERPL: 2.6 {RATIO} (ref 2–5)
CO2 SERPL-SCNC: 24 MMOL/L (ref 23–29)
CREAT SERPL-MCNC: 1 MG/DL (ref 0.5–1.4)
EST. GFR  (NO RACE VARIABLE): >60 ML/MIN/1.73 M^2
ESTIMATED AVG GLUCOSE: 85 MG/DL (ref 68–131)
GLUCOSE SERPL-MCNC: 72 MG/DL (ref 70–110)
HBA1C MFR BLD: 4.6 % (ref 4–5.6)
HDLC SERPL-MCNC: 58 MG/DL (ref 40–75)
HDLC SERPL: 38.2 % (ref 20–50)
LDLC SERPL CALC-MCNC: 77.8 MG/DL (ref 63–159)
NONHDLC SERPL-MCNC: 94 MG/DL
PHOSPHATE SERPL-MCNC: 3.5 MG/DL (ref 2.7–4.5)
POTASSIUM SERPL-SCNC: 3.9 MMOL/L (ref 3.5–5.1)
SODIUM SERPL-SCNC: 138 MMOL/L (ref 136–145)
TRIGL SERPL-MCNC: 81 MG/DL (ref 30–150)

## 2022-09-03 LAB
C TRACH DNA SPEC QL NAA+PROBE: NOT DETECTED
HIV 1+2 AB+HIV1 P24 AG SERPL QL IA: NORMAL
N GONORRHOEA DNA SPEC QL NAA+PROBE: NOT DETECTED
RPR SER QL: NORMAL

## 2022-09-06 DIAGNOSIS — I48.0 PAF (PAROXYSMAL ATRIAL FIBRILLATION): ICD-10-CM

## 2022-09-06 DIAGNOSIS — R00.0 SINUS TACHYCARDIA: Primary | ICD-10-CM

## 2022-09-06 NOTE — PROGRESS NOTES
Subjective:       Patient ID: Arian Farris is a 22 y.o. male.    Chief Complaint: No chief complaint on file.    HPI  Review of Systems   Constitutional:  Negative for chills and fever.   HENT:  Negative for congestion, ear pain, hearing loss and sinus pain.    Eyes:  Negative for blurred vision, discharge and redness.   Respiratory:  Negative for cough, hemoptysis, sputum production, shortness of breath and wheezing.    Cardiovascular:  Negative for chest pain, palpitations, claudication and leg swelling.   Gastrointestinal:  Negative for abdominal pain, constipation, diarrhea, heartburn, nausea and vomiting.   Genitourinary:  Negative for dysuria, frequency and urgency.   Musculoskeletal:  Negative for back pain, falls, joint pain, myalgias and neck pain.   Skin:  Negative for itching and rash.   Neurological:  Negative for dizziness, tingling, sensory change, focal weakness, weakness and headaches.   Psychiatric/Behavioral:  Negative for depression.      Review of Systems   Constitutional:  Negative for chills and fever.   HENT:  Negative for congestion, ear pain, hearing loss and sinus pain.    Eyes:  Negative for blurred vision, discharge and redness.   Respiratory:  Negative for cough, hemoptysis, sputum production, shortness of breath and wheezing.    Cardiovascular:  Negative for chest pain, palpitations, claudication and leg swelling.   Gastrointestinal:  Negative for abdominal pain, constipation, diarrhea, heartburn, nausea and vomiting.   Genitourinary:  Negative for dysuria, frequency and urgency.   Musculoskeletal:  Negative for back pain, falls, joint pain, myalgias and neck pain.   Skin:  Negative for itching and rash.   Neurological:  Negative for dizziness, tingling, sensory change, focal weakness, weakness and headaches.   Psychiatric/Behavioral:  Negative for depression.          Objective:      Physical Exam    Assessment:       No diagnosis found.    Plan:       ***

## 2022-09-10 NOTE — PROGRESS NOTES
"NORMAL  ----------  If you want to learn more about your test results and what they mean, it's as simple as 1, 2, 3.  1. Log in to [facility] using the [facility]aja or online at [email or web address].   2. From the "Test Results" tab, click on the test you want to know more about.  3. If using the mobile sailaja, click the "Get Info" icon. (The "Get Info" icon looks like a [place] with a lower case letter i inside it.) If using your computer, click the "Get Info" icon or the "About This Test" link."

## 2022-09-14 ENCOUNTER — PATIENT MESSAGE (OUTPATIENT)
Dept: ADMINISTRATIVE | Facility: OTHER | Age: 22
End: 2022-09-14
Payer: COMMERCIAL

## 2022-10-05 ENCOUNTER — OFFICE VISIT (OUTPATIENT)
Dept: OTOLARYNGOLOGY | Facility: CLINIC | Age: 22
End: 2022-10-05
Payer: COMMERCIAL

## 2022-10-05 ENCOUNTER — CLINICAL SUPPORT (OUTPATIENT)
Dept: AUDIOLOGY | Facility: CLINIC | Age: 22
End: 2022-10-05
Payer: COMMERCIAL

## 2022-10-05 VITALS
HEART RATE: 81 BPM | BODY MASS INDEX: 21.44 KG/M2 | TEMPERATURE: 98 F | WEIGHT: 158.06 LBS | DIASTOLIC BLOOD PRESSURE: 86 MMHG | SYSTOLIC BLOOD PRESSURE: 135 MMHG

## 2022-10-05 DIAGNOSIS — M26.629 TMJPDS (TEMPOROMANDIBULAR JOINT PAIN DYSFUNCTION SYNDROME): ICD-10-CM

## 2022-10-05 DIAGNOSIS — H93.13 TINNITUS AURIUM, BILATERAL: ICD-10-CM

## 2022-10-05 DIAGNOSIS — H92.02 OTALGIA OF LEFT EAR: ICD-10-CM

## 2022-10-05 DIAGNOSIS — H92.02 OTALGIA OF LEFT EAR: Primary | ICD-10-CM

## 2022-10-05 DIAGNOSIS — H61.21 RIGHT EAR IMPACTED CERUMEN: Primary | ICD-10-CM

## 2022-10-05 DIAGNOSIS — H93.13 SUBJECTIVE TINNITUS OF BOTH EARS: ICD-10-CM

## 2022-10-05 PROCEDURE — 99203 PR OFFICE/OUTPT VISIT, NEW, LEVL III, 30-44 MIN: ICD-10-PCS | Mod: 25,S$GLB,, | Performed by: OTOLARYNGOLOGY

## 2022-10-05 PROCEDURE — 3079F DIAST BP 80-89 MM HG: CPT | Mod: CPTII,S$GLB,, | Performed by: OTOLARYNGOLOGY

## 2022-10-05 PROCEDURE — 99999 PR PBB SHADOW E&M-EST. PATIENT-LVL I: ICD-10-PCS | Mod: PBBFAC,,, | Performed by: AUDIOLOGIST

## 2022-10-05 PROCEDURE — 69210 REMOVE IMPACTED EAR WAX UNI: CPT | Mod: S$GLB,,, | Performed by: OTOLARYNGOLOGY

## 2022-10-05 PROCEDURE — 69210 PR REMOVAL IMPACTED CERUMEN REQUIRING INSTRUMENTATION, UNILATERAL: ICD-10-PCS | Mod: S$GLB,,, | Performed by: OTOLARYNGOLOGY

## 2022-10-05 PROCEDURE — 3008F PR BODY MASS INDEX (BMI) DOCUMENTED: ICD-10-PCS | Mod: CPTII,S$GLB,, | Performed by: OTOLARYNGOLOGY

## 2022-10-05 PROCEDURE — 1159F MED LIST DOCD IN RCRD: CPT | Mod: CPTII,S$GLB,, | Performed by: OTOLARYNGOLOGY

## 2022-10-05 PROCEDURE — 3075F SYST BP GE 130 - 139MM HG: CPT | Mod: CPTII,S$GLB,, | Performed by: OTOLARYNGOLOGY

## 2022-10-05 PROCEDURE — 3044F PR MOST RECENT HEMOGLOBIN A1C LEVEL <7.0%: ICD-10-PCS | Mod: CPTII,S$GLB,, | Performed by: OTOLARYNGOLOGY

## 2022-10-05 PROCEDURE — 3008F BODY MASS INDEX DOCD: CPT | Mod: CPTII,S$GLB,, | Performed by: OTOLARYNGOLOGY

## 2022-10-05 PROCEDURE — 92567 PR TYMPA2METRY: ICD-10-PCS | Mod: S$GLB,,, | Performed by: AUDIOLOGIST

## 2022-10-05 PROCEDURE — 3079F PR MOST RECENT DIASTOLIC BLOOD PRESSURE 80-89 MM HG: ICD-10-PCS | Mod: CPTII,S$GLB,, | Performed by: OTOLARYNGOLOGY

## 2022-10-05 PROCEDURE — 3075F PR MOST RECENT SYSTOLIC BLOOD PRESS GE 130-139MM HG: ICD-10-PCS | Mod: CPTII,S$GLB,, | Performed by: OTOLARYNGOLOGY

## 2022-10-05 PROCEDURE — 99999 PR PBB SHADOW E&M-EST. PATIENT-LVL III: CPT | Mod: PBBFAC,,, | Performed by: OTOLARYNGOLOGY

## 2022-10-05 PROCEDURE — 99999 PR PBB SHADOW E&M-EST. PATIENT-LVL I: CPT | Mod: PBBFAC,,, | Performed by: AUDIOLOGIST

## 2022-10-05 PROCEDURE — 92557 PR COMPREHENSIVE HEARING TEST: ICD-10-PCS | Mod: S$GLB,,, | Performed by: AUDIOLOGIST

## 2022-10-05 PROCEDURE — 92567 TYMPANOMETRY: CPT | Mod: S$GLB,,, | Performed by: AUDIOLOGIST

## 2022-10-05 PROCEDURE — 99999 PR PBB SHADOW E&M-EST. PATIENT-LVL III: ICD-10-PCS | Mod: PBBFAC,,, | Performed by: OTOLARYNGOLOGY

## 2022-10-05 PROCEDURE — 99203 OFFICE O/P NEW LOW 30 MIN: CPT | Mod: 25,S$GLB,, | Performed by: OTOLARYNGOLOGY

## 2022-10-05 PROCEDURE — 3044F HG A1C LEVEL LT 7.0%: CPT | Mod: CPTII,S$GLB,, | Performed by: OTOLARYNGOLOGY

## 2022-10-05 PROCEDURE — 1159F PR MEDICATION LIST DOCUMENTED IN MEDICAL RECORD: ICD-10-PCS | Mod: CPTII,S$GLB,, | Performed by: OTOLARYNGOLOGY

## 2022-10-05 PROCEDURE — 92557 COMPREHENSIVE HEARING TEST: CPT | Mod: S$GLB,,, | Performed by: AUDIOLOGIST
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
## 2022-10-05 NOTE — PROGRESS NOTES
Referring Provider:    Aaareferral Self  No address on file  Subjective:   Patient: Arian Farris [id number], :2000   Visit date:10/5/2022 10:49 AM    Chief Complaint:  Other (Left ear pain)    HPI:    Prior notes reviewed by myself.  Clinical documentation obtained by nursing staff reviewed.     22-year-old gentleman presents for evaluation of left-sided otalgia and intermittent nonpulsatile tinnitus.  This has become an issue over the past week.  He denies any new medications, recent loud noise exposure.  He does not have any significant otologic history.  He is not noticed any acute changes in his hearing or any vertigo.  Did have some brownish otorrhea from his left ear over the weekend.      Objective:     Physical Exam:  Vitals:  /86   Pulse 81   Temp 98.1 °F (36.7 °C) (Temporal)   Wt 71.7 kg (158 lb 1.1 oz)   BMI 21.44 kg/m²   General appearance:  Well developed, well nourished    Ears:  Otoscopy of external auditory canals and tympanic membranes was significant for 100% cerumen impaction Ad, slight retraction As, clinical speech reception thresholds grossly intact, no mass/lesion of auricle.    Nose:  No masses/lesions of external nose, nasal mucosa, septum, and turbinates were within normal limits.    Mouth:  No mass/lesion of lips, teeth, gums, hard/soft palate, tongue, tonsils, or oropharynx.    Neck & Lymphatics:  No cervical lymphadenopathy, no neck mass/crepitus/ asymmetry, trachea is midline, no thyroid enlargement/tenderness/mass. Mildly TTP over TMJ        [x]  Data Reviewed:    Lab Results   Component Value Date    WBC 7.80 10/20/2021    HGB 15.9 10/20/2021    HCT 43.6 10/20/2021    MCV 80 (L) 10/20/2021    EOSINOPHIL 1.4 10/20/2021       Procedure Note    CHIEF COMPLAINT:  Cerumen Impaction    Description:  The patient was seated in an exam chair.  An ear speculum was placed in the right EAC and was examined under the microscope.  Suction and/or loop curettes were used to  remove a large cerumen impaction.  The tympanic membrane was visualized and was normal in appearance.  The patient tolerated the procedure well.       Media Information  File Link    Annotation on 10/5/2022 11:34 AM by STACI Veloz: AUDIOGRAM        Key Information    Document ID File Type Document Type Description   [id number] Annotation Annotation AUDIOGRAM     Import Information    Attached At Date Time User Dept   Encounter Level 10/5/2022 11:34 AM STACI Veloz [facility] Audiology     Encounter    Clinical Support on 10/5/22 with STACI Veloz     Media Audit Information    Patient Teaching and Education was moved from this patient by Kathi Son RN on 4/12/2021 at 12:33 PM (DCS ID: [id number], File: [id number])   Patient Teaching and Education was moved from this patient by Kathi Son RN on 4/12/2021 at 12:33 PM (DCS ID: [id number], File: [id number])       Assessment & Plan:   Right ear impacted cerumen    Tinnitus aurium, bilateral    Otalgia of left ear    TMJPDS (temporomandibular joint pain dysfunction syndrome)      He did have a cerumen impaction on the right side that we were able to remove today.  Otherwise his ear exam was only significant for some mild retraction on the left side the corrects with autoinsufflation.  He did have some mild tenderness over his TMJ left greater than right.  This may be the cause of his otalgia.  Audio was normal.    We had a long discussion regarding the underlying pathology of temporomandibular joint dysfunction (TMD) as the cause of ear pain.  We further discussed conservative measures to treat TMD including avoiding gum and other foods that require lots of chewing, warm compresses, and scheduled antinflammatories. We also discussed bruxism (grinding of the teeth) and the role that often plays in TMJ related pain.  I explained that for patients with evidence of wear consistent with bruxism, a mouth guard to be  worn while sleeping is often necessary.  If the pain persists with conservative treatment, the patient will then schedule an appointment with a dentist for further evaluation.

## 2022-11-25 ENCOUNTER — TELEPHONE (OUTPATIENT)
Dept: INTERNAL MEDICINE | Facility: CLINIC | Age: 22
End: 2022-11-25

## 2022-11-25 ENCOUNTER — OFFICE VISIT (OUTPATIENT)
Dept: INTERNAL MEDICINE | Facility: CLINIC | Age: 22
End: 2022-11-25
Payer: COMMERCIAL

## 2022-11-25 VITALS
BODY MASS INDEX: 21.8 KG/M2 | DIASTOLIC BLOOD PRESSURE: 84 MMHG | SYSTOLIC BLOOD PRESSURE: 138 MMHG | WEIGHT: 160.94 LBS | OXYGEN SATURATION: 99 % | HEIGHT: 72 IN | RESPIRATION RATE: 18 BRPM | HEART RATE: 95 BPM

## 2022-11-25 DIAGNOSIS — G43.009 MIGRAINE WITHOUT AURA AND WITHOUT STATUS MIGRAINOSUS, NOT INTRACTABLE: ICD-10-CM

## 2022-11-25 DIAGNOSIS — I10 ESSENTIAL HYPERTENSION: Chronic | ICD-10-CM

## 2022-11-25 DIAGNOSIS — R00.0 SINUS TACHYCARDIA: ICD-10-CM

## 2022-11-25 DIAGNOSIS — J40 BRONCHITIS: Primary | ICD-10-CM

## 2022-11-25 DIAGNOSIS — R09.89 CHEST CONGESTION: ICD-10-CM

## 2022-11-25 DIAGNOSIS — G43.D0 ABDOMINAL MIGRAINE, NOT INTRACTABLE: ICD-10-CM

## 2022-11-25 DIAGNOSIS — R06.2 WHEEZING: ICD-10-CM

## 2022-11-25 DIAGNOSIS — R05.1 ACUTE COUGH: ICD-10-CM

## 2022-11-25 DIAGNOSIS — I48.0 PAF (PAROXYSMAL ATRIAL FIBRILLATION): ICD-10-CM

## 2022-11-25 DIAGNOSIS — R11.15 CYCLIC VOMITING SYNDROME: ICD-10-CM

## 2022-11-25 DIAGNOSIS — R05.9 COUGH, UNSPECIFIED TYPE: ICD-10-CM

## 2022-11-25 LAB
CTP QC/QA: YES
CTP QC/QA: YES
POC MOLECULAR INFLUENZA A AGN: NEGATIVE
POC MOLECULAR INFLUENZA B AGN: NEGATIVE
SARS-COV-2 RDRP RESP QL NAA+PROBE: NEGATIVE

## 2022-11-25 PROCEDURE — 3079F DIAST BP 80-89 MM HG: CPT | Mod: CPTII,S$GLB,, | Performed by: NURSE PRACTITIONER

## 2022-11-25 PROCEDURE — 99214 PR OFFICE/OUTPT VISIT, EST, LEVL IV, 30-39 MIN: ICD-10-PCS | Mod: 25,S$GLB,, | Performed by: NURSE PRACTITIONER

## 2022-11-25 PROCEDURE — 99999 PR PBB SHADOW E&M-EST. PATIENT-LVL III: CPT | Mod: PBBFAC,,, | Performed by: NURSE PRACTITIONER

## 2022-11-25 PROCEDURE — 87502 INFLUENZA DNA AMP PROBE: CPT | Mod: QW,S$GLB,, | Performed by: NURSE PRACTITIONER

## 2022-11-25 PROCEDURE — 3008F PR BODY MASS INDEX (BMI) DOCUMENTED: ICD-10-PCS | Mod: CPTII,S$GLB,, | Performed by: NURSE PRACTITIONER

## 2022-11-25 PROCEDURE — 87502 POCT INFLUENZA A/B MOLECULAR: ICD-10-PCS | Mod: QW,S$GLB,, | Performed by: NURSE PRACTITIONER

## 2022-11-25 PROCEDURE — 99214 OFFICE O/P EST MOD 30 MIN: CPT | Mod: 25,S$GLB,, | Performed by: NURSE PRACTITIONER

## 2022-11-25 PROCEDURE — 3075F PR MOST RECENT SYSTOLIC BLOOD PRESS GE 130-139MM HG: ICD-10-PCS | Mod: CPTII,S$GLB,, | Performed by: NURSE PRACTITIONER

## 2022-11-25 PROCEDURE — 87635: ICD-10-PCS | Mod: QW,S$GLB,, | Performed by: NURSE PRACTITIONER

## 2022-11-25 PROCEDURE — 96372 THER/PROPH/DIAG INJ SC/IM: CPT | Mod: S$GLB,,, | Performed by: NURSE PRACTITIONER

## 2022-11-25 PROCEDURE — 1159F MED LIST DOCD IN RCRD: CPT | Mod: CPTII,S$GLB,, | Performed by: NURSE PRACTITIONER

## 2022-11-25 PROCEDURE — 99999 PR PBB SHADOW E&M-EST. PATIENT-LVL III: ICD-10-PCS | Mod: PBBFAC,,, | Performed by: NURSE PRACTITIONER

## 2022-11-25 PROCEDURE — 3075F SYST BP GE 130 - 139MM HG: CPT | Mod: CPTII,S$GLB,, | Performed by: NURSE PRACTITIONER

## 2022-11-25 PROCEDURE — 1159F PR MEDICATION LIST DOCUMENTED IN MEDICAL RECORD: ICD-10-PCS | Mod: CPTII,S$GLB,, | Performed by: NURSE PRACTITIONER

## 2022-11-25 PROCEDURE — 87635 SARS-COV-2 COVID-19 AMP PRB: CPT | Mod: QW,S$GLB,, | Performed by: NURSE PRACTITIONER

## 2022-11-25 PROCEDURE — 3008F BODY MASS INDEX DOCD: CPT | Mod: CPTII,S$GLB,, | Performed by: NURSE PRACTITIONER

## 2022-11-25 PROCEDURE — 96372 PR INJECTION,THERAP/PROPH/DIAG2ST, IM OR SUBCUT: ICD-10-PCS | Mod: S$GLB,,, | Performed by: NURSE PRACTITIONER

## 2022-11-25 PROCEDURE — 3079F PR MOST RECENT DIASTOLIC BLOOD PRESSURE 80-89 MM HG: ICD-10-PCS | Mod: CPTII,S$GLB,, | Performed by: NURSE PRACTITIONER

## 2022-11-25 PROCEDURE — 3044F PR MOST RECENT HEMOGLOBIN A1C LEVEL <7.0%: ICD-10-PCS | Mod: CPTII,S$GLB,, | Performed by: NURSE PRACTITIONER

## 2022-11-25 PROCEDURE — 3044F HG A1C LEVEL LT 7.0%: CPT | Mod: CPTII,S$GLB,, | Performed by: NURSE PRACTITIONER

## 2022-11-25 RX ORDER — ALBUTEROL SULFATE 90 UG/1
2 AEROSOL, METERED RESPIRATORY (INHALATION) EVERY 6 HOURS PRN
Qty: 18 G | Refills: 0 | Status: SHIPPED | OUTPATIENT
Start: 2022-11-25 | End: 2022-12-19

## 2022-11-25 RX ORDER — AMOXICILLIN AND CLAVULANATE POTASSIUM 875; 125 MG/1; MG/1
1 TABLET, FILM COATED ORAL EVERY 12 HOURS
Qty: 14 TABLET | Refills: 0 | Status: SHIPPED | OUTPATIENT
Start: 2022-11-25 | End: 2023-05-15

## 2022-11-25 RX ORDER — BETAMETHASONE SODIUM PHOSPHATE AND BETAMETHASONE ACETATE 3; 3 MG/ML; MG/ML
6 INJECTION, SUSPENSION INTRA-ARTICULAR; INTRALESIONAL; INTRAMUSCULAR; SOFT TISSUE
Status: COMPLETED | OUTPATIENT
Start: 2022-11-25 | End: 2022-11-25

## 2022-11-25 RX ORDER — ALBUTEROL SULFATE 90 UG/1
2 AEROSOL, METERED RESPIRATORY (INHALATION) EVERY 6 HOURS PRN
Qty: 18 G | Refills: 0 | Status: SHIPPED | OUTPATIENT
Start: 2022-11-25 | End: 2022-11-25

## 2022-11-25 RX ADMIN — BETAMETHASONE SODIUM PHOSPHATE AND BETAMETHASONE ACETATE 6 MG: 3; 3 INJECTION, SUSPENSION INTRA-ARTICULAR; INTRALESIONAL; INTRAMUSCULAR; SOFT TISSUE at 10:11

## 2022-11-25 NOTE — TELEPHONE ENCOUNTER
Called Elvi w/patients pharmacy.Patients insurance will not cover the Albuterol Inhaler they will only cover ( Insurance) Proair. Gave a verbal ok to pharmacy, verified patients allergies. Will advise Lesley PATTERSON MA

## 2022-11-25 NOTE — PROGRESS NOTES
"INTERNAL MEDICINE URGENT CARE NOTE    CHIEF COMPLAINT     Chief Complaint   Patient presents with    Cough    Breast Problem     X 3 days       HPI     Arian Farris is a 22 y.o. male with cyclical vomiting, abdominal migraines, migraine headaches, HTN, PAF and sinus tachycardia who presents for an urgent visit today.    Here with c/o cough and congestion x 3 days. Pain to the chest with coughing   "Having trouble taking a deep breath"   +heart racing and skipping beats   Cough productive with green sputum     No h/o asthma     Taking mucinex as needed     Past Medical History:  Past Medical History:   Diagnosis Date    Abdominal migraine     Cyclical vomiting with nausea     Eczema     H/O multiple concussions     Migraines        Home Medications:  Prior to Admission medications    Not on File       Review of Systems:  Review of Systems   Constitutional:  Negative for chills and fever.   HENT:  Positive for sore throat. Negative for congestion, ear pain, postnasal drip, rhinorrhea, sinus pressure and sinus pain.    Respiratory:  Positive for cough, chest tightness and shortness of breath. Negative for wheezing.    Cardiovascular:  Positive for palpitations. Negative for chest pain.   Gastrointestinal:  Negative for abdominal pain, constipation, diarrhea, nausea and vomiting.   Neurological:  Negative for dizziness, light-headedness and headaches.     Health Maintainence:   Immunizations:  Health Maintenance         Date Due Completion Date    COVID-19 Vaccine (3 - Booster for Pfizer series) 08/12/2021 6/17/2021    TETANUS VACCINE 09/01/2032 9/1/2022             PHYSICAL EXAM     /84 (BP Location: Left arm, Patient Position: Sitting, BP Method: Large (Manual))   Pulse 95   Resp 18   Ht 6' (1.829 m)   Wt 73 kg (160 lb 15 oz)   SpO2 99%   BMI 21.83 kg/m²     Physical Exam  Vitals reviewed.   Constitutional:       Appearance: He is well-developed.   HENT:      Head: Normocephalic.      Right Ear: " External ear normal.      Left Ear: External ear normal.      Nose: Nose normal.      Mouth/Throat:      Pharynx: No oropharyngeal exudate.   Eyes:      Pupils: Pupils are equal, round, and reactive to light.   Neck:      Thyroid: No thyromegaly.      Vascular: No JVD.      Trachea: No tracheal deviation.   Cardiovascular:      Rate and Rhythm: Normal rate and regular rhythm.      Heart sounds: Normal heart sounds. No murmur heard.    No friction rub. No gallop.   Pulmonary:      Effort: No respiratory distress.      Breath sounds: Wheezing present. No rales.   Chest:      Chest wall: No tenderness.   Abdominal:      General: Bowel sounds are normal. There is no distension.      Palpations: Abdomen is soft.      Tenderness: There is no abdominal tenderness.   Musculoskeletal:         General: No tenderness. Normal range of motion.   Lymphadenopathy:      Cervical: No cervical adenopathy.   Skin:     General: Skin is warm and dry.      Findings: No rash.   Neurological:      Mental Status: He is alert and oriented to person, place, and time.   Psychiatric:         Behavior: Behavior normal.       LABS     Lab Results   Component Value Date    HGBA1C 4.6 09/01/2022     CMP  Sodium   Date Value Ref Range Status   09/01/2022 138 136 - 145 mmol/L Final     Potassium   Date Value Ref Range Status   09/01/2022 3.9 3.5 - 5.1 mmol/L Final     Chloride   Date Value Ref Range Status   09/01/2022 104 95 - 110 mmol/L Final     CO2   Date Value Ref Range Status   09/01/2022 24 23 - 29 mmol/L Final     Glucose   Date Value Ref Range Status   09/01/2022 72 70 - 110 mg/dL Final     BUN   Date Value Ref Range Status   09/01/2022 17 6 - 20 mg/dL Final     Creatinine   Date Value Ref Range Status   09/01/2022 1.0 0.5 - 1.4 mg/dL Final     Calcium   Date Value Ref Range Status   09/01/2022 9.6 8.7 - 10.5 mg/dL Final     Total Protein   Date Value Ref Range Status   10/20/2021 7.3 6.0 - 8.4 g/dL Final     Albumin   Date Value Ref Range  Status   09/01/2022 4.7 3.5 - 5.2 g/dL Final     Total Bilirubin   Date Value Ref Range Status   10/20/2021 0.6 0.1 - 1.0 mg/dL Final     Comment:     For infants and newborns, interpretation of results should be based  on gestational age, weight and in agreement with clinical  observations.    Premature Infant recommended reference ranges:  Up to 24 hours.............<8.0 mg/dL  Up to 48 hours............<12.0 mg/dL  3-5 days..................<15.0 mg/dL  6-29 days.................<15.0 mg/dL       Alkaline Phosphatase   Date Value Ref Range Status   10/20/2021 102 55 - 135 U/L Final     AST   Date Value Ref Range Status   10/20/2021 22 10 - 40 U/L Final     ALT   Date Value Ref Range Status   10/20/2021 24 10 - 44 U/L Final     Anion Gap   Date Value Ref Range Status   09/01/2022 10 8 - 16 mmol/L Final     eGFR if    Date Value Ref Range Status   10/20/2021 >60 >60 mL/min/1.73 m^2 Final     eGFR if non    Date Value Ref Range Status   10/20/2021 >60 >60 mL/min/1.73 m^2 Final     Comment:     Calculation used to obtain the estimated glomerular filtration  rate (eGFR) is the CKD-EPI equation.        Lab Results   Component Value Date    WBC 7.80 10/20/2021    HGB 15.9 10/20/2021    HCT 43.6 10/20/2021    MCV 80 (L) 10/20/2021     10/20/2021     Lab Results   Component Value Date    CHOL 152 09/01/2022    CHOL 155 03/12/2021    CHOL 163 11/25/2019     Lab Results   Component Value Date    HDL 58 09/01/2022    HDL 58 03/12/2021    HDL 61 11/25/2019     Lab Results   Component Value Date    LDLCALC 77.8 09/01/2022    LDLCALC 82.0 03/12/2021    LDLCALC 79.4 11/25/2019     Lab Results   Component Value Date    TRIG 81 09/01/2022    TRIG 75 03/12/2021    TRIG 113 11/25/2019     Lab Results   Component Value Date    CHOLHDL 38.2 09/01/2022    CHOLHDL 37.4 03/12/2021    CHOLHDL 37.4 11/25/2019     Lab Results   Component Value Date    TSH 1.308 05/16/2021    E2NIOUH 7.0 02/08/2018        ASSESSMENT/PLAN     Arian Farris is a 22 y.o. male       Bronchitis- POCT covid and flu negative. Will start augmentin. [facility]estone IM. Mucinex and albuterol as needed.   -     betamethasone acetate-betamethasone sodium phosphate injection 6 mg  -     albuterol (VENTOLIN HFA) 90 mcg/actuation inhaler; Inhale 2 puffs into the lungs every 6 (six) hours as needed for Wheezing. Rescue  Dispense: 18 g; Refill: 0  -     amoxicillin-clavulanate 875-125mg (AUGMENTIN) 875-125 mg per tablet; Take 1 tablet by mouth every 12 (twelve) hours.  Dispense: 14 tablet; Refill: 0    Cough, unspecified type  -     betamethasone acetate-betamethasone sodium phosphate injection 6 mg  -     albuterol (VENTOLIN HFA) 90 mcg/actuation inhaler; Inhale 2 puffs into the lungs every 6 (six) hours as needed for Wheezing. Rescue  Dispense: 18 g; Refill: 0  -     amoxicillin-clavulanate 875-125mg (AUGMENTIN) 875-125 mg per tablet; Take 1 tablet by mouth every 12 (twelve) hours.  Dispense: 14 tablet; Refill: 0    Chest congestion  -     POCT Influenza A/B Molecular  -     POCT COVID-19 Rapid Screening    Wheezing  -     betamethasone acetate-betamethasone sodium phosphate injection 6 mg  -     albuterol (VENTOLIN HFA) 90 mcg/actuation inhaler; Inhale 2 puffs into the lungs every 6 (six) hours as needed for Wheezing. Rescue  Dispense: 18 g; Refill: 0  -     amoxicillin-clavulanate 875-125mg (AUGMENTIN) 875-125 mg per tablet; Take 1 tablet by mouth every 12 (twelve) hours.  Dispense: 14 tablet; Refill: 0    Essential hypertension- stable. Will cont to monitor. Low Na     PAF (paroxysmal atrial fibrillation)- stable. Regular rate and rhythm. Will monitor.     Sinus tachycardia    Abdominal migraine, not intractable    Migraine without aura and without status migrainosus, not intractable    Cyclic vomiting syndrome    Acute cough  -     POCT Influenza A/B Molecular  -     POCT COVID-19 Rapid Screening       Follow up with PCP    Patient  education provided from [facility]. Patient was counseled on when and how to seek emergent care.       Lesley GUERRERO, HA, FNP-c   Department of Internal Medicine - [facility]  9:08 AM

## 2022-11-25 NOTE — NURSING
Pt arrived at clinic visit, after obtaining consent, and per orders of HILARIO Andres NP, and using two pt identifiers, an injection of 1mL of Betamethasone 6mg/mL was given in his left deltoid, by Mady Corbin LPN. Patient instructed to remain in lobby for 15 minutes afterwards, and to report any adverse reaction to me immediately.

## 2022-11-25 NOTE — TELEPHONE ENCOUNTER
----- Message from Kelly Gomez sent at 11/25/2022 11:14 AM CST -----  Contact: [facility] [phone]  Pharmacy is calling to clarify an RX.  RX name:  albuterol (VENTOLIN HFA) 90 mcg/actuation inhaler  What do they need to clarify:  Insurance will only cover Proair. Is it okay to change? Please contact pharmacy. Thanks  Comments:

## 2022-12-12 ENCOUNTER — HOSPITAL ENCOUNTER (OUTPATIENT)
Dept: RADIOLOGY | Facility: HOSPITAL | Age: 22
Discharge: HOME | End: 2022-12-12
Attending: ORTHOPAEDIC SURGERY
Payer: COMMERCIAL

## 2022-12-12 ENCOUNTER — OFFICE VISIT (OUTPATIENT)
Dept: SPORTS MEDICINE | Facility: CLINIC | Age: 22
End: 2022-12-12
Payer: COMMERCIAL

## 2022-12-12 VITALS
SYSTOLIC BLOOD PRESSURE: 136 MMHG | WEIGHT: 157.69 LBS | HEART RATE: 82 BPM | DIASTOLIC BLOOD PRESSURE: 84 MMHG | HEIGHT: 72 IN | BODY MASS INDEX: 21.36 KG/M2

## 2022-12-12 DIAGNOSIS — M25.562 LEFT KNEE PAIN, UNSPECIFIED CHRONICITY: Primary | ICD-10-CM

## 2022-12-12 DIAGNOSIS — M23.90 INTERNAL DERANGEMENT OF KNEE, UNSPECIFIED LATERALITY: ICD-10-CM

## 2022-12-12 DIAGNOSIS — M25.562 LEFT KNEE PAIN, UNSPECIFIED CHRONICITY: ICD-10-CM

## 2022-12-12 DIAGNOSIS — S83.522A SPRAIN OF POSTERIOR CRUCIATE LIGAMENT OF LEFT KNEE, INITIAL ENCOUNTER: ICD-10-CM

## 2022-12-12 PROCEDURE — 99214 PR OFFICE/OUTPT VISIT, EST, LEVL IV, 30-39 MIN: ICD-10-PCS | Mod: S$GLB,,, | Performed by: ORTHOPAEDIC SURGERY

## 2022-12-12 PROCEDURE — 99999 PR PBB SHADOW E&M-EST. PATIENT-LVL III: CPT | Mod: PBBFAC,,, | Performed by: ORTHOPAEDIC SURGERY

## 2022-12-12 PROCEDURE — 3044F HG A1C LEVEL LT 7.0%: CPT | Mod: CPTII,S$GLB,, | Performed by: ORTHOPAEDIC SURGERY

## 2022-12-12 PROCEDURE — 3008F BODY MASS INDEX DOCD: CPT | Mod: CPTII,S$GLB,, | Performed by: ORTHOPAEDIC SURGERY

## 2022-12-12 PROCEDURE — 73564 X-RAY EXAM KNEE 4 OR MORE: CPT | Mod: 26,,, | Performed by: RADIOLOGY

## 2022-12-12 PROCEDURE — 3079F DIAST BP 80-89 MM HG: CPT | Mod: CPTII,S$GLB,, | Performed by: ORTHOPAEDIC SURGERY

## 2022-12-12 PROCEDURE — 99999 PR PBB SHADOW E&M-EST. PATIENT-LVL III: ICD-10-PCS | Mod: PBBFAC,,, | Performed by: ORTHOPAEDIC SURGERY

## 2022-12-12 PROCEDURE — 99214 OFFICE O/P EST MOD 30 MIN: CPT | Mod: S$GLB,,, | Performed by: ORTHOPAEDIC SURGERY

## 2022-12-12 PROCEDURE — 73564 XR KNEE ORTHO BILAT WITH FLEXION: ICD-10-PCS | Mod: 26,,, | Performed by: RADIOLOGY

## 2022-12-12 PROCEDURE — 3008F PR BODY MASS INDEX (BMI) DOCUMENTED: ICD-10-PCS | Mod: CPTII,S$GLB,, | Performed by: ORTHOPAEDIC SURGERY

## 2022-12-12 PROCEDURE — 3075F SYST BP GE 130 - 139MM HG: CPT | Mod: CPTII,S$GLB,, | Performed by: ORTHOPAEDIC SURGERY

## 2022-12-12 PROCEDURE — 1160F PR REVIEW ALL MEDS BY PRESCRIBER/CLIN PHARMACIST DOCUMENTED: ICD-10-PCS | Mod: CPTII,S$GLB,, | Performed by: ORTHOPAEDIC SURGERY

## 2022-12-12 PROCEDURE — 3079F PR MOST RECENT DIASTOLIC BLOOD PRESSURE 80-89 MM HG: ICD-10-PCS | Mod: CPTII,S$GLB,, | Performed by: ORTHOPAEDIC SURGERY

## 2022-12-12 PROCEDURE — 3075F PR MOST RECENT SYSTOLIC BLOOD PRESS GE 130-139MM HG: ICD-10-PCS | Mod: CPTII,S$GLB,, | Performed by: ORTHOPAEDIC SURGERY

## 2022-12-12 PROCEDURE — 1159F PR MEDICATION LIST DOCUMENTED IN MEDICAL RECORD: ICD-10-PCS | Mod: CPTII,S$GLB,, | Performed by: ORTHOPAEDIC SURGERY

## 2022-12-12 PROCEDURE — 1159F MED LIST DOCD IN RCRD: CPT | Mod: CPTII,S$GLB,, | Performed by: ORTHOPAEDIC SURGERY

## 2022-12-12 PROCEDURE — 3044F PR MOST RECENT HEMOGLOBIN A1C LEVEL <7.0%: ICD-10-PCS | Mod: CPTII,S$GLB,, | Performed by: ORTHOPAEDIC SURGERY

## 2022-12-12 PROCEDURE — 73564 X-RAY EXAM KNEE 4 OR MORE: CPT | Mod: TC,50

## 2022-12-12 PROCEDURE — 1160F RVW MEDS BY RX/DR IN RCRD: CPT | Mod: CPTII,S$GLB,, | Performed by: ORTHOPAEDIC SURGERY

## 2022-12-12 RX ORDER — MELOXICAM 7.5 MG/1
7.5 TABLET ORAL DAILY
Qty: 30 TABLET | Refills: 0 | Status: SHIPPED | OUTPATIENT
Start: 2022-12-12 | End: 2023-05-15
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
## 2022-12-20 ENCOUNTER — HOSPITAL ENCOUNTER (OUTPATIENT)
Dept: RADIOLOGY | Facility: OTHER | Age: 22
Discharge: HOME | End: 2022-12-20
Attending: STUDENT IN AN ORGANIZED HEALTH CARE EDUCATION/TRAINING PROGRAM
Payer: COMMERCIAL

## 2022-12-20 ENCOUNTER — TELEPHONE (OUTPATIENT)
Dept: SPORTS MEDICINE | Facility: CLINIC | Age: 22
End: 2022-12-20
Payer: COMMERCIAL

## 2022-12-20 DIAGNOSIS — M25.562 LEFT KNEE PAIN, UNSPECIFIED CHRONICITY: ICD-10-CM

## 2022-12-20 DIAGNOSIS — M25.562 ACUTE PAIN OF LEFT KNEE: Primary | ICD-10-CM

## 2022-12-20 DIAGNOSIS — S83.522A SPRAIN OF POSTERIOR CRUCIATE LIGAMENT OF LEFT KNEE, INITIAL ENCOUNTER: ICD-10-CM

## 2022-12-20 DIAGNOSIS — M23.90 INTERNAL DERANGEMENT OF KNEE, UNSPECIFIED LATERALITY: ICD-10-CM

## 2022-12-20 PROCEDURE — 73721 MRI JNT OF LWR EXTRE W/O DYE: CPT | Mod: 26,LT,, | Performed by: INTERNAL MEDICINE

## 2022-12-20 PROCEDURE — 73721 MRI JNT OF LWR EXTRE W/O DYE: CPT | Mod: TC,LT

## 2022-12-20 PROCEDURE — 73721 MRI KNEE WITHOUT CONTRAST LEFT: ICD-10-PCS | Mod: 26,LT,, | Performed by: INTERNAL MEDICINE

## 2022-12-20 NOTE — TELEPHONE ENCOUNTER
Called patient to discuss MRI results of his left knee.  There was concern for PCL injury on exam, but this was reviewed and seen as normal.  At this time recommend course of physical therapy.  Patient is in [place] and will get the patient set up to do physical therapy with Neto Aguayo.  PT referral was placed.  Patient was educated that if his symptoms have not improved after 4 weeks of therapy to give our clinic a call back for repeat evaluation.  Patient was in agreement with this plan.

## 2023-02-27 NOTE — PROGRESS NOTES
WELLNESS VISIT (PREVENTIVE SERVICES)    CHIEF COMPLAINT  Annual Exam      HEALTH MAINTENANCE INTERVENTIONS - UP TO DATE  Health Maintenance Topics with due status: Not Due       Topic Last Completion Date    TETANUS VACCINE 02/12/2010       HEALTH MAINTENANCE INTERVENTIONS - DUE OR DUE SOON  Health Maintenance Due   Topic Date Due    HPV Vaccines (2 - Male 3-dose series) 12/23/2019       HISTORY, ASSESSMENT, and PLAN    1. Preventative health care  - Lipid panel; Future  - Basic metabolic panel; Future    2. Need for influenza vaccination  Ordered    3. Need for HPV vaccination  Ordered  - HPV Vaccine (9-Valent) (3 Dose) (IM); Future  - HPV Vaccine (9-Valent) (3 Dose) (IM); Future  - HPV Vaccine (9-Valent) (3 Dose) (IM); Future  - HPV Vaccine (9-Valent) (3 Dose) (IM)    4. Screening for lipid disorders  - Lipid panel; Future    5. Hyperbilirubinemia  Asymptomatic.  He is due for diagnostic tests to evaluate and monitor this problem.   - Gamma GT; Future  - Basic metabolic panel; Future  - Hepatic function panel; Future    6. Elevated alkaline phosphatase level  Asymptomatic.  He is due for diagnostic tests to evaluate and monitor this problem.   - Gamma GT; Future  - Hepatic function panel; Future    7. Elevated glucose  Asymptomatic.  He is due for diagnostic tests to evaluate and monitor this problem.   - Basic metabolic panel; Future      Medication List with Changes/Refills   Discontinued Medications    PROMETHAZINE (PHENERGAN) 12.5 MG TAB    Take 1 tablet (12.5 mg total) by mouth 3 (three) times daily as needed.       Start Date: 7/26/2019 End Date: 11/25/2019        Follow up in about 1 year (around 11/25/2020) for wellness and preventive services.    Problem List Items Addressed This Visit        Other    Elevated alkaline phosphatase level    Relevant Orders    Gamma GT    Hepatic function panel    Hyperbilirubinemia (Chronic)    Relevant Orders    Gamma GT    Basic metabolic panel    Hepatic function panel  [FreeTextEntry1] : Patient is a 69 year old female with PMHx THN, DM, CAD, colon polyps, presents for neurosurgical evaluation of brain tumor. MRI brain on 11/30/22 (at [facility]) showing small dural based partially calcified extraaxial mass overlying R cerebellar hemisphere 1.5cm x 1.4cm x 1.2cm most likely consistent with meningioma with focal invasion of R transverse sinus with severe stenosis. Patient with hx of vertigo and intermittent dizziness, no headache, vision changes, weakness or balance issues. Patient and spouse reassured that the tumor is benign and small. We will continue to monitor with serial MRIs. Repeat MRI in one year from last one. \par \par \par \par  "     Other Visit Diagnoses     Preventative health care    -  Primary    Relevant Orders    Lipid panel    Basic metabolic panel    Need for influenza vaccination        Need for HPV vaccination        Relevant Orders    HPV Vaccine (9-Valent) (3 Dose) (IM) (Completed)    HPV Vaccine (9-Valent) (3 Dose) (IM)    HPV Vaccine (9-Valent) (3 Dose) (IM)    Screening for lipid disorders        Relevant Orders    Lipid panel    Elevated glucose        Relevant Orders    Basic metabolic panel            REVIEW OF SYSTEMS  Answers for HPI/ROS submitted by the patient on 11/24/2019    activity change: No  unexpected weight change: No  neck pain: No  hearing loss: No  rhinorrhea: No  trouble swallowing: No  eye discharge: No  visual disturbance: No  chest tightness: No  wheezing: No  chest pain: No  palpitations: No  blood in stool: No  constipation: No  vomiting: No  diarrhea: No  polydipsia: No  polyuria: No  difficulty urinating: No  urgency: No  hematuria: No  joint swelling: No  arthralgias: No  headaches: No  weakness: No  confusion: No  dysphoric mood: No     PHYSICAL EXAM  Vitals:    11/25/19 1400   BP: 124/80   Pulse: 110   Temp: 97.7 °F (36.5 °C)   TempSrc: Tympanic   SpO2: 95%   Weight: 69.7 kg (153 lb 10.6 oz)   Height: 5' 11" (1.803 m)     CONSTITUTIONAL: Vital signs noted. No apparent distress. Does not appear acutely ill or septic. Appears adequately hydrated.  EYE: Sclerae anicteric. Lids and conjunctiva unremarkable.  ENT: External ENT unremarkable. Oropharynx moist.  NECK: Trachea midline. Thyroid nontender.  PULM: Lungs clear. Breathing unlabored.  CV: Auscultation reveals regular rate and rhythm without murmur, gallop or rub. No carotid bruit.  GI: Soft and nontender. Bowel sounds normal.  DERM: Skin warm and moist with normal turgor.  PSYCH: Alert and oriented x 3. Mood is grossly neutral. Affect appropriate. Judgment and insight not grossly compromised.      PAST MEDICAL HISTORY  He has a past medical " "history of Abdominal migraine, Cyclical vomiting with nausea, Eczema, H/O multiple concussions, and Migraines.    SURGICAL HISTORY  He has a past surgical history that includes Adenoidectomy; Tonsillectomy; and Cholecystectomy (01/2018).    FAMILY HISTORY  His family history includes Diabetes in his maternal grandfather, maternal grandmother, paternal grandfather, and paternal grandmother; Heart disease in his mother and paternal grandfather; Hyperlipidemia in his paternal grandfather; Hypertension in his maternal grandfather, maternal grandmother, paternal grandfather, and paternal grandmother; Migraines in his maternal grandmother, mother, and sister; No Known Problems in his father; Other in his mother.     ALLERGIES  Review of patient's allergies indicates:  No Known Allergies    SOCIAL HISTORY   TOBACCO USE HISTORY: He reports that he is a non-smoker but has been exposed to tobacco smoke. He has never used smokeless tobacco.   ALCOHOL USE HISTORY:  He reports that he drinks about 12.0 standard drinks of alcohol per week.   RECREATIONAL DRUG USE HISTORY:  He reports that he does not use drugs.    Documentation entered by me for this encounter may have been done in part using speech-recognition technology. Although I have made an effort to ensure accuracy, "sound like" errors may exist and should be interpreted in context. -THIERRY Jo MD.   "

## 2023-05-05 NOTE — PATIENT INSTRUCTIONS
Do not let pain increase with exercises.  Use ice for 10-20 minutes to decrease pain as needed.  Pendulum Circular        Bend forward 90° at waist, leaning on table for support. Let left arm hang for 2-3 minutes  Do __2__ sessions per day.    Posterior Capsule Sleeper Stretch, Side-Lying        Lie on side, pillow under head, neck in neutral, underside arm in 90º-90º of shoulder and elbow flexion with scapula fixed to table. Use other hand to press back of underside arm forward and downward. Keep elbow angle. Hold _20__ seconds.   Repeat __4_ times per session. Do _1-2__ sessions per day.            Lie with right arm hanging down. Lift arm out to side, thumb up.  Repeat ___3 x 10_ times per set. Do __1__ sets per session. Do _1-2__ sessions per week.  NO weight.      Copyright © [employer]. All rights reserved.      negative

## 2023-05-15 ENCOUNTER — E-CONSULT (OUTPATIENT)
Dept: DERMATOLOGY | Facility: CLINIC | Age: 23
End: 2023-05-15
Payer: COMMERCIAL

## 2023-05-15 ENCOUNTER — OFFICE VISIT (OUTPATIENT)
Dept: INTERNAL MEDICINE | Facility: CLINIC | Age: 23
End: 2023-05-15
Payer: COMMERCIAL

## 2023-05-15 VITALS
WEIGHT: 156.06 LBS | DIASTOLIC BLOOD PRESSURE: 78 MMHG | HEIGHT: 72 IN | OXYGEN SATURATION: 98 % | SYSTOLIC BLOOD PRESSURE: 138 MMHG | TEMPERATURE: 98 F | BODY MASS INDEX: 21.14 KG/M2 | HEART RATE: 73 BPM

## 2023-05-15 DIAGNOSIS — L30.9 DERMATITIS, UNSPECIFIED: Primary | ICD-10-CM

## 2023-05-15 DIAGNOSIS — L20.84 INTRINSIC ECZEMA: ICD-10-CM

## 2023-05-15 DIAGNOSIS — I48.91 LONE ATRIAL FIBRILLATION: Chronic | ICD-10-CM

## 2023-05-15 DIAGNOSIS — R11.15 CYCLIC VOMITING SYNDROME: ICD-10-CM

## 2023-05-15 DIAGNOSIS — G43.009 MIGRAINE WITHOUT AURA AND WITHOUT STATUS MIGRAINOSUS, NOT INTRACTABLE: Chronic | ICD-10-CM

## 2023-05-15 DIAGNOSIS — R21 RASH AND NONSPECIFIC SKIN ERUPTION: Primary | ICD-10-CM

## 2023-05-15 PROBLEM — G44.219 EPISODIC TENSION-TYPE HEADACHE, NOT INTRACTABLE: Chronic | Status: ACTIVE | Noted: 2020-11-20

## 2023-05-15 PROBLEM — K92.0 HEMATEMESIS: Status: RESOLVED | Noted: 2021-05-04 | Resolved: 2023-05-15

## 2023-05-15 PROBLEM — M25.521 RIGHT ELBOW PAIN: Status: RESOLVED | Noted: 2021-11-01 | Resolved: 2023-05-15

## 2023-05-15 PROBLEM — I10 ESSENTIAL HYPERTENSION: Chronic | Status: RESOLVED | Noted: 2021-03-12 | Resolved: 2023-05-15

## 2023-05-15 PROBLEM — I48.0 PAF (PAROXYSMAL ATRIAL FIBRILLATION): Chronic | Status: ACTIVE | Noted: 2021-06-25

## 2023-05-15 PROBLEM — E80.6 HYPERBILIRUBINEMIA: Chronic | Status: RESOLVED | Noted: 2019-11-25 | Resolved: 2023-05-15

## 2023-05-15 PROBLEM — R11.10 HYPEREMESIS: Status: RESOLVED | Noted: 2021-05-16 | Resolved: 2023-05-15

## 2023-05-15 PROCEDURE — 3075F PR MOST RECENT SYSTOLIC BLOOD PRESS GE 130-139MM HG: ICD-10-PCS | Mod: CPTII,S$GLB,, | Performed by: FAMILY MEDICINE

## 2023-05-15 PROCEDURE — 3008F PR BODY MASS INDEX (BMI) DOCUMENTED: ICD-10-PCS | Mod: CPTII,S$GLB,, | Performed by: FAMILY MEDICINE

## 2023-05-15 PROCEDURE — 3078F DIAST BP <80 MM HG: CPT | Mod: CPTII,S$GLB,, | Performed by: FAMILY MEDICINE

## 2023-05-15 PROCEDURE — 99451 NTRPROF PH1/NTRNET/EHR 5/>: CPT | Mod: ,,, | Performed by: STUDENT IN AN ORGANIZED HEALTH CARE EDUCATION/TRAINING PROGRAM

## 2023-05-15 PROCEDURE — 3078F PR MOST RECENT DIASTOLIC BLOOD PRESSURE < 80 MM HG: ICD-10-PCS | Mod: CPTII,S$GLB,, | Performed by: FAMILY MEDICINE

## 2023-05-15 PROCEDURE — 3008F BODY MASS INDEX DOCD: CPT | Mod: CPTII,S$GLB,, | Performed by: FAMILY MEDICINE

## 2023-05-15 PROCEDURE — 1159F PR MEDICATION LIST DOCUMENTED IN MEDICAL RECORD: ICD-10-PCS | Mod: CPTII,S$GLB,, | Performed by: FAMILY MEDICINE

## 2023-05-15 PROCEDURE — 99999 PR PBB SHADOW E&M-EST. PATIENT-LVL IV: ICD-10-PCS | Mod: PBBFAC,,, | Performed by: FAMILY MEDICINE

## 2023-05-15 PROCEDURE — 1160F RVW MEDS BY RX/DR IN RCRD: CPT | Mod: CPTII,S$GLB,, | Performed by: FAMILY MEDICINE

## 2023-05-15 PROCEDURE — 99214 PR OFFICE/OUTPT VISIT, EST, LEVL IV, 30-39 MIN: ICD-10-PCS | Mod: S$GLB,,, | Performed by: FAMILY MEDICINE

## 2023-05-15 PROCEDURE — 1160F PR REVIEW ALL MEDS BY PRESCRIBER/CLIN PHARMACIST DOCUMENTED: ICD-10-PCS | Mod: CPTII,S$GLB,, | Performed by: FAMILY MEDICINE

## 2023-05-15 PROCEDURE — 99999 PR PBB SHADOW E&M-EST. PATIENT-LVL IV: CPT | Mod: PBBFAC,,, | Performed by: FAMILY MEDICINE

## 2023-05-15 PROCEDURE — 99451 PR INTERPROF, PHONE/INTERNET/EHR, CONSULT, >= 5MINS: ICD-10-PCS | Mod: ,,, | Performed by: STUDENT IN AN ORGANIZED HEALTH CARE EDUCATION/TRAINING PROGRAM

## 2023-05-15 PROCEDURE — 99214 OFFICE O/P EST MOD 30 MIN: CPT | Mod: S$GLB,,, | Performed by: FAMILY MEDICINE

## 2023-05-15 PROCEDURE — 1159F MED LIST DOCD IN RCRD: CPT | Mod: CPTII,S$GLB,, | Performed by: FAMILY MEDICINE

## 2023-05-15 PROCEDURE — 3075F SYST BP GE 130 - 139MM HG: CPT | Mod: CPTII,S$GLB,, | Performed by: FAMILY MEDICINE

## 2023-05-15 RX ORDER — TRIAMCINOLONE ACETONIDE 1 MG/G
OINTMENT TOPICAL 2 TIMES DAILY PRN
Qty: 80 G | Refills: 0 | Status: SHIPPED | OUTPATIENT
Start: 2023-05-15 | End: 2023-08-31

## 2023-05-15 NOTE — ASSESSMENT & PLAN NOTE
He reports no recurrence since original episode. He had cardiac work-up after the episode and was told no further evaluation or treatment was indicated.

## 2023-05-15 NOTE — CONSULTS
[facility]  Response for E-Consult     Patient Name: Arian Farris  MRN: [id number]  Primary Care Provider: SUSAN Jo MD   Requesting Provider: SUSAN Jo MD  E-Consult to Dermatology  Consult performed by: Deborah Jara MD  Consult ordered by: SUSAN Jo MD  Reason for consult: Rash  Assessment/Recommendations: Thank you for this consult. I favor that it is nummular dermatitis if there is no history of a contact allergen. I would initiate treatment with triamcinolone 0.1% ointment, gentle soaps and fragrance free products, and frequent moisturizing.           Findings: Thank you for this consult. I favor that it is nummular dermatitis if there is no history of a contact allergen. I would initiate treatment with triamcinolone 0.1% ointment (as it appears you have), gentle soaps and fragrance free products, and frequent moisturizing.     I did not speak to the requesting provider verbally about this.     Total time of Consultation: 5 minute    Percentage of time spent on written/verbal discussion: 100%     *This eConsult is based on the clinical data available to me and is furnished without benefit of a physical examination. The eConsult will need to be interpreted in light of any clinical issues or changes in patient status not available to me at the time of filing this eConsults. Significant changes in patient condition or level of acuity should result in immediate formal consultation and reevaluation. Please alert me if you have further questions.    Thank you for your consult.     Deborah Jara MD  [facility]   20

## 2023-05-15 NOTE — PROGRESS NOTES
OFFICE VISIT 5/15/23  8:00 AM CDT    Subjective   CHIEF COMPLAINT: Rash    He reports that he's had chronic recurrent eczema since childhood. He says that it typically occurs in patches on his lower legs. He has a few patches today, a couple of which I took photos of. He says that the eczema is typically itchy. He reports new problem of rash patches on on his right upper shoulder as shown in the photo. He says these occurred about three weeks ago, and they are not pruritic. We discussed differential diagnosis. It was agreed to treat empirically with topical corticosteroids while obtaining dermatology e-consult.      Review of Systems   Constitutional:  Negative for fatigue and fever.   HENT:  Negative for nasal congestion, rhinorrhea and sore throat.    Eyes:  Negative for pain.   Respiratory:  Negative for cough and shortness of breath.    Gastrointestinal:  Negative for diarrhea and vomiting.   Integumentary:  Positive for rash.       Objective   Vitals:    05/15/23 0806   BP: 138/78   BP Location: Right arm   Patient Position: Sitting   BP Method: Medium (Manual)   Pulse: 73   Temp: 97.6 °F (36.4 °C)   TempSrc: Tympanic   SpO2: 98%   Weight: 70.8 kg (156 lb 1.4 oz)   Height: 6' (1.829 m)   Physical Exam  Vitals reviewed.   Constitutional:       General: He is not in acute distress.     Appearance: Normal appearance. He is not ill-appearing or diaphoretic.   Cardiovascular:      Rate and Rhythm: Regular rhythm.   Pulmonary:      Effort: Pulmonary effort is normal.      Breath sounds: Normal breath sounds.   Skin:     General: Skin is warm and dry.      Findings: Rash (lightly rough patched shown in photos) present.   Neurological:      Mental Status: He is alert and oriented to person, place, and time. Mental status is at baseline.   Psychiatric:         Mood and Affect: Mood normal.         Behavior: Behavior normal.         Judgment: Judgment normal.                Assessment and Plan   1. Rash and nonspecific  skin eruption  -     triamcinolone acetonide 0.1% (KENALOG) 0.1 % ointment; Apply topically 2 (two) times daily as needed (for rash). Do not use for more than 12 days per month.  Dispense: 80 g; Refill: 0    2. Lone atrial fibrillation  Overview:  He had about 90 minutes of rapid AF in the ED in May 2021 during a presentation for nausea.    Assessment & Plan:  He reports no recurrence since original episode. He had cardiac work-up after the episode and was told no further evaluation or treatment was indicated.      3. Intrinsic eczema  -     triamcinolone acetonide 0.1% (KENALOG) 0.1 % ointment; Apply topically 2 (two) times daily as needed (for rash). Do not use for more than 12 days per month.  Dispense: 80 g; Refill: 0    4. Migraine without aura and without status migrainosus, not intractable  Assessment & Plan:  This is a chronic problem that appears compensated/controlled and stable using OTC meds PRN.      5. Cyclic vomiting syndrome  Assessment & Plan:  This is a chronic problem that appears compensated/controlled and stable.       Unless noted herein, any chronic conditions are represented as and appear stable, and no other significant complaints or concerns were reported.    Follow up in about 4 months (around 9/15/2023) for annual wellness exam.   Future Appointments   Date Time Provider Department Center   9/5/2023  2:20 PM SUSAN Jo MD [facility]        Assessment and Plan    1. Rash and nonspecific skin eruption  - triamcinolone acetonide 0.1% (KENALOG) 0.1 % ointment; Apply topically 2 (two) times daily as needed (for rash). Do not use for more than 12 days per month.  Dispense: 80 g; Refill: 0    2. Lone atrial fibrillation    3. Intrinsic eczema  - triamcinolone acetonide 0.1% (KENALOG) 0.1 % ointment; Apply topically 2 (two) times daily as needed (for rash). Do not use for more than 12 days per month.  Dispense: 80 g; Refill: 0    4. Migraine without aura and without status  "migrainosus, not intractable    5. Cyclic vomiting syndrome    Orders Placed This Encounter    triamcinolone acetonide 0.1% (KENALOG) 0.1 % ointment        Documentation entered by me for this encounter may have been done in part using speech-recognition technology. Although I have made an effort to ensure accuracy, "sound like" errors may exist and should be interpreted in context.   "

## 2023-05-28 ENCOUNTER — PATIENT MESSAGE (OUTPATIENT)
Dept: INTERNAL MEDICINE | Facility: CLINIC | Age: 23
End: 2023-05-28
Payer: COMMERCIAL

## 2023-08-31 ENCOUNTER — OFFICE VISIT (OUTPATIENT)
Dept: INTERNAL MEDICINE | Facility: CLINIC | Age: 23
End: 2023-08-31
Payer: COMMERCIAL

## 2023-08-31 ENCOUNTER — LAB VISIT (OUTPATIENT)
Dept: LAB | Facility: HOSPITAL | Age: 23
End: 2023-08-31
Attending: FAMILY MEDICINE
Payer: COMMERCIAL

## 2023-08-31 VITALS
RESPIRATION RATE: 18 BRPM | BODY MASS INDEX: 21.74 KG/M2 | HEIGHT: 72 IN | SYSTOLIC BLOOD PRESSURE: 138 MMHG | TEMPERATURE: 97 F | HEART RATE: 110 BPM | WEIGHT: 160.5 LBS | DIASTOLIC BLOOD PRESSURE: 72 MMHG | OXYGEN SATURATION: 100 %

## 2023-08-31 DIAGNOSIS — Z00.00 PREVENTATIVE HEALTH CARE: ICD-10-CM

## 2023-08-31 DIAGNOSIS — Z11.3 ROUTINE SCREENING FOR STI (SEXUALLY TRANSMITTED INFECTION): ICD-10-CM

## 2023-08-31 DIAGNOSIS — Z11.4 SCREENING FOR HIV WITHOUT PRESENCE OF RISK FACTORS: ICD-10-CM

## 2023-08-31 DIAGNOSIS — Z00.00 PREVENTATIVE HEALTH CARE: Primary | ICD-10-CM

## 2023-08-31 PROCEDURE — 3008F PR BODY MASS INDEX (BMI) DOCUMENTED: ICD-10-PCS | Mod: CPTII,S$GLB,, | Performed by: FAMILY MEDICINE

## 2023-08-31 PROCEDURE — 87389 HIV-1 AG W/HIV-1&-2 AB AG IA: CPT | Performed by: FAMILY MEDICINE

## 2023-08-31 PROCEDURE — 36415 COLL VENOUS BLD VENIPUNCTURE: CPT | Performed by: FAMILY MEDICINE

## 2023-08-31 PROCEDURE — 99999 PR PBB SHADOW E&M-EST. PATIENT-LVL III: ICD-10-PCS | Mod: PBBFAC,,, | Performed by: FAMILY MEDICINE

## 2023-08-31 PROCEDURE — 1160F PR REVIEW ALL MEDS BY PRESCRIBER/CLIN PHARMACIST DOCUMENTED: ICD-10-PCS | Mod: CPTII,S$GLB,, | Performed by: FAMILY MEDICINE

## 2023-08-31 PROCEDURE — 3075F PR MOST RECENT SYSTOLIC BLOOD PRESS GE 130-139MM HG: ICD-10-PCS | Mod: CPTII,S$GLB,, | Performed by: FAMILY MEDICINE

## 2023-08-31 PROCEDURE — 99999 PR PBB SHADOW E&M-EST. PATIENT-LVL III: CPT | Mod: PBBFAC,,, | Performed by: FAMILY MEDICINE

## 2023-08-31 PROCEDURE — 3078F PR MOST RECENT DIASTOLIC BLOOD PRESSURE < 80 MM HG: ICD-10-PCS | Mod: CPTII,S$GLB,, | Performed by: FAMILY MEDICINE

## 2023-08-31 PROCEDURE — 99395 PR PREVENTIVE VISIT,EST,18-39: ICD-10-PCS | Mod: S$GLB,,, | Performed by: FAMILY MEDICINE

## 2023-08-31 PROCEDURE — 1160F RVW MEDS BY RX/DR IN RCRD: CPT | Mod: CPTII,S$GLB,, | Performed by: FAMILY MEDICINE

## 2023-08-31 PROCEDURE — 86592 SYPHILIS TEST NON-TREP QUAL: CPT | Performed by: FAMILY MEDICINE

## 2023-08-31 PROCEDURE — 99395 PREV VISIT EST AGE 18-39: CPT | Mod: S$GLB,,, | Performed by: FAMILY MEDICINE

## 2023-08-31 PROCEDURE — 1159F MED LIST DOCD IN RCRD: CPT | Mod: CPTII,S$GLB,, | Performed by: FAMILY MEDICINE

## 2023-08-31 PROCEDURE — 3008F BODY MASS INDEX DOCD: CPT | Mod: CPTII,S$GLB,, | Performed by: FAMILY MEDICINE

## 2023-08-31 PROCEDURE — 3078F DIAST BP <80 MM HG: CPT | Mod: CPTII,S$GLB,, | Performed by: FAMILY MEDICINE

## 2023-08-31 PROCEDURE — 3075F SYST BP GE 130 - 139MM HG: CPT | Mod: CPTII,S$GLB,, | Performed by: FAMILY MEDICINE

## 2023-08-31 PROCEDURE — 1159F PR MEDICATION LIST DOCUMENTED IN MEDICAL RECORD: ICD-10-PCS | Mod: CPTII,S$GLB,, | Performed by: FAMILY MEDICINE

## 2023-08-31 NOTE — PROGRESS NOTES
ANNUAL WELLNESS VISIT (PREVENTIVE SERVICES)  8/31/23  4:00 PM CDT    CHIEF COMPLAINT: Annual Exam    HEALTH MAINTENANCE INTERVENTIONS - UP TO DATE  Health Maintenance Topics with due status: Not Due       Topic Last Completion Date    TETANUS VACCINE 09/01/2022    Influenza Vaccine 09/01/2022     HEALTH MAINTENANCE INTERVENTIONS - DUE OR DUE SOON  Health Maintenance Due   Topic Date Due    Pneumococcal Vaccines (Age 0-64) (1 - PCV) 03/13/2004    COVID-19 Vaccine (3 - Pfizer series) 08/12/2021      Arian is here for annual wellness and preventive services visit. Arian reports that he is doing well. He reports no complaints or concerns. He reports no specific exposure or risk factor or specific relevant symptoms, but he accepted offer for HIV & STI screening.       1. Preventative health care  -     RPR; Future; Expected date: 08/31/2023  -     C. trachomatis/N. gonorrhoeae by AMP DNA [facility]; Urine; Future; Expected date: 08/31/2023  -     HIV 1/2 Ag/Ab (4th Gen); Future; Expected date: 08/31/2023    2. Routine screening for STI (sexually transmitted infection)  -     RPR; Future; Expected date: 08/31/2023  -     C. trachomatis/N. gonorrhoeae by AMP DNA [facility]; Urine; Future; Expected date: 08/31/2023    3. Screening for HIV without presence of risk factors  -     HIV 1/2 Ag/Ab (4th Gen); Future; Expected date: 08/31/2023      Review of Systems   Constitutional:  Negative for activity change and unexpected weight change.   HENT:  Negative for hearing loss, rhinorrhea and trouble swallowing.    Eyes:  Negative for discharge and visual disturbance.   Respiratory:  Negative for chest tightness and wheezing.    Cardiovascular:  Negative for chest pain and palpitations.   Gastrointestinal:  Negative for blood in stool, constipation, diarrhea and vomiting.   Endocrine: Negative for polydipsia and polyuria.   Genitourinary:  Negative for difficulty urinating, hematuria and urgency.   Musculoskeletal:  Negative for  arthralgias, joint swelling and neck pain.   Neurological:  Negative for weakness and headaches.   Psychiatric/Behavioral:  Negative for confusion and dysphoric mood.        Vitals:    08/31/23 1547 08/31/23 1611   BP: 138/72    BP Location: Right arm    Patient Position: Sitting    BP Method: Large (Manual)    Pulse: (!) 127 110   Resp: 18    Temp: 97.1 °F (36.2 °C)    SpO2: 100%    Weight: 72.8 kg (160 lb 7.9 oz)    Height: 6' (1.829 m)    Physical Exam  Vitals reviewed.   Constitutional:       General: He is not in acute distress.     Appearance: Normal appearance. He is not ill-appearing, toxic-appearing or diaphoretic.   HENT:      Head: Normocephalic and atraumatic.      Right Ear: Tympanic membrane, ear canal and external ear normal.      Left Ear: Tympanic membrane, ear canal and external ear normal.      Ears:      Comments: Hearing grossly intact.  Eyes:      General: No scleral icterus.     Conjunctiva/sclera: Conjunctivae normal.   Neck:      Vascular: No carotid bruit.   Cardiovascular:      Rate and Rhythm: Normal rate and regular rhythm.      Heart sounds: Normal heart sounds.   Pulmonary:      Effort: Pulmonary effort is normal.      Breath sounds: Normal breath sounds.   Abdominal:      General: Bowel sounds are normal. There is no distension.      Palpations: Abdomen is soft. There is no mass.      Tenderness: There is no abdominal tenderness.   Musculoskeletal:         General: No tenderness.      Cervical back: No muscular tenderness.   Lymphadenopathy:      Cervical: No cervical adenopathy.   Skin:     General: Skin is warm and dry.      Coloration: Skin is not jaundiced.   Neurological:      General: No focal deficit present.      Mental Status: He is alert and oriented to person, place, and time.      Cranial Nerves: No cranial nerve deficit.      Gait: Gait normal.   Psychiatric:         Mood and Affect: Mood normal.         Behavior: Behavior normal.         Judgment: Judgment normal.  "      Follow up in about 1 year (around 8/31/2024) for annual exam.     Documentation entered by me for this encounter may have been done in part using speech-recognition technology. Although I have made an effort to ensure accuracy, "sound like" errors may exist and should be interpreted in context.   "

## 2023-09-01 LAB — HIV 1+2 AB+HIV1 P24 AG SERPL QL IA: NORMAL

## 2023-09-02 LAB — RPR SER QL: NORMAL

## 2023-10-05 ENCOUNTER — OFFICE VISIT (OUTPATIENT)
Dept: DERMATOLOGY | Facility: CLINIC | Age: 23
End: 2023-10-05
Payer: COMMERCIAL

## 2023-10-05 DIAGNOSIS — L81.4 LENTIGO: Primary | ICD-10-CM

## 2023-10-05 DIAGNOSIS — D22.9 MULTIPLE BENIGN NEVI: ICD-10-CM

## 2023-10-05 DIAGNOSIS — L64.9 ANDROGENETIC ALOPECIA: ICD-10-CM

## 2023-10-05 DIAGNOSIS — D18.01 CHERRY ANGIOMA: ICD-10-CM

## 2023-10-05 PROCEDURE — 1159F MED LIST DOCD IN RCRD: CPT | Mod: CPTII,S$GLB,, | Performed by: STUDENT IN AN ORGANIZED HEALTH CARE EDUCATION/TRAINING PROGRAM

## 2023-10-05 PROCEDURE — 99999 PR PBB SHADOW E&M-EST. PATIENT-LVL III: ICD-10-PCS | Mod: PBBFAC,,, | Performed by: STUDENT IN AN ORGANIZED HEALTH CARE EDUCATION/TRAINING PROGRAM

## 2023-10-05 PROCEDURE — 1160F RVW MEDS BY RX/DR IN RCRD: CPT | Mod: CPTII,S$GLB,, | Performed by: STUDENT IN AN ORGANIZED HEALTH CARE EDUCATION/TRAINING PROGRAM

## 2023-10-05 PROCEDURE — 1159F PR MEDICATION LIST DOCUMENTED IN MEDICAL RECORD: ICD-10-PCS | Mod: CPTII,S$GLB,, | Performed by: STUDENT IN AN ORGANIZED HEALTH CARE EDUCATION/TRAINING PROGRAM

## 2023-10-05 PROCEDURE — 99203 PR OFFICE/OUTPT VISIT, NEW, LEVL III, 30-44 MIN: ICD-10-PCS | Mod: S$GLB,,, | Performed by: STUDENT IN AN ORGANIZED HEALTH CARE EDUCATION/TRAINING PROGRAM

## 2023-10-05 PROCEDURE — 99203 OFFICE O/P NEW LOW 30 MIN: CPT | Mod: S$GLB,,, | Performed by: STUDENT IN AN ORGANIZED HEALTH CARE EDUCATION/TRAINING PROGRAM

## 2023-10-05 PROCEDURE — 99999 PR PBB SHADOW E&M-EST. PATIENT-LVL III: CPT | Mod: PBBFAC,,, | Performed by: STUDENT IN AN ORGANIZED HEALTH CARE EDUCATION/TRAINING PROGRAM

## 2023-10-05 PROCEDURE — 1160F PR REVIEW ALL MEDS BY PRESCRIBER/CLIN PHARMACIST DOCUMENTED: ICD-10-PCS | Mod: CPTII,S$GLB,, | Performed by: STUDENT IN AN ORGANIZED HEALTH CARE EDUCATION/TRAINING PROGRAM

## 2023-10-05 NOTE — PATIENT INSTRUCTIONS
Sunscreen Guidelines  Sunscreen protects your skin by absorbing and reflecting ultraviolet rays. All sunscreens have a sun protection factor (SPF) rating that indicates how long a sunscreen will remain effective on the skin.    Why protect your skin?  The sun's rays are composed of many different wavelengths, including UVA, UVB, and visible light that each affect the skin differently.    UVB: sunburn, photoaging, skin cancer (melanoma, basal cell, and squamous cell carcinomas) and modulation of the skin's immune system.    UVA: similar to above but thought to contribute more to aging; at the same dose of UVB it is less powerful however the sun has 10-20 times the levels of UVA as compared with UVB.  Visible light: implicated in causing unwanted darkening of skin, such as melasma and post-inflammatory hyperpigmentation in darker skin types     If I have dark skin, do I need to worry about the sun?    More darkly pigmented skin is more protected against UV-induced skin cancer, sunburn, and photoaging, though may still suffer from sun-related conditions, including melasma, hyperpigmentation, and other dark spots.    Sun avoidance  As a general rule, stay out of the sun as much as possible between 10 a.m. - 4 p.m.    Download the EPA UV index sailaja to track the UV index by hour in your zip code.      Which sunscreen should I choose?  The best sunscreen to use varies by individual. The one that feels best on your skin and fits your lifestyle will be the one you will likely use most regularly.   Active ingredients of sunscreen vary by , and may be a chemical (such as avobenzone or oxybenzone) or physical agent (such as zinc oxide or titanium dioxide). I recommend a physical agent.  A water-resistant sunscreen is one that maintains the SPF level after 40 minutes of water exposure. A very water-resistant sunscreen maintains the SPF level after 80 minutes of water exposure.    Sunscreen: this is the last layer in  "sun protection   Be generous: 1 shot glass of sunscreen for your body, ½ teaspoon for your face/neck  Reapply every 2 hours  Broad spectrum (provides UVA/UVB protection), SPF 50 or above  Avoid spray sunscreens: less effective and have been found to contain benzene (carcinogen)    Sun protective clothing  Although sunscreen helps minimize sun damage, no sunscreen completely blocks all wavelengths of UV light. Wearing sun protective clothing such as hats, rashguards or swim shirts, and long sleeves and/or pants, as well as avoiding sun exposure from 10 a.m. to 4 p.m. will help protect your skin from overexposure and minimize sun damage. Seek shade.  Long sleeved clothing, hats, and sunglasses: makes sun protection easier, more effective, and can even be more affordable, since sunscreen needs to be reapplied frequently.    Solumbra (www.sunprectautions.[employer])  ElectroCore (www.Fuelzee)  Coolibar (www.Intelligent Fingerprinting.[employer])  Land's end (www.[employer])  Hats from [employer] (www.helenkaminski.com)    My Favorite Sunscreens:  Physical blockers: Can have a "white case" but in general are more effective  - Face: CeraVe tinted mineral sunscreen, Bare Minerals complexion rescue (20 shades), Elta MD (UV elements, UV physical, UV restore, etc), Tizo ultra zinc tinted, Cetaphil Sheer Mineral Face Liquid Sunscreen  - Body: Blue Lizard, Neutrogena Sheer Zinc, Eucerin Daily Protection, Aveeno Baby  "

## 2023-10-05 NOTE — PROGRESS NOTES
Subjective:      Patient ID:  Arian Farris is a 23 y.o. male who presents for   Chief Complaint   Patient presents with    Mole     back     Patient is here today for mole check.   Pt has a history of  moderate sun exposure in the past.   Pt recalls several blistering sunburns in the past- yes  Pt has history of tanning bed use- no  Pt has  had moles removed in the past- no  Pt has history of melanoma in first degree relatives-  yes, father     Mole - Initial  Affected locations: back  Duration: 2 weeks  Signs / symptoms: bleeding  Aggravated by: scratching  Relieving factors/Treatments tried: nothing      Review of Systems   Skin:  Positive for activity-related sunscreen use. Negative for daily sunscreen use.   All other systems reviewed and are negative.      Objective:   Physical Exam   Constitutional: He appears well-developed and well-nourished. No distress.   Neurological: He is alert and oriented to person, place, and time. He is not disoriented.   Psychiatric: He has a normal mood and affect.   Skin:   Areas Examined (abnormalities noted in diagram):   Back Inspection Performed                 Diagram Legend     Erythematous scaling macule/papule c/w actinic keratosis       Vascular papule c/w angioma      Pigmented verrucoid papule/plaque c/w seborrheic keratosis      Yellow umbilicated papule c/w sebaceous hyperplasia      Irregularly shaped tan macule c/w lentigo     1-2 mm smooth white papules consistent with Milia      Movable subcutaneous cyst with punctum c/w epidermal inclusion cyst      Subcutaneous movable cyst c/w pilar cyst      Firm pink to brown papule c/w dermatofibroma      Pedunculated fleshy papule(s) c/w skin tag(s)      Evenly pigmented macule c/w junctional nevus     Mildly variegated pigmented, slightly irregular-bordered macule c/w mildly atypical nevus      Flesh colored to evenly pigmented papule c/w intradermal nevus       Pink pearly papule/plaque c/w basal cell carcinoma       Erythematous hyperkeratotic cursted plaque c/w SCC      Surgical scar with no sign of skin cancer recurrence      Open and closed comedones      Inflammatory papules and pustules      Verrucoid papule consistent consistent with wart     Erythematous eczematous patches and plaques     Dystrophic onycholytic nail with subungual debris c/w onychomycosis     Umbilicated papule    Erythematous-base heme-crusted tan verrucoid plaque consistent with inflamed seborrheic keratosis     Erythematous Silvery Scaling Plaque c/w Psoriasis     See annotation      Assessment / Plan:        Lentigo  Reassurance given to patient. No treatment is necessary.     Multiple benign nevi  Discussed ABCDE's of nevi.  Monitor for new mole or moles that are becoming bigger, darker, irritated, or developing irregular borders. Instructed patient to observe lesion(s) for changes and follow up in clinic if changes are noted. Patient to monitor skin at home for new or changing lesions.     Cherry angioma  Reassurance given to patient. No treatment is necessary.     - Recommended the use of daily sunscreen and counseled on its role as a preventative measure for photoaging, sunburns, and skin cancer and to prevent the worsening of photodermatoses and pigmentary disorders (eg, melasma and postinflammatory hyperpigmentation). Preferred products at least SPF 30 and are mineral based such as Elta MD tinted broad spectrum SPF 40, Neutrogenia Sheer Zinc SPF 50, CeraVe Tinted mineral SPF 30, Blue lizard mineral sunscreen, Sun Bum mineral sunscreen. Handout provided   - Also counseled on the use of photoprotective clothing, such as from Coolibar and Solumbra   - Avoiding peak sunlight hours from 10 am - 4 pm     Androgenetic alopecia  - discussed chronicity of disease   - discussed treatment options including topical and systemic medications, PRP, hair transplant   -  start minoxidil 5% solution or foam twice daily. Discussed that pt needs to use  medication daily for efficacy. May take 6-12 months to see an improvement. Wash hands after using          Follow up if symptoms worsen or fail to improve.

## 2023-10-19 DIAGNOSIS — M51.36 DDD (DEGENERATIVE DISC DISEASE), LUMBAR: Primary | ICD-10-CM
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
## 2023-10-20 ENCOUNTER — HOSPITAL ENCOUNTER (OUTPATIENT)
Dept: RADIOLOGY | Facility: HOSPITAL | Age: 23
Discharge: HOME | End: 2023-10-20
Attending: ORTHOPAEDIC SURGERY
Payer: COMMERCIAL

## 2023-10-20 ENCOUNTER — OFFICE VISIT (OUTPATIENT)
Dept: ORTHOPEDICS | Facility: CLINIC | Age: 23
End: 2023-10-20
Payer: COMMERCIAL

## 2023-10-20 VITALS — BODY MASS INDEX: 21.86 KG/M2 | WEIGHT: 161.19 LBS

## 2023-10-20 DIAGNOSIS — M51.36 DDD (DEGENERATIVE DISC DISEASE), LUMBAR: ICD-10-CM

## 2023-10-20 DIAGNOSIS — M54.16 LUMBAR RADICULOPATHY, CHRONIC: Primary | ICD-10-CM

## 2023-10-20 PROCEDURE — 99999 PR PBB SHADOW E&M-EST. PATIENT-LVL II: ICD-10-PCS | Mod: PBBFAC,,, | Performed by: ORTHOPAEDIC SURGERY

## 2023-10-20 PROCEDURE — 99214 OFFICE O/P EST MOD 30 MIN: CPT | Mod: S$GLB,,, | Performed by: ORTHOPAEDIC SURGERY

## 2023-10-20 PROCEDURE — 72110 XR LUMBAR SPINE AP AND LAT WITH FLEX/EXT: ICD-10-PCS | Mod: 26,,, | Performed by: RADIOLOGY

## 2023-10-20 PROCEDURE — 3008F PR BODY MASS INDEX (BMI) DOCUMENTED: ICD-10-PCS | Mod: CPTII,S$GLB,, | Performed by: ORTHOPAEDIC SURGERY

## 2023-10-20 PROCEDURE — 99999 PR PBB SHADOW E&M-EST. PATIENT-LVL II: CPT | Mod: PBBFAC,,, | Performed by: ORTHOPAEDIC SURGERY

## 2023-10-20 PROCEDURE — 72110 X-RAY EXAM L-2 SPINE 4/>VWS: CPT | Mod: TC

## 2023-10-20 PROCEDURE — 3008F BODY MASS INDEX DOCD: CPT | Mod: CPTII,S$GLB,, | Performed by: ORTHOPAEDIC SURGERY

## 2023-10-20 PROCEDURE — 99214 PR OFFICE/OUTPT VISIT, EST, LEVL IV, 30-39 MIN: ICD-10-PCS | Mod: S$GLB,,, | Performed by: ORTHOPAEDIC SURGERY

## 2023-10-20 PROCEDURE — 1159F MED LIST DOCD IN RCRD: CPT | Mod: CPTII,S$GLB,, | Performed by: ORTHOPAEDIC SURGERY

## 2023-10-20 PROCEDURE — 1159F PR MEDICATION LIST DOCUMENTED IN MEDICAL RECORD: ICD-10-PCS | Mod: CPTII,S$GLB,, | Performed by: ORTHOPAEDIC SURGERY

## 2023-10-20 PROCEDURE — 72110 X-RAY EXAM L-2 SPINE 4/>VWS: CPT | Mod: 26,,, | Performed by: RADIOLOGY

## 2023-10-20 RX ORDER — METHOCARBAMOL 750 MG/1
750 TABLET, FILM COATED ORAL NIGHTLY PRN
Qty: 30 TABLET | Refills: 0 | Status: SHIPPED | OUTPATIENT
Start: 2023-10-20 | End: 2024-01-18

## 2023-10-20 RX ORDER — DICLOFENAC SODIUM 75 MG/1
75 TABLET, DELAYED RELEASE ORAL 2 TIMES DAILY
Qty: 60 TABLET | Refills: 0 | Status: SHIPPED | OUTPATIENT
Start: 2023-10-20

## 2023-10-20 RX ORDER — METHYLPREDNISOLONE 4 MG/1
TABLET ORAL
Qty: 1 EACH | Refills: 0 | Status: SHIPPED | OUTPATIENT
Start: 2023-10-20 | End: 2023-11-10

## 2023-10-21 ENCOUNTER — HOSPITAL ENCOUNTER (OUTPATIENT)
Dept: RADIOLOGY | Facility: OTHER | Age: 23
Discharge: HOME | End: 2023-10-21
Attending: ORTHOPAEDIC SURGERY
Payer: COMMERCIAL

## 2023-10-21 DIAGNOSIS — M54.16 LUMBAR RADICULOPATHY, CHRONIC: ICD-10-CM

## 2023-10-21 PROCEDURE — 72148 MRI LUMBAR SPINE W/O DYE: CPT | Mod: TC

## 2023-10-21 PROCEDURE — 72148 MRI LUMBAR SPINE WITHOUT CONTRAST: ICD-10-PCS | Mod: 26,,, | Performed by: RADIOLOGY

## 2023-10-21 PROCEDURE — 72148 MRI LUMBAR SPINE W/O DYE: CPT | Mod: 26,,, | Performed by: RADIOLOGY

## 2023-10-30 ENCOUNTER — OFFICE VISIT (OUTPATIENT)
Dept: ORTHOPEDICS | Facility: CLINIC | Age: 23
End: 2023-10-30
Payer: COMMERCIAL

## 2023-10-30 DIAGNOSIS — M54.16 LUMBAR RADICULOPATHY, CHRONIC: Primary | ICD-10-CM

## 2023-10-30 PROCEDURE — 99213 PR OFFICE/OUTPT VISIT, EST, LEVL III, 20-29 MIN: ICD-10-PCS | Mod: 95,,, | Performed by: ORTHOPAEDIC SURGERY

## 2023-10-30 PROCEDURE — 99213 OFFICE O/P EST LOW 20 MIN: CPT | Mod: 95,,, | Performed by: ORTHOPAEDIC SURGERY

## 2023-10-30 NOTE — PROGRESS NOTES
Established Patient - Audio Only Telehealth Visit     The patient location is: home  The chief complaint leading to consultation is: MRI results  Visit type: Virtual visit with audio only (telephone)  Total time spent with patient: 10 min       The reason for the audio only service rather than synchronous audio and video virtual visit was related to technical difficulties or patient preference/necessity.     Each patient to whom I provide medical services by telemedicine is:  (1) informed of the relationship between the physician and patient and the respective role of any other health care provider with respect to management of the patient; and (2) notified that they may decline to receive medical services by telemedicine and may withdraw from such care at any time. Patient verbally consented to receive this service via voice-only telephone call.    DATE: 10/30/2023  PATIENT: Arian Farris    Attending Physician: Adama Sloan MD    HISTORY:  Arian Farris is a 23 y.o. male who returns to me today for MRI results.  He was last seen by me 10/20/2023.  Today he is doing well but notes he continues to have low back and bilateral leg pain.    The Patient denies myelopathic symptoms such as handwriting changes or difficulty with buttons/coins/keys. Denies perineal paresthesias, bowel/bladder dysfunction.    PMH/PSH/FamHx/SocHx:  Unchanged from prior visit    ROS:  REVIEW OF SYSTEMS:  Constitution: Negative. Negative for chills, fever and night sweats.   HENT: Negative for congestion and headaches.    Eyes: Negative for blurred vision, left vision loss and right vision loss.   Cardiovascular: Negative for chest pain and syncope.   Respiratory: Negative for cough and shortness of breath.    Endocrine: Negative for polydipsia, polyphagia and polyuria.   Hematologic/Lymphatic: Negative for bleeding problem. Does not bruise/bleed easily.   Skin: Negative for dry skin, itching and rash.   Musculoskeletal: Negative  for falls and muscle weakness.   Gastrointestinal: Negative for abdominal pain and bowel incontinence.   Allergic/Immunologic: Negative for hives and persistent infections.  Genitourinary: Negative for urinary retention/incontinence and nocturia.   Neurological: negative for disturbances in coordination, no myelopathic symptoms such as handwriting changes or difficulty with buttons, coins, keys or small objects. No loss of balance and seizures.   Psychiatric/Behavioral: Negative for depression. The patient does not have insomnia.   Denies perineal paresthesias, bowel or bladder incontinence    EXAM:  There were no vitals taken for this visit.    My physical examination was notable for the following findings:     Musculoskeletal and neuro exam stable      IMAGING:    Today I personally re- reviewed AP, Lat and Flex/Ex  upright L-spine that demonstrate mild scoliosis without degenerative changes     MRI lumbar demonstrates mild disc bulges and neural foraminal narrowing at L4-5 L5-S1    There is no height or weight on file to calculate BMI.    Hemoglobin A1C   Date Value Ref Range Status   09/01/2022 4.6 4.0 - 5.6 % Final     Comment:     ADA Screening Guidelines:  5.7-6.4%  Consistent with prediabetes  >or=6.5%  Consistent with diabetes    High levels of fetal hemoglobin interfere with the HbA1C  assay. Heterozygous hemoglobin variants (HbS, HgC, etc)do  not significantly interfere with this assay.   However, presence of multiple variants may affect accuracy.           ASSESSMENT/PLAN:    There are no diagnoses linked to this encounter.    Today we discussed at length all of the different treatment options including anti-inflammatories, acetaminophen, rest, ice, heat, physical therapy including strengthening and stretching exercises, home exercises, ROM, aerobic conditioning, aqua therapy, other modalities including ultrasound, massage, and dry needling, epidural steroid injections and finally surgical intervention.       Pt presents with chronic low back pain. Will send PT orders to [place]. Pt will fu if pain persists.                       This service was not originating from a related E/M service provided within the previous 7 days nor will  to an E/M service or procedure within the next 24 hours or my soonest available appointment.  Prevailing standard of care was able to be met in this audio-only visit.

## 2023-11-09 ENCOUNTER — CLINICAL SUPPORT (OUTPATIENT)
Dept: REHABILITATION | Facility: HOSPITAL | Age: 23
End: 2023-11-09
Payer: COMMERCIAL

## 2023-11-09 DIAGNOSIS — M54.16 LUMBAR RADICULOPATHY, CHRONIC: ICD-10-CM

## 2023-11-09 PROCEDURE — 97110 THERAPEUTIC EXERCISES: CPT

## 2023-11-09 PROCEDURE — 97112 NEUROMUSCULAR REEDUCATION: CPT

## 2023-11-09 PROCEDURE — 97161 PT EVAL LOW COMPLEX 20 MIN: CPT
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
## 2023-11-14 ENCOUNTER — CLINICAL SUPPORT (OUTPATIENT)
Dept: REHABILITATION | Facility: HOSPITAL | Age: 23
End: 2023-11-14
Payer: COMMERCIAL

## 2023-11-14 DIAGNOSIS — M54.16 LUMBAR RADICULOPATHY, CHRONIC: Primary | ICD-10-CM

## 2023-11-14 PROCEDURE — 97140 MANUAL THERAPY 1/> REGIONS: CPT | Mod: CQ

## 2023-11-14 PROCEDURE — 97112 NEUROMUSCULAR REEDUCATION: CPT | Mod: CQ

## 2023-11-14 NOTE — PROGRESS NOTES
"[facility]   Physical Therapy Treatment Note     Name: Arian Farris  Clinic Number: [id number]    Therapy Diagnosis:   Encounter Diagnosis   Name Primary?    Lumbar radiculopathy, chronic Yes     Physician: Meg Canchola,*    Visit Date: 11/14/2023    FOTO 1st:   FOTO 3rd:  FOTO 10th:    Physician Orders: PT Eval and Treat   Medical Diagnosis from Referral: M54.16 (ICD-10-CM) - Lumbar radiculopathy, chronic  Evaluation Date: 11/9/2023  Authorization Period Expiration: 12/31/23  Plan of Care Expiration: 12/31/23  Visit # / Visits authorized: 2/ 20     Time In:  12:05 p  Time Out: 1:00 p  Total Billable Time: 55 minutes     Precautions: Standard      SUBJECTIVE     Pt reports: back is hurting. Walked a lot at [employer] game. HEP helps a little    He was compliant with home exercise program.  Response to previous treatment: none  Functional change: none    Pain: 3/10  Location: bilateral back      OBJECTIVE     Objective Measures updated at progress report unless specified.     Treatment       Arian received the treatments listed below:      Therapeutic exercises to develop strength, endurance, ROM, posture, and core stabilization for 05 minutes including:  DKTC with SB x 3 min    Manual therapy techniques: Joint mobilizations and Soft tissue Mobilization were applied to the: lumbar paraspinals for 08 minutes, including: plus set up  Cupping with hand heel rock x 3 min  Cupping with cat x 3 min    Therapeutic activities to improve functional performance for 00  minutes, including:  Lateral walks (next session)  Golf  step ups(next session)    Neuromuscular re-education activities to improve: Coordination, Kinesthetic, Proprioception, and Posture for 42 minutes. The following activities were included:  Segmental bridging 3x10  Side plank clams  YTB 10" hold x 10  Deadbug with SB 4x10  Pallof press GTB 30x   Sidelying hip abd against wall (next session)  Lat pull with " "contralateral step up 6" GTB 3x10    Patient Education and Home Exercises     Home Exercises Provided and Patient Education Provided     Education provided:   - Cont HEP    Written Home Exercises Provided: Patient instructed to cont prior HEP. Exercises were reviewed and Arian was able to demonstrate them prior to the end of the session.  Arian demonstrated good  understanding of the education provided. See EMR under Patient Instructions for exercises provided during therapy sessions    ASSESSMENT     Arian was challenged with segmental bridging which improved with cueing. Some reported LB symptoms with pallof press. Pt tends to hyperext lumbar during this intervention instead of engaging his core. Was corrected but had trouble maintaining. Glute fatigue noted after session    Arian Is progressing well towards his goals.     Pt prognosis is Good.     Pt will continue to benefit from skilled outpatient physical therapy to address the deficits listed in the problem list box on initial evaluation, provide pt/family education and to maximize pt's level of independence in the home and community environment.     Pt's spiritual, cultural and educational needs considered and pt agreeable to plan of care and goals.     Anticipated barriers to physical therapy: none    Goals:   Short Term Goals: 4 weeks   - Pt will increase ROM to 95 deg lumbar flexion painfree  - Pt will increase strength to 4/5 B hip abd and ext  - Decrease Pain to 2/10 as worst with all PT interventions  - Pt to self correct posture with minimal cues  - Pt independent with HEP with progressions.      Long Term Goals (8 Weeks):  - Pt will increase ROM to 100 deg lumbar flexion painfree  - Pt will increase strength to 5/5 B hip abd and ext  - Decrease Pain to 0/10 with all ADLs  - Pt to return to 90% PLOF    PLAN     Cont with POC to improve lumbar mobility and core/hip strength    Narcisa Lynch, PTA     "

## 2023-11-20 RX ORDER — DICLOFENAC SODIUM 75 MG/1
75 TABLET, DELAYED RELEASE ORAL 2 TIMES DAILY
Qty: 60 TABLET | Refills: 0 | OUTPATIENT
Start: 2023-11-20

## 2023-11-22 ENCOUNTER — PATIENT MESSAGE (OUTPATIENT)
Dept: REHABILITATION | Facility: HOSPITAL | Age: 23
End: 2023-11-22
Payer: COMMERCIAL

## 2023-11-29 ENCOUNTER — CLINICAL SUPPORT (OUTPATIENT)
Dept: REHABILITATION | Facility: HOSPITAL | Age: 23
End: 2023-11-29
Payer: COMMERCIAL

## 2023-11-29 DIAGNOSIS — M54.16 LUMBAR RADICULOPATHY, CHRONIC: Primary | ICD-10-CM

## 2023-11-29 PROCEDURE — 97112 NEUROMUSCULAR REEDUCATION: CPT | Mod: CQ

## 2023-11-29 PROCEDURE — 97110 THERAPEUTIC EXERCISES: CPT | Mod: CQ

## 2023-11-29 PROCEDURE — 97530 THERAPEUTIC ACTIVITIES: CPT | Mod: CQ

## 2023-11-29 NOTE — PROGRESS NOTES
"[facility]   Physical Therapy Treatment Note     Name: Arian Farris  Clinic Number: [id number]    Therapy Diagnosis:   Encounter Diagnosis   Name Primary?    Lumbar radiculopathy, chronic Yes     Physician: Meg Canchola,*    Visit Date: 11/29/2023    FOTO 1st:   FOTO 3rd:  FOTO 10th:    Physician Orders: PT Eval and Treat   Medical Diagnosis from Referral: M54.16 (ICD-10-CM) - Lumbar radiculopathy, chronic  Evaluation Date: 11/9/2023  Authorization Period Expiration: 12/31/23  Plan of Care Expiration: 12/31/23  Visit # / Visits authorized: 2/ 20     Time In:  12:40 p  Time Out: 1:50 p  Total Billable Time: 70 minutes     Precautions: Standard      SUBJECTIVE     Pt reports: R low back is sore    He was compliant with home exercise program.  Response to previous treatment: none  Functional change: none    Pain: 3/10  Location: bilateral back      OBJECTIVE     Objective Measures updated at progress report unless specified.     Treatment       Arian received the treatments listed below:      Therapeutic exercises to develop strength, endurance, ROM, posture, and core stabilization for 10 minutes including:    HHS B 2x1 min  QPED cat 30x    Manual therapy techniques: Joint mobilizations and Soft tissue Mobilization were applied to the: lumbar paraspinals for 05 minutes, including: plus set up  Cupping with hand heel rock x 5 min      Therapeutic activities to improve functional performance for 20  minutes, including:  Lateral walks YTB 2 laps full turf  Golf  step ups 12" 4x10 B    Neuromuscular re-education activities to improve: Coordination, Kinesthetic, Proprioception, and Posture for 35 minutes. The following activities were included:  Segmental bridging 3x10  Side plank clams  YTB 10" hold x 10  Deadbug with SB 4x10-np  Pallof press GTB 30x   Sidelying hip abd against wall 5x5 5" hold  Lat pull with contralateral step up 6" GTB 3x10-np    Patient Education and " Home Exercises     Home Exercises Provided and Patient Education Provided     Education provided:   - Cont HEP    Written Home Exercises Provided: Patient instructed to cont prior HEP. Exercises were reviewed and Arian was able to demonstrate them prior to the end of the session.  Arian demonstrated good  understanding of the education provided. See EMR under Patient Instructions for exercises provided during therapy sessions    ASSESSMENT     Decrease lumbar FL with QPED cat exercise. Glute med fatigue with hip abduction interventions. Anti rotation with gold  was challenging 2* strength. Started with band around ankles for lateral walks but had to modify resistance to around calf 2* to LE IR with movement.   Improved R LB symptoms upon departure.     Arian Is progressing well towards his goals.     Pt prognosis is Good.     Pt will continue to benefit from skilled outpatient physical therapy to address the deficits listed in the problem list box on initial evaluation, provide pt/family education and to maximize pt's level of independence in the home and community environment.     Pt's spiritual, cultural and educational needs considered and pt agreeable to plan of care and goals.     Anticipated barriers to physical therapy: none    Goals:   Short Term Goals: 4 weeks   - Pt will increase ROM to 95 deg lumbar flexion painfree  - Pt will increase strength to 4/5 B hip abd and ext  - Decrease Pain to 2/10 as worst with all PT interventions  - Pt to self correct posture with minimal cues  - Pt independent with HEP with progressions.      Long Term Goals (8 Weeks):  - Pt will increase ROM to 100 deg lumbar flexion painfree  - Pt will increase strength to 5/5 B hip abd and ext  - Decrease Pain to 0/10 with all ADLs  - Pt to return to 90% PLOF    PLAN     Cont with POC to improve lumbar mobility and core/hip strength    Narcisa Lynch, PTA

## 2023-12-06 ENCOUNTER — CLINICAL SUPPORT (OUTPATIENT)
Dept: REHABILITATION | Facility: HOSPITAL | Age: 23
End: 2023-12-06
Payer: COMMERCIAL

## 2023-12-06 DIAGNOSIS — M54.16 LUMBAR RADICULOPATHY, CHRONIC: Primary | ICD-10-CM

## 2023-12-06 PROCEDURE — 97530 THERAPEUTIC ACTIVITIES: CPT

## 2023-12-06 PROCEDURE — 97110 THERAPEUTIC EXERCISES: CPT

## 2023-12-06 NOTE — PROGRESS NOTES
"[facility]   Physical Therapy Treatment Note     Name: Arian Farris  Clinic Number: [id number]    Therapy Diagnosis:   Encounter Diagnosis   Name Primary?    Lumbar radiculopathy, chronic Yes     Physician: Meg Canchola,*    Visit Date: 12/6/2023    FOTO 1st:   FOTO 3rd:  FOTO 10th:    Physician Orders: PT Eval and Treat   Medical Diagnosis from Referral: M54.16 (ICD-10-CM) - Lumbar radiculopathy, chronic  Evaluation Date: 11/9/2023  Authorization Period Expiration: 12/31/23  Plan of Care Expiration: 12/31/23  Visit # / Visits authorized: 3/ 20     Time In:  1:40 p  Time Out: 235 p  Total Billable Time: 55 minutes     Precautions: Standard      SUBJECTIVE     Pt reports: continues to feel soreness in back.     He was compliant with home exercise program.  Response to previous treatment: none  Functional change: none    Pain: 3/10  Location: bilateral back      OBJECTIVE     Objective Measures updated at progress report unless specified.     Treatment       Arian received the treatments listed below:      Therapeutic exercises to develop strength, endurance, ROM, posture, and core stabilization for 10 minutes including:    HHR 2x10  Seated ball roll out x10 3 ways  QPED cat 30x-np    Manual therapy techniques: Joint mobilizations and Soft tissue Mobilization were applied to the: lumbar paraspinals for 00 minutes, including: plus set up  Cupping with hand heel rock x 5 min      Therapeutic activities to improve functional performance for 40  minutes, including:  Recumbent bike x8 min  Lateral walks GTB 2 laps full turf  STS with pole 3x10  Golf  step ups 12" 4x10 B    Neuromuscular re-education activities to improve: Coordination, Kinesthetic, Proprioception, and Posture for 5 minutes. The following activities were included:  Segmental bridging 3x10-np  Side plank clams  YTB 10" hold x 10-np  Deadbug with SB 2x10  Pallof press GTB 30x -np  Sidelying hip abd against " "wall 5x5 5" hold-np  Lat pull with contralateral step up 6" GTB 3x10-np        Patient Education and Home Exercises     Home Exercises Provided and Patient Education Provided     Education provided:   - Cont HEP    Written Home Exercises Provided: Patient instructed to cont prior HEP. Exercises were reviewed and Arian was able to demonstrate them prior to the end of the session.  Arian demonstrated good  understanding of the education provided. See EMR under Patient Instructions for exercises provided during therapy sessions    ASSESSMENT   Pt provided updated HEP to progress core stability interventions at home. Pt responded well to segmental mobility for lumbar spine and will add more QL lengthening next session. Plan to continue progressing as tolerated each session.     Arian Is progressing well towards his goals.     Pt prognosis is Good.     Pt will continue to benefit from skilled outpatient physical therapy to address the deficits listed in the problem list box on initial evaluation, provide pt/family education and to maximize pt's level of independence in the home and community environment.     Pt's spiritual, cultural and educational needs considered and pt agreeable to plan of care and goals.     Anticipated barriers to physical therapy: none    Goals:   Short Term Goals: 4 weeks   - Pt will increase ROM to 95 deg lumbar flexion painfree  - Pt will increase strength to 4/5 B hip abd and ext  - Decrease Pain to 2/10 as worst with all PT interventions  - Pt to self correct posture with minimal cues  - Pt independent with HEP with progressions.      Long Term Goals (8 Weeks):  - Pt will increase ROM to 100 deg lumbar flexion painfree  - Pt will increase strength to 5/5 B hip abd and ext  - Decrease Pain to 0/10 with all ADLs  - Pt to return to 90% PLOF    PLAN     Cont with POC to improve lumbar mobility and core/hip strength    Sherri Chavez, PT     "

## 2023-12-14 ENCOUNTER — CLINICAL SUPPORT (OUTPATIENT)
Dept: REHABILITATION | Facility: HOSPITAL | Age: 23
End: 2023-12-14
Payer: COMMERCIAL

## 2023-12-14 DIAGNOSIS — M54.16 LUMBAR RADICULOPATHY, CHRONIC: Primary | ICD-10-CM

## 2023-12-14 PROCEDURE — 97530 THERAPEUTIC ACTIVITIES: CPT | Mod: CQ

## 2023-12-14 PROCEDURE — 97110 THERAPEUTIC EXERCISES: CPT | Mod: CQ

## 2023-12-14 NOTE — PROGRESS NOTES
"[facility]   Physical Therapy Treatment Note     Name: Arian Farris  Clinic Number: [id number]    Therapy Diagnosis:   Encounter Diagnosis   Name Primary?    Lumbar radiculopathy, chronic Yes     Physician: Meg Canchola,*    Visit Date: 2023    FOTO 1st:   FOTO 3rd:  FOTO 10th:    Physician Orders: PT Eval and Treat   Medical Diagnosis from Referral: M54.16 (ICD-10-CM) - Lumbar radiculopathy, chronic  Evaluation Date: 2023  Authorization Period Expiration: 23  Plan of Care Expiration: 23  Visit # / Visits authorized:      Time In:  12:10 p  Time Out: 1:00 p  Total Billable Time: 50 minutes     Precautions: Standard      SUBJECTIVE     Pt reports: Was walking a lot so back is not bad. Hurts when sitting for prolong periods of time.     He was compliant with home exercise program.  Response to previous treatment: none  Functional change: none    Pain: 3/10  Location: bilateral back      OBJECTIVE     Objective Measures updated at progress report unless specified.     Treatment       Arian received the treatments listed below:      Therapeutic exercises to develop strength, endurance, ROM, posture, and core stabilization for 10 minutes includin/2 kneel QL stretch  HHR 2x10  Seated ball roll out x10 3 ways  QPED cat 30x-np    Manual therapy techniques: Joint mobilizations and Soft tissue Mobilization were applied to the: lumbar paraspinals for 00 minutes, including: plus set up  Cupping with hand heel rock x 5 min      Therapeutic activities to improve functional performance for 35  minutes, including:  Recumbent bike x8 min  Lateral walks GTB 2 laps full turf  STS with pole 3x10  Golf  step ups 12" 4x10 B    Neuromuscular re-education activities to improve: Coordination, Kinesthetic, Proprioception, and Posture for 5 minutes. The following activities were included:  Segmental bridging 3x10-np  Side plank clams  YTB 10" hold x " "10-np  Deadbug with SB 2x10  Pallof press GTB 30x -np  Sidelying hip abd against wall 5x5 5" hold-np  Lat pull with contralateral step up 6" GTB 3x10-np        Patient Education and Home Exercises     Home Exercises Provided and Patient Education Provided     Education provided:   - Cont HEP    Written Home Exercises Provided: Patient instructed to cont prior HEP. Exercises were reviewed and Arian was able to demonstrate them prior to the end of the session.  Arian demonstrated good  understanding of the education provided. See EMR under Patient Instructions for exercises provided during therapy sessions    ASSESSMENT   Arian tolerated interventions today without adverse reactions. Hip fatigue with lateral walks. Core challenged in the transverse plane with gold .     Arian Is progressing well towards his goals.     Pt prognosis is Good.     Pt will continue to benefit from skilled outpatient physical therapy to address the deficits listed in the problem list box on initial evaluation, provide pt/family education and to maximize pt's level of independence in the home and community environment.     Pt's spiritual, cultural and educational needs considered and pt agreeable to plan of care and goals.     Anticipated barriers to physical therapy: none    Goals:   Short Term Goals: 4 weeks   - Pt will increase ROM to 95 deg lumbar flexion painfree  - Pt will increase strength to 4/5 B hip abd and ext  - Decrease Pain to 2/10 as worst with all PT interventions  - Pt to self correct posture with minimal cues  - Pt independent with HEP with progressions.      Long Term Goals (8 Weeks):  - Pt will increase ROM to 100 deg lumbar flexion painfree  - Pt will increase strength to 5/5 B hip abd and ext  - Decrease Pain to 0/10 with all ADLs  - Pt to return to 90% PLOF    PLAN     Cont with POC to improve lumbar mobility and core/hip strength    Nacrisa Lynch, PTA     "

## 2023-12-19 ENCOUNTER — PATIENT MESSAGE (OUTPATIENT)
Dept: REHABILITATION | Facility: HOSPITAL | Age: 23
End: 2023-12-19
Payer: COMMERCIAL

## 2023-12-20 ENCOUNTER — CLINICAL SUPPORT (OUTPATIENT)
Dept: REHABILITATION | Facility: HOSPITAL | Age: 23
End: 2023-12-20
Payer: COMMERCIAL

## 2023-12-20 DIAGNOSIS — M54.16 LUMBAR RADICULOPATHY, CHRONIC: Primary | ICD-10-CM

## 2023-12-20 PROCEDURE — 97530 THERAPEUTIC ACTIVITIES: CPT | Mod: CQ

## 2023-12-20 PROCEDURE — 97112 NEUROMUSCULAR REEDUCATION: CPT | Mod: CQ

## 2023-12-20 PROCEDURE — 97110 THERAPEUTIC EXERCISES: CPT | Mod: CQ

## 2023-12-20 NOTE — PROGRESS NOTES
"[facility]   Physical Therapy Treatment Note     Name: Arian Farris  Clinic Number: [id number]    Therapy Diagnosis:   Encounter Diagnosis   Name Primary?    Lumbar radiculopathy, chronic Yes     Physician: Meg Canchola,*    Visit Date: 2023    FOTO 1st:   FOTO 3rd:  FOTO 10th:    Physician Orders: PT Eval and Treat   Medical Diagnosis from Referral: M54.16 (ICD-10-CM) - Lumbar radiculopathy, chronic  Evaluation Date: 2023  Authorization Period Expiration: 23  Plan of Care Expiration: 23  Visit # / Visits authorized:      Time In: 0900 a  Time Out: 1000 a  Total Billable Time: 60 minutes     Precautions: Standard      SUBJECTIVE     Pt reports: no change in LB    He was compliant with home exercise program.  Response to previous treatment: none  Functional change: none    Pain: 3/10  Location: bilateral back      OBJECTIVE     Objective Measures updated at progress report unless specified.     Treatment       Arian received the treatments listed below:      Therapeutic exercises to develop strength, endurance, ROM, posture, and core stabilization for 20 minutes includin/2 kneel QL stretch  HHR 2x10  Seated ball roll out x10 3 ways      Manual therapy techniques: Joint mobilizations and Soft tissue Mobilization were applied to the: lumbar paraspinals for 00 minutes, including: plus set up  Cupping with hand heel rock x 5 min      Therapeutic activities to improve functional performance for 10 minutes, including:  Recumbent bike x10 min  Lateral walks GTB 2 laps full turf  STS with pole 3x10-NP  Golf  step ups 12" 4x10 B-NP    Neuromuscular re-education activities to improve: Coordination, Kinesthetic, Proprioception, and Posture for 30 minutes. The following activities were included:  SL bridging w/ shoulder extension 3x10  Side plank clams  YTB 10" hold x 10-np  Deadbug with SB 2x10  Pallof press GTB 30x -np          Patient " Education and Home Exercises     Home Exercises Provided and Patient Education Provided     Education provided:   - Cont HEP    Written Home Exercises Provided: Patient instructed to cont prior HEP. Exercises were reviewed and Arian was able to demonstrate them prior to the end of the session.  Arian demonstrated good  understanding of the education provided. See EMR under Patient Instructions for exercises provided during therapy sessions    ASSESSMENT   Upon investigation with Arian he has seen improvement in therapy. States muscle soreness has gone away and now just experiences dull ache along lumbar spine. Will cont to work on core,hip and multifidus to improve spinal stability.     Arian Is progressing well towards his goals.     Pt prognosis is Good.     Pt will continue to benefit from skilled outpatient physical therapy to address the deficits listed in the problem list box on initial evaluation, provide pt/family education and to maximize pt's level of independence in the home and community environment.     Pt's spiritual, cultural and educational needs considered and pt agreeable to plan of care and goals.     Anticipated barriers to physical therapy: none    Goals:   Short Term Goals: 4 weeks   - Pt will increase ROM to 95 deg lumbar flexion painfree  - Pt will increase strength to 4/5 B hip abd and ext  - Decrease Pain to 2/10 as worst with all PT interventions  - Pt to self correct posture with minimal cues  - Pt independent with HEP with progressions.      Long Term Goals (8 Weeks):  - Pt will increase ROM to 100 deg lumbar flexion painfree  - Pt will increase strength to 5/5 B hip abd and ext  - Decrease Pain to 0/10 with all ADLs  - Pt to return to 90% PLOF    PLAN     Cont with POC to improve lumbar mobility and core/hip strength    Narcisa Lynch, PTA

## 2023-12-27 ENCOUNTER — CLINICAL SUPPORT (OUTPATIENT)
Dept: REHABILITATION | Facility: HOSPITAL | Age: 23
End: 2023-12-27
Payer: COMMERCIAL

## 2023-12-27 DIAGNOSIS — M54.16 LUMBAR RADICULOPATHY, CHRONIC: Primary | ICD-10-CM

## 2023-12-27 PROCEDURE — 97112 NEUROMUSCULAR REEDUCATION: CPT | Mod: CQ

## 2023-12-27 PROCEDURE — 97530 THERAPEUTIC ACTIVITIES: CPT | Mod: CQ

## 2023-12-27 PROCEDURE — 97110 THERAPEUTIC EXERCISES: CPT | Mod: CQ

## 2023-12-27 NOTE — PROGRESS NOTES
"[facility]   Physical Therapy Treatment Note     Name: Arian Farris  Clinic Number: [id number]    Therapy Diagnosis:   Encounter Diagnosis   Name Primary?    Lumbar radiculopathy, chronic Yes       Physician: Meg Canchola,*    Visit Date: 2023    FOTO 1st:   FOTO 3rd:  FOTO 10th:    Physician Orders: PT Eval and Treat   Medical Diagnosis from Referral: M54.16 (ICD-10-CM) - Lumbar radiculopathy, chronic  Evaluation Date: 2023  Authorization Period Expiration: 23  Plan of Care Expiration: 23  Visit # / Visits authorized:      Time In: 12:30 p  Time Out: 1:30 p  Total Billable Time: 60 minutes     Precautions: Standard      SUBJECTIVE     Pt reports: just sore. HS still feel tight    He was compliant with home exercise program.  Response to previous treatment: none  Functional change: none    Pain: 3/10  Location: bilateral back      OBJECTIVE     Objective Measures updated at progress report unless specified.     Treatment       Arian received the treatments listed below:      Therapeutic exercises to develop strength, endurance, ROM, posture, and core stabilization for 20 minutes includin/2 kneel QL stretch  HHR 2x10-np  Seated ball roll out x10 3 ways  Inch worms 1 lap  Toy soldiers 1 lap  Scoops 1 lap      Manual therapy techniques: Joint mobilizations and Soft tissue Mobilization were applied to the: lumbar paraspinals for 00 minutes, including: plus set up  Cupping with hand heel rock x 5 min      Therapeutic activities to improve functional performance for 25 minutes, including:  upright bike x10 min lvl 6  Lateral walks GTB 2 laps full turf    Super set: #15  Goblet squat  x 10  Wylie carry  swithcing sides 4 laps     Golf  step ups 12" 4x10 B-NP    Neuromuscular re-education activities to improve: Coordination, Kinesthetic, Proprioception, and Posture for 15 minutes. The following activities were included:  SL bridging w/ " "shoulder extension 3x10-nP  Side plank clams  YTB 10" hold x 10  Deadbug with SB 3x10  Pallof press GTB 30x -np          Patient Education and Home Exercises     Home Exercises Provided and Patient Education Provided     Education provided:   - Cont HEP    Written Home Exercises Provided: Patient instructed to cont prior HEP. Exercises were reviewed and Arian was able to demonstrate them prior to the end of the session.  Arian demonstrated good  understanding of the education provided. See EMR under Patient Instructions for exercises provided during therapy sessions    ASSESSMENT   Work on dynamic hamstring lengthening today. Added functional movement superset with good tolerance to work on core and hip strengthening and endurance. Will cont to progress as tolerate.     Arian Is progressing well towards his goals.     Pt prognosis is Good.     Pt will continue to benefit from skilled outpatient physical therapy to address the deficits listed in the problem list box on initial evaluation, provide pt/family education and to maximize pt's level of independence in the home and community environment.     Pt's spiritual, cultural and educational needs considered and pt agreeable to plan of care and goals.     Anticipated barriers to physical therapy: none    Goals:   Short Term Goals: 4 weeks   - Pt will increase ROM to 95 deg lumbar flexion painfree  - Pt will increase strength to 4/5 B hip abd and ext  - Decrease Pain to 2/10 as worst with all PT interventions  - Pt to self correct posture with minimal cues  - Pt independent with HEP with progressions.      Long Term Goals (8 Weeks):  - Pt will increase ROM to 100 deg lumbar flexion painfree  - Pt will increase strength to 5/5 B hip abd and ext  - Decrease Pain to 0/10 with all ADLs  - Pt to return to 90% PLOF    PLAN     Cont with POC to improve lumbar mobility and core/hip strength    Narcisa Lynch, PTA     "

## 2024-01-24 ENCOUNTER — CLINICAL SUPPORT (OUTPATIENT)
Dept: REHABILITATION | Facility: HOSPITAL | Age: 24
End: 2024-01-24
Payer: COMMERCIAL

## 2024-01-24 DIAGNOSIS — M54.16 LUMBAR RADICULOPATHY, CHRONIC: Primary | ICD-10-CM

## 2024-01-24 PROCEDURE — 97530 THERAPEUTIC ACTIVITIES: CPT

## 2024-01-24 PROCEDURE — 97110 THERAPEUTIC EXERCISES: CPT

## 2024-01-24 NOTE — PROGRESS NOTES
[facility]   Physical Therapy Treatment Note     Name: Arian Farris  Clinic Number: [id number]    Therapy Diagnosis:   Encounter Diagnosis   Name Primary?    Lumbar radiculopathy, chronic Yes       Physician: Meg Canchola,*    Visit Date: 1/24/2024    Physician Orders: PT Eval and Treat   Medical Diagnosis from Referral: M54.16 (ICD-10-CM) - Lumbar radiculopathy, chronic  Evaluation Date: 11/9/2023  Authorization Period Expiration: 12/31/24  Plan of Care Expiration: 1/24/24  Visit # / Visits authorized: 1/ 20     Time In: 12:30 p  Time Out: 1:20 p   Total Billable Time: 50 minutes     Precautions: Standard      SUBJECTIVE     Pt reports:overall feeling much better and just feels more muscle tension.     He was compliant with home exercise program.  Response to previous treatment: none  Functional change: none    Pain: 3/10  Location: bilateral back      OBJECTIVE   1/24/24  Lumbar Range of Motion:     Degrees Pain   Flexion 80              Extension 30              Left Side Bending WNL           Right Side Bending WNL           Left rotation    WNL           Right Rotation    WNL              Lower Extremity Strength  Right LE   Left LE     Knee extension: 5/5 Knee extension: 5/5   Knee flexion: 5/5 Knee flexion: 5/5   Hip flexion: 5/5 Hip flexion: 5/5   Hip extension:  5/5 Hip extension: 4+/5   Hip abduction: 5/5 Hip abduction: 5/5   Ankle dorsiflexion: 5/5 Ankle dorsiflexion: 5/5   Ankle plantarflexion: 5/5 Ankle plantarflexion: 5/5       Treatment     Arian received the treatments listed below:      Therapeutic exercises to develop strength, endurance, ROM, posture, and core stabilization for 40 minutes including:  HHR 2x10-np  Seated ball roll out x10 3 ways-np  Jonathan pose (front and B sides) x30 sec ea  Inch worms 1 lap  Toy soldiers 1 lap  Scoops 1 lap  PT reassessment  FOTO  Pt education and HEP review      Manual therapy techniques: Joint mobilizations and Soft  "tissue Mobilization were applied to the: lumbar paraspinals for 00 minutes, including: plus set up  Cupping with hand heel rock x 5 min      Therapeutic activities to improve functional performance for 10 minutes, including:  upright bike x10 min lvl 6  Lateral walks GTB 2 laps full turf-np    Super set: 00  Goblet squat  x 10  Wylie carry  swithcing sides 4 laps   Golf  step ups 12" 4x10 B-NP    Neuromuscular re-education activities to improve: Coordination, Kinesthetic, Proprioception, and Posture for 00 minutes. The following activities were included:  SL bridging w/ shoulder extension 3x10-nP  Side plank clams  YTB 10" hold x 10-np  Deadbug with SB 3x10-np  Pallof press GTB 30x -np          Patient Education and Home Exercises     Home Exercises Provided and Patient Education Provided     Education provided:   - Cont HEP    Written Home Exercises Provided: Patient instructed to cont prior HEP. Exercises were reviewed and Arian was able to demonstrate them prior to the end of the session.  Arian demonstrated good  understanding of the education provided. See EMR under Patient Instructions for exercises provided during therapy sessions    ASSESSMENT   Upon reassessment, pt demonstrated improvement with PT intervention to decrease back symptoms. He demonstrated a good understanding of HEP to maintain HS and lumbar mobility and instructed to perform daily. Pt encouraged to call or email with any additional questions regarding care.     Pt's spiritual, cultural and educational needs considered and pt agreeable to plan of care and goals.     Anticipated barriers to physical therapy: none    Goals:   Short Term Goals: 4 weeks   - Pt will increase ROM to 95 deg lumbar flexion painfree  - Pt will increase strength to 4/5 B hip abd and ext  - Decrease Pain to 2/10 as worst with all PT interventions  - Pt to self correct posture with minimal cues  - Pt independent with HEP with progressions.      Long Term " Goals (8 Weeks):  - Pt will increase ROM to 100 deg lumbar flexion painfree  - Pt will increase strength to 5/5 B hip abd and ext  - Decrease Pain to 0/10 with all ADLs  - Pt to return to 90% PLOF    PLAN   Discontinue PT.    Sherri Chavez, PT

## 2024-06-16 ENCOUNTER — HOSPITAL ENCOUNTER (EMERGENCY)
Facility: HOSPITAL | Age: 24
Discharge: HOME | End: 2024-06-16
Attending: STUDENT IN AN ORGANIZED HEALTH CARE EDUCATION/TRAINING PROGRAM
Payer: COMMERCIAL

## 2024-06-16 VITALS
DIASTOLIC BLOOD PRESSURE: 89 MMHG | HEART RATE: 88 BPM | TEMPERATURE: 97 F | RESPIRATION RATE: 16 BRPM | SYSTOLIC BLOOD PRESSURE: 159 MMHG | OXYGEN SATURATION: 100 %

## 2024-06-16 DIAGNOSIS — R11.15 CYCLIC VOMITING SYNDROME: ICD-10-CM

## 2024-06-16 DIAGNOSIS — R11.10 VOMITING, UNSPECIFIED VOMITING TYPE, UNSPECIFIED WHETHER NAUSEA PRESENT: Primary | ICD-10-CM

## 2024-06-16 LAB
ALBUMIN SERPL BCP-MCNC: 5.2 G/DL (ref 3.5–5.2)
ALP SERPL-CCNC: 74 U/L (ref 55–135)
ALT SERPL W/O P-5'-P-CCNC: 11 U/L (ref 10–44)
ANION GAP SERPL CALC-SCNC: 18 MMOL/L (ref 8–16)
AST SERPL-CCNC: 23 U/L (ref 10–40)
BASOPHILS # BLD AUTO: 0.03 K/UL (ref 0–0.2)
BASOPHILS NFR BLD: 0.4 % (ref 0–1.9)
BILIRUB SERPL-MCNC: 1.3 MG/DL (ref 0.1–1)
BUN SERPL-MCNC: 15 MG/DL (ref 6–20)
CALCIUM SERPL-MCNC: 10.5 MG/DL (ref 8.7–10.5)
CHLORIDE SERPL-SCNC: 103 MMOL/L (ref 95–110)
CO2 SERPL-SCNC: 18 MMOL/L (ref 23–29)
CREAT SERPL-MCNC: 1.2 MG/DL (ref 0.5–1.4)
CRP SERPL-MCNC: 0.1 MG/L (ref 0–8.2)
DIFFERENTIAL METHOD BLD: ABNORMAL
EOSINOPHIL # BLD AUTO: 0 K/UL (ref 0–0.5)
EOSINOPHIL NFR BLD: 0.4 % (ref 0–8)
ERYTHROCYTE [DISTWIDTH] IN BLOOD BY AUTOMATED COUNT: 12 % (ref 11.5–14.5)
EST. GFR  (NO RACE VARIABLE): >60 ML/MIN/1.73 M^2
GLUCOSE SERPL-MCNC: 167 MG/DL (ref 70–110)
HCT VFR BLD AUTO: 43.4 % (ref 40–54)
HGB BLD-MCNC: 16 G/DL (ref 14–18)
IMM GRANULOCYTES # BLD AUTO: 0.03 K/UL (ref 0–0.04)
IMM GRANULOCYTES NFR BLD AUTO: 0.4 % (ref 0–0.5)
LIPASE SERPL-CCNC: 11 U/L (ref 4–60)
LYMPHOCYTES # BLD AUTO: 0.9 K/UL (ref 1–4.8)
LYMPHOCYTES NFR BLD: 11.3 % (ref 18–48)
MCH RBC QN AUTO: 29.7 PG (ref 27–31)
MCHC RBC AUTO-ENTMCNC: 36.9 G/DL (ref 32–36)
MCV RBC AUTO: 81 FL (ref 82–98)
MONOCYTES # BLD AUTO: 0.4 K/UL (ref 0.3–1)
MONOCYTES NFR BLD: 4.8 % (ref 4–15)
NEUTROPHILS # BLD AUTO: 6.8 K/UL (ref 1.8–7.7)
NEUTROPHILS NFR BLD: 82.7 % (ref 38–73)
NRBC BLD-RTO: 0 /100 WBC
PLATELET # BLD AUTO: 166 K/UL (ref 150–450)
PMV BLD AUTO: 8.9 FL (ref 9.2–12.9)
POTASSIUM SERPL-SCNC: 4.1 MMOL/L (ref 3.5–5.1)
PROT SERPL-MCNC: 7.8 G/DL (ref 6–8.4)
RBC # BLD AUTO: 5.38 M/UL (ref 4.6–6.2)
SODIUM SERPL-SCNC: 139 MMOL/L (ref 136–145)
WBC # BLD AUTO: 8.15 K/UL (ref 3.9–12.7)

## 2024-06-16 PROCEDURE — 80053 COMPREHEN METABOLIC PANEL: CPT | Performed by: STUDENT IN AN ORGANIZED HEALTH CARE EDUCATION/TRAINING PROGRAM

## 2024-06-16 PROCEDURE — 96375 TX/PRO/DX INJ NEW DRUG ADDON: CPT

## 2024-06-16 PROCEDURE — 96372 THER/PROPH/DIAG INJ SC/IM: CPT | Mod: 59 | Performed by: STUDENT IN AN ORGANIZED HEALTH CARE EDUCATION/TRAINING PROGRAM

## 2024-06-16 PROCEDURE — 96374 THER/PROPH/DIAG INJ IV PUSH: CPT

## 2024-06-16 PROCEDURE — 63600175 PHARM REV CODE 636 W HCPCS: Performed by: STUDENT IN AN ORGANIZED HEALTH CARE EDUCATION/TRAINING PROGRAM

## 2024-06-16 PROCEDURE — 83690 ASSAY OF LIPASE: CPT | Performed by: STUDENT IN AN ORGANIZED HEALTH CARE EDUCATION/TRAINING PROGRAM

## 2024-06-16 PROCEDURE — 85025 COMPLETE CBC W/AUTO DIFF WBC: CPT | Performed by: STUDENT IN AN ORGANIZED HEALTH CARE EDUCATION/TRAINING PROGRAM

## 2024-06-16 PROCEDURE — 96361 HYDRATE IV INFUSION ADD-ON: CPT

## 2024-06-16 PROCEDURE — 86140 C-REACTIVE PROTEIN: CPT | Performed by: STUDENT IN AN ORGANIZED HEALTH CARE EDUCATION/TRAINING PROGRAM

## 2024-06-16 PROCEDURE — 99284 EMERGENCY DEPT VISIT MOD MDM: CPT | Mod: 25

## 2024-06-16 PROCEDURE — 25000003 PHARM REV CODE 250: Performed by: STUDENT IN AN ORGANIZED HEALTH CARE EDUCATION/TRAINING PROGRAM

## 2024-06-16 RX ORDER — LIDOCAINE HYDROCHLORIDE 20 MG/ML
15 SOLUTION OROPHARYNGEAL ONCE
Status: COMPLETED | OUTPATIENT
Start: 2024-06-16 | End: 2024-06-16

## 2024-06-16 RX ORDER — SODIUM CHLORIDE 9 MG/ML
1000 INJECTION, SOLUTION INTRAVENOUS
Status: COMPLETED | OUTPATIENT
Start: 2024-06-16 | End: 2024-06-16

## 2024-06-16 RX ORDER — ONDANSETRON 4 MG/1
4 TABLET, FILM COATED ORAL EVERY 6 HOURS
Qty: 12 TABLET | Refills: 0 | Status: SHIPPED | OUTPATIENT
Start: 2024-06-16

## 2024-06-16 RX ORDER — DIPHENHYDRAMINE HYDROCHLORIDE 50 MG/ML
50 INJECTION INTRAMUSCULAR; INTRAVENOUS
Status: COMPLETED | OUTPATIENT
Start: 2024-06-16 | End: 2024-06-16

## 2024-06-16 RX ORDER — LORAZEPAM 2 MG/ML
1 INJECTION INTRAMUSCULAR
Status: COMPLETED | OUTPATIENT
Start: 2024-06-16 | End: 2024-06-16

## 2024-06-16 RX ORDER — CALCIUM CARBONATE 200(500)MG
1000 TABLET,CHEWABLE ORAL
Status: COMPLETED | OUTPATIENT
Start: 2024-06-16 | End: 2024-06-16

## 2024-06-16 RX ORDER — ALUMINUM HYDROXIDE, MAGNESIUM HYDROXIDE, AND SIMETHICONE 1200; 120; 1200 MG/30ML; MG/30ML; MG/30ML
30 SUSPENSION ORAL ONCE
Status: COMPLETED | OUTPATIENT
Start: 2024-06-16 | End: 2024-06-16

## 2024-06-16 RX ORDER — HALOPERIDOL 5 MG/ML
5 INJECTION INTRAMUSCULAR
Status: COMPLETED | OUTPATIENT
Start: 2024-06-16 | End: 2024-06-16

## 2024-06-16 RX ORDER — PANTOPRAZOLE SODIUM 40 MG/1
40 TABLET, DELAYED RELEASE ORAL DAILY
Qty: 30 TABLET | Refills: 0 | Status: SHIPPED | OUTPATIENT
Start: 2024-06-16 | End: 2024-07-16

## 2024-06-16 RX ADMIN — DIPHENHYDRAMINE HYDROCHLORIDE 50 MG: 50 INJECTION, SOLUTION INTRAMUSCULAR; INTRAVENOUS at 09:06

## 2024-06-16 RX ADMIN — LORAZEPAM 1 MG: 2 INJECTION INTRAMUSCULAR; INTRAVENOUS at 09:06

## 2024-06-16 RX ADMIN — LIDOCAINE HYDROCHLORIDE 15 ML: 20 SOLUTION ORAL at 09:06

## 2024-06-16 RX ADMIN — HALOPERIDOL LACTATE 5 MG: 5 INJECTION, SOLUTION INTRAMUSCULAR at 09:06

## 2024-06-16 RX ADMIN — CALCIUM CARBONATE (ANTACID) CHEW TAB 500 MG 1000 MG: 500 CHEW TAB at 09:06

## 2024-06-16 RX ADMIN — ALUMINUM HYDROXIDE, MAGNESIUM HYDROXIDE, AND SIMETHICONE 30 ML: 1200; 120; 1200 SUSPENSION ORAL at 09:06

## 2024-06-16 RX ADMIN — SODIUM CHLORIDE 1000 ML: 9 INJECTION, SOLUTION INTRAVENOUS at 09:06

## 2024-06-16 NOTE — Clinical Note
"Arian Morfinyasmin Farris was seen and treated in our emergency department on 6/16/2024.  He may return to work on 06/18/2024.       If you have any questions or concerns, please don't hesitate to call.      Yoav Blancas MD"

## 2024-06-17 ENCOUNTER — PATIENT OUTREACH (OUTPATIENT)
Dept: EMERGENCY MEDICINE | Facility: HOSPITAL | Age: 24
End: 2024-06-17
Payer: COMMERCIAL

## 2024-06-17 NOTE — ED NOTES
Pt presents to ED with C/O with C/O N/V with onset at 1500 today. Pt states HX of cyclical vomiting for the past 15 years. Pt state he has been feeling faint and like he is going to pass out.

## 2024-06-17 NOTE — ED PROVIDER NOTES
Encounter Date: 6/16/2024         History     Chief Complaint   Patient presents with    Emesis     Patient presents to ED from home with c/o vomiting that began today at 1530 this afternoon. Patient reports a hx of cyclical vomiting for the last 15 years. Patient reports feeling faint, feeling like he will pass out. Patient appears pale and diaphoretic. Patient reports taking Prilosec, Tums, and Mylanta prior to arriving.      HPI  24-year-old male with history of cyclic vomiting since age 8 presents brought in by self through triage with mother for abdominal pain and vomiting.  Pain is gastric, and associated with several episodes of nonbilious nonbloody non coffee ground emesis since 3:00 p.m. today, which he reports is identical to previous exacerbations.  Review of patient's allergies indicates:  No Known Allergies  Past Medical History:   Diagnosis Date    Abdominal migraine     Cyclical vomiting with nausea     Eczema     H/O multiple concussions     Migraines      Past Surgical History:   Procedure Laterality Date    ADENOIDECTOMY      CHOLECYSTECTOMY  01/2018    ESOPHAGOGASTRODUODENOSCOPY N/A 05/04/2021    Procedure: ESOPHAGOGASTRODUODENOSCOPY (EGD);  Surgeon: Micheal Durant MD;  Location: [address]);  Service: Endoscopy;  Laterality: N/A;  schedule in 2 weeks if possible. can be with any provider if i'm not available  -dr macario  2/19/21-covid positive-inst portal-tb    TONSILLECTOMY       Family History   Problem Relation Name Age of Onset    Other Mother N/A         Hypoglycemia    Heart disease Mother N/A         Mitral valve prolapse    Migraines Mother N/A     Migraines Sister      Hypertension Maternal Grandmother Grandmother     Diabetes Maternal Grandmother Grandmother     Migraines Maternal Grandmother Grandmother     Eczema Maternal Grandmother Grandmother     Hypertension Maternal Grandfather N/A     Diabetes Maternal Grandfather N/A     Hypertension Paternal Grandmother N/A      Diabetes Paternal Grandmother N/A     Hypertension Paternal Grandfather N/A     Diabetes Paternal Grandfather N/A     Heart disease Paternal Grandfather N/A     Hyperlipidemia Paternal Grandfather N/A     Melanoma Father Father     Short stature Neg Hx      Thyroid disease Neg Hx      Colon cancer Neg Hx      Esophageal cancer Neg Hx       Social History     Tobacco Use    Smoking status: Never     Passive exposure: Yes    Smokeless tobacco: Never    Tobacco comments:     Dad smokes outside   Substance Use Topics    Alcohol use: Yes     Alcohol/week: 4.0 standard drinks of alcohol     Types: 4 Cans of beer per week     Comment: weekends    Drug use: No   , surgical, family, and social history reviewed and considered medical making  Review of Systems  Complete review of systems was conducted and was negative except as per HPI and as marked  Physical Exam     Initial Vitals [06/16/24 2001]   BP Pulse Resp Temp SpO2   (!) 140/81 79 18 96.7 °F (35.9 °C) 100 %      MAP       --         Physical Exam  Physical  General: Awake, alert, no acute distress  Head: Normocephalic, atraumatic  Neck: Trachea midline, full range of motion, no meningismus  ENT: Atraumatic, Airway Patent  Cardio: Regular Rate, Regular Rhythm, Capillary refill normal  Chest: Atraumatic, No respiratory distress no use of accessory muscles to breath, normal rate, sounds even and clear to auscultation  Upper Ext: Atraumatic, Inspection normal, no swelling  Lower Ext: Atraumatic, Inspection normal, no swelling  Neuro: No gross cranial nerve abnormality, no lateralization, no gross sensory or motor deficits  Abdomen: Soft, epigastric tenderness, no involuntary guarding, rigidity, or rebound.  ED Course   Procedures  Labs Reviewed   CBC W/ AUTO DIFFERENTIAL - Abnormal; Notable for the following components:       Result Value    MCV 81 (*)     MCHC 36.9 (*)     MPV 8.9 (*)     Lymph # 0.9 (*)     Gran % 82.7 (*)     Lymph % 11.3 (*)     All other components  within normal limits   COMPREHENSIVE METABOLIC PANEL - Abnormal; Notable for the following components:    CO2 18 (*)     Glucose 167 (*)     Total Bilirubin 1.3 (*)     Anion Gap 18 (*)     All other components within normal limits   C-REACTIVE PROTEIN   LIPASE   URINALYSIS, REFLEX TO URINE CULTURE          Imaging Results    None          Medications   haloperidol lactate injection 5 mg (5 mg Intramuscular Given 6/16/24 2120)   diphenhydrAMINE injection 50 mg (50 mg Intravenous Given 6/16/24 2120)   0.9%  NaCl infusion (0 mLs Intravenous Stopped 6/16/24 2224)   aluminum-magnesium hydroxide-simethicone 200-200-20 mg/5 mL suspension 30 mL (30 mLs Oral Given 6/16/24 2122)     And   LIDOcaine viscous HCl 2% oral solution 15 mL (15 mLs Oral Given 6/16/24 2122)   calcium carbonate 200 mg calcium (500 mg) chewable tablet 1,000 mg (1,000 mg Oral Given 6/16/24 2122)   LORazepam injection 1 mg (1 mg Intravenous Given 6/16/24 2132)     Medical Decision Making  Amount and/or Complexity of Data Reviewed  Labs: ordered.    Risk  OTC drugs.  Prescription drug management.                        24-year-old male with 16 year his cyclic vomiting hands for abdominal pain and vomiting.  Established with GI at [facility].  However has not had an exacerbation in several months or been to this ER in 2 years.  Will evaluate for acute pathology requiring intervention with appropriate studies, treat symptomatically, and reassess.  Patient is extremely anxious and self reports severe anxiety exacerbations that follow the vomiting, due to past trauma with a being very bad.    I reviewed the last time he was here in the ED which he received Haldol, and this worked for him.  The same medications were ordered today.  He was reassessed, and dramatically improved and able to tolerate p.o..  Ativan given x1 for anxiety.  He has not driving home.    History and exam is not consistent with any acute invasive intra-abdominal process, and likewise lab results  are not consistent with invasive intra-abdominal process such as appendicitis.  He is already post cholecystectomy.  Not consistent with biliary obstruction or bowel obstruction.  All studies reviewed, negative for acute pathology requiring intervention. Diagnosis, use of prescription medications, need for follow-up regarding visit today, need to call for follow-up, expected course, and return precautions were all discussed at length in my traditional manner to which patient verbalized understanding and agrees and all questions were answered. Patient is well appearing and ambulatory at time of discharge.              Clinical Impression:  Final diagnoses:  [R11.10] Vomiting, unspecified vomiting type, unspecified whether nausea present (Primary)          ED Disposition Condition    Discharge Stable          ED Prescriptions       Medication Sig Dispense Start Date End Date Auth. Provider    ondansetron (ZOFRAN) 4 MG tablet Take 1 tablet (4 mg total) by mouth every 6 (six) hours. 12 tablet 6/16/2024 -- Yoav Blancas MD    pantoprazole (PROTONIX) 40 MG tablet Take 1 tablet (40 mg total) by mouth once daily. 30 tablet 6/16/2024 7/16/2024 Yoav Blancas MD          Follow-up Information    None          Yoav Blancas MD  06/17/24 0036

## 2024-06-17 NOTE — PROGRESS NOTES
Patient was seen in the ED on 6/16/24 for vomiting. Patient was contacted for post ED discharge navigation. They have an appointment scheduled with Dr. Jeffrey Jewell on 7/10/24 at 3:30. ED navigator will remind patient of appointment.

## 2024-06-17 NOTE — DISCHARGE INSTRUCTIONS
Follow-up with Gastroenterology, either through your gastroenterologist in the [facility] system or through [facility].  Use as prescribed and return to the emergency department if condition worsens in any way.    Do not drive or operate machinery until at least 7:00 a.m. tomorrow morning

## 2024-06-17 NOTE — ED NOTES
Bed: EXAM 07  Expected date:   Expected time:   Means of arrival:   Comments:  Arian Farris when clean

## 2024-06-17 NOTE — ED TRIAGE NOTES
Arian Farris, a 24 y.o. male presents to the ED w/ complaint of emesis.  PT with hx of cyclic vomiting comes with multiple episodes of vomiting over the past few days.      Triage note:  Chief Complaint   Patient presents with    Emesis     Patient presents to ED from home with c/o vomiting that began today at 1530 this afternoon. Patient reports a hx of cyclical vomiting for the last 15 years. Patient reports feeling faint, feeling like he will pass out. Patient appears pale and diaphoretic. Patient reports taking Prilosec, Tums, and Mylanta prior to arriving.      Review of patient's allergies indicates:  No Known Allergies  Past Medical History:   Diagnosis Date    Abdominal migraine     Cyclical vomiting with nausea     Eczema     H/O multiple concussions     Migraines

## 2024-07-09 ENCOUNTER — PATIENT OUTREACH (OUTPATIENT)
Dept: EMERGENCY MEDICINE | Facility: HOSPITAL | Age: 24
End: 2024-07-09
Payer: COMMERCIAL

## 2024-07-10 ENCOUNTER — TELEPHONE (OUTPATIENT)
Dept: ENDOSCOPY | Facility: HOSPITAL | Age: 24
End: 2024-07-10
Payer: COMMERCIAL

## 2024-07-10 ENCOUNTER — OFFICE VISIT (OUTPATIENT)
Dept: GASTROENTEROLOGY | Facility: CLINIC | Age: 24
End: 2024-07-10
Payer: COMMERCIAL

## 2024-07-10 VITALS
WEIGHT: 159.81 LBS | SYSTOLIC BLOOD PRESSURE: 120 MMHG | HEIGHT: 72 IN | BODY MASS INDEX: 21.65 KG/M2 | HEART RATE: 84 BPM | DIASTOLIC BLOOD PRESSURE: 81 MMHG

## 2024-07-10 DIAGNOSIS — R11.10 VOMITING, UNSPECIFIED VOMITING TYPE, UNSPECIFIED WHETHER NAUSEA PRESENT: Primary | ICD-10-CM

## 2024-07-10 DIAGNOSIS — R13.10 DYSPHAGIA, UNSPECIFIED TYPE: ICD-10-CM

## 2024-07-10 DIAGNOSIS — R11.2 NAUSEA AND VOMITING, UNSPECIFIED VOMITING TYPE: ICD-10-CM

## 2024-07-10 DIAGNOSIS — R13.10 DYSPHAGIA, UNSPECIFIED TYPE: Primary | ICD-10-CM

## 2024-07-10 PROBLEM — K51.00 ULCERATIVE (CHRONIC) PANCOLITIS WITHOUT COMPLICATIONS: Status: ACTIVE | Noted: 2024-07-10

## 2024-07-10 PROCEDURE — 1159F MED LIST DOCD IN RCRD: CPT | Mod: CPTII,S$GLB,, | Performed by: INTERNAL MEDICINE

## 2024-07-10 PROCEDURE — 3074F SYST BP LT 130 MM HG: CPT | Mod: CPTII,S$GLB,, | Performed by: INTERNAL MEDICINE

## 2024-07-10 PROCEDURE — 99204 OFFICE O/P NEW MOD 45 MIN: CPT | Mod: S$GLB,,, | Performed by: INTERNAL MEDICINE

## 2024-07-10 PROCEDURE — 99999 PR PBB SHADOW E&M-EST. PATIENT-LVL IV: CPT | Mod: PBBFAC,,, | Performed by: INTERNAL MEDICINE

## 2024-07-10 PROCEDURE — 3079F DIAST BP 80-89 MM HG: CPT | Mod: CPTII,S$GLB,, | Performed by: INTERNAL MEDICINE

## 2024-07-10 PROCEDURE — 3008F BODY MASS INDEX DOCD: CPT | Mod: CPTII,S$GLB,, | Performed by: INTERNAL MEDICINE

## 2024-07-10 NOTE — PROGRESS NOTES
[facility] Consultation Note    Reason for Consult:  The primary encounter diagnosis was Vomiting, unspecified vomiting type, unspecified whether nausea present. A diagnosis of Dysphagia, unspecified type was also pertinent to this visit.    PCP:   SUSAN Jo   [address] / [place] [address]    Referring MD:  Yoav Blancas Md  [address] [address]    Initial History of Present Illness (HPI):  This is a 24 y.o. male here for evaluation of intermittent recurrent cyclic vomiting.  Patient says he has had symptoms for many years sometimes he can go a year between episodes sometimes he will have several episodes a month they can last for several hours nothing really that he can pinpoint triggers them nobody in the family has these symptoms he does not suffer from migraine headaches he does not throw up blood no diarrhea he does get some flushing and some palpitations feels very fatigued no family history of C1 esterase deficiency or abdominal migraines he has not doing illicit drugs like marijuana does not drink excessive alcohol does not smoke cigarettes no history of any abdominal trauma no weight loss he is leaving for new job next week in [employer] about a month ago he had 2 episodes of dysphagia for peanut butter sandwich he has had some heartburn has not been on a PPI or H2 blocker in 2 weeks will get lab work today will set him up for CT enterography in an EGD for further evaluation.  No family history of esophageal or stomach cancer nobody with small-bowel or colon cancer nobody with ovarian uterine kidney or bladder cancer.    Abdominal pain - as above  Reflux - as above  Dysphagia - as above  Bowel habits - normal  GI bleeding - none  NSAID usage - none    Interval HPI 07/10/2024:  The patient's last visit with me was on Visit date not found.      ROS:  Constitutional: No fevers, chills, No weight loss  ENT:  As above heartburn as above dysphagia no  odynophagia no hoarseness  CV: No chest pain, no palpitation  Pulm: No cough, No shortness of breath, no wheezing  Ophtho: No vision changes  GI: see HPI  Derm: No rash, no itching  Heme: No lymphadenopathy, No easy bruising  MSK: No significant arthritis  : No dysuria, No hematuria  Endo: No hot or cold intolerance  Neuro: No syncope, No seizure, no strokes  Psych: No uncontrolled anxiety, No uncontrolled depression    Medical History:  has a past medical history of Abdominal migraine, Cyclical vomiting with nausea, Eczema, H/O multiple concussions, and Migraines.    Surgical History:  has a past surgical history that includes Adenoidectomy; Tonsillectomy; Cholecystectomy (01/2018); and Esophagogastroduodenoscopy (N/A, 05/04/2021).    Family History: family history includes Diabetes in his maternal grandfather, maternal grandmother, paternal grandfather, and paternal grandmother; Eczema in his maternal grandmother; Heart disease in his mother and paternal grandfather; Hyperlipidemia in his paternal grandfather; Hypertension in his maternal grandfather, maternal grandmother, paternal grandfather, and paternal grandmother; Melanoma in his father; Migraines in his maternal grandmother, mother, and sister; Other in his mother..     Social History:  reports that he has never smoked. He has been exposed to tobacco smoke. He has never used smokeless tobacco. He reports current alcohol use of about 4.0 standard drinks of alcohol per week. He reports that he does not use drugs.    Review of patient's allergies indicates:  No Known Allergies    Medication List with Changes/Refills   Current Medications    DICLOFENAC (VOLTAREN) 75 MG EC TABLET    Take 1 tablet (75 mg total) by mouth 2 (two) times daily.    ONDANSETRON (ZOFRAN) 4 MG TABLET    Take 1 tablet (4 mg total) by mouth every 6 (six) hours.    PANTOPRAZOLE (PROTONIX) 40 MG TABLET    Take 1 tablet (40 mg total) by mouth once daily.         Objective Findings:    Vital  Signs:  /81   Pulse 84   Ht 6' (1.829 m)   Wt 72.5 kg (159 lb 13.3 oz)   BMI 21.68 kg/m²   Body mass index is 21.68 kg/m².    Physical Exam:  General Appearance: Well appearing in no acute distress  Eyes:    No scleral icterus  ENT:  No lesions or masses   Lungs: CTA bilaterally, no wheezes, no rhonchi, no rales  Heart:  S1, S2 normal, no murmurs heard  Abdomen:  Non distended, soft, no guarding, no rebound, no tenderness, no appreciated ascites, no bruits, no hepatosplenomegaly,  No CVA tenderness, no appreciated hernias, no Mata sign no McBurney point tenderness  Musculoskeletal:  No major joint deformities  Skin: No petechiae or rash on exposed skin areas  Neurologic:  Alert and oriented x4  Psychiatric:  Normal speech mentation and affect    Labs:  Lab Results   Component Value Date    WBC 8.15 06/16/2024    HGB 16.0 06/16/2024    HCT 43.4 06/16/2024     06/16/2024    CHOL 152 09/01/2022    TRIG 81 09/01/2022    HDL 58 09/01/2022    ALT 11 06/16/2024    AST 23 06/16/2024     06/16/2024    K 4.1 06/16/2024     06/16/2024    CREATININE 1.2 06/16/2024    BUN 15 06/16/2024    CO2 18 (L) 06/16/2024    TSH 1.308 05/16/2021    HGBA1C 4.6 09/01/2022         Medical Decision Making:  Lab work talk given  Lab work reviewed  EGD talk given  CT scan talk given    Assessment:  1. Vomiting, unspecified vomiting type, unspecified whether nausea present    2. Dysphagia, unspecified type         Recommendations:  1. Lab work  2. CT enterography  3. EGD for evaluation of dysphagia and nausea and vomiting  4. Return GI clinic telemedicine video visit 8 weeks for follow-up    No follow-ups on file.      Order summary:  Orders Placed This Encounter    CT Enterography Abd_Pelvis With Contrast    Calcitonin    Gastrin    Pancreastatin    Pancreatic Polypeptide    Somatostatin    Vasoactive Intes. Polypep 8150    5-HIAA Plasma (Neuroendocrine)    CATECHOLAMINES, FRACTIONATED, PLASMA    TRYPTASE    Lipase     Comprehensive Metabolic Panel    CBC Auto Differential    Celiac Disease Panel    Iron and TIBC    Ferritin    C1 ESTERASE INHIBITOR, FUNCTIONAL    C4 COMPLEMENT    C1 ESTERASE INHIBITOR PANEL         Thank you so much for allowing me to participate in the care of Arian Alonso Brenton Jewell MD    DISCLAIMER: This note was prepared with [employer] voice recognition transcription software. Garbled syntax, mangled or inadvertent pronouns, and other bizarre constructions may be attributed to that software system. While efforts were made to correct any mistakes made by this voice recognition program, some errors and/or omissions may remain in the note that were missed when the note was originally created.

## 2024-07-10 NOTE — Clinical Note
Lab work today Please schedule patient for CT enterography across street at the imaging center next available Please have patient see endoscopy schedulers today for urgent EGD Please schedule patient telemedicine video visit 8 weeks for follow-up

## 2024-07-10 NOTE — TELEPHONE ENCOUNTER
Spoke to pt to schedule procedure(s) Upper Endoscopy (EGD)       Physician to perform procedure(s) Dr. ALEXIA Jewell  Date of Procedure (s) 7/18/24  Arrival Time 12:05 PM  Time of Procedure(s) 1:05 PM   Location of Procedure(s) [address]  Type of Rx Prep sent to patient: N/A  Instructions provided to patient via [facility]/copy in hand    Patient was informed on the following information and verbalized understanding. Screening questionnaire reviewed with patient and complete. If procedure requires anesthesia, a responsible adult needs to be present to accompany the patient home, patient cannot drive after receiving anesthesia. Appointment details are tentative, especially check-in time. Patient will receive a prep-op call 7 days prior to confirm check-in time for procedure. If applicable the patient should contact their pharmacy to verify Rx for procedure prep is ready for pick-up. Patient was advised to call the scheduling department at [phone] if pharmacy states no Rx is available. Patient was advised to call the endoscopy scheduling department if any questions or concerns arise.      [facility] Scheduling Department

## 2024-07-10 NOTE — PROGRESS NOTES
GENERAL GI PATIENT INTAKE:    COVID symptoms in the last 7 days (runny nose, sore throat, congestion, cough, fever): No  PCP: SUSAN Jo  If not PCP-  number given to establish [phone]: N/A    ALLERGIES REVIEWED:  N/A    CHIEF COMPLAINT:    Chief Complaint   Patient presents with    Emesis       VITAL SIGNS:  /81   Pulse 84   Ht 6' (1.829 m)   Wt 72.5 kg (159 lb 13.3 oz)   BMI 21.68 kg/m²      Change in medical, surgical, family or social history: No      REVIEWED MEDICATION LIST RECONCILED INCLUDING ABOVE MEDS:  Yes

## 2024-07-10 NOTE — Clinical Note
"Procedure: EGD  Diagnosis: Dysphagia, intermittent nausea vomiting  Procedure Timing: Within 4 weeks (Urgent)  *If within 4 weeks selected, please nora as high priority*  *If greater than 12 weeks, please select "5-12 weeks" and delay sending until 3 months prior to requested date*  Location: Any Site  Additional Scheduling Information:  Please do next Thursday patient is leaving town to relocate to [place] for new job soon  Prep Specifications:Standard prep  Is the patient taking a GLP-1 Agonist:no  Have you attached a patient to this message: yes "

## 2024-07-10 NOTE — PATIENT INSTRUCTIONS
"Procedure: EGD    Diagnosis: Dysphagia, intermittent nausea vomiting    Procedure Timing: Within 4 weeks (Urgent)    *If within 4 weeks selected, please nora as high priority*    *If greater than 12 weeks, please select "5-12 weeks" and delay sending until 3 months prior to requested date*    Location: Any Site    Additional Scheduling Information:  Please do next Thursday patient is leaving town to relocate to [place] for new job soon    Prep Specifications:Standard prep    Is the patient taking a GLP-1 Agonist:no    Have you attached a patient to this message: yes   "

## 2024-07-10 NOTE — TELEPHONE ENCOUNTER
"----- Message from Nhung Ngo MA sent at 7/10/2024  4:39 PM CDT -----    ----- Message -----  From: Jeffrey Jewell MD  Sent: 7/10/2024   4:32 PM CDT  To: [facility]    Procedure: EGD    Diagnosis: Dysphagia, intermittent nausea vomiting    Procedure Timing: Within 4 weeks (Urgent)    #If within 4 weeks selected, please nora as high priority#    #If greater than 12 weeks, please select "5-12 weeks" and delay sending until 3 months prior to requested date#    Location: Any Site    Additional Scheduling Information:  Please do next Thursday patient is leaving [facility] to relocate to [place] for new job soon    Prep Specifications:Standard prep    Is the patient taking a GLP-1 Agonist:no    Have you attached a patient to this message: yes  "

## 2024-07-12 ENCOUNTER — TELEPHONE (OUTPATIENT)
Dept: ENDOSCOPY | Facility: HOSPITAL | Age: 24
End: 2024-07-12
Payer: COMMERCIAL

## 2024-07-12 NOTE — TELEPHONE ENCOUNTER
Contacted patient regarding new arrival time for EGD.    Spoke to pt to schedule procedure(s) Upper Endoscopy (EGD)       Physician to perform procedure(s) Dr. ALEXIA Jewell  Date of Procedure (s) 7/18/2024  Arrival Time 2:00 PM  Time of Procedure(s) 3:00 PM   Location of Procedure(s) [address]  Type of Rx Prep sent to patient: N/A  Instructions provided to patient via [facility]    Patient was informed on the following information and verbalized understanding. Screening questionnaire reviewed with patient and complete. If procedure requires anesthesia, a responsible adult needs to be present to accompany the patient home, patient cannot drive after receiving anesthesia. Appointment details are tentative, especially check-in time. Patient will receive a prep-op call 7 days prior to confirm check-in time for procedure. If applicable the patient should contact their pharmacy to verify Rx for procedure prep is ready for pick-up. Patient was advised to call the scheduling department at [phone] if pharmacy states no Rx is available. Patient was advised to call the endoscopy scheduling department if any questions or concerns arise.      [facility] Scheduling Department

## 2024-07-16 ENCOUNTER — TELEPHONE (OUTPATIENT)
Dept: GASTROENTEROLOGY | Facility: CLINIC | Age: 24
End: 2024-07-16
Payer: COMMERCIAL

## 2024-07-16 ENCOUNTER — PATIENT MESSAGE (OUTPATIENT)
Dept: GASTROENTEROLOGY | Facility: CLINIC | Age: 24
End: 2024-07-16
Payer: COMMERCIAL

## 2024-07-16 NOTE — TELEPHONE ENCOUNTER
----- Message from Jaimee Sy sent at 7/16/2024  4:45 PM CDT -----  Type:  Patient Returning Call    Who Called:Pt  Who Left Message for Patient:Ayleen  Does the patient know what this is regarding?:labs  Would the patient rather a call back or a response via [facility]? call  Best Call Back Number: [phone]  Additional Information: missed call would like another robyn back

## 2024-07-16 NOTE — TELEPHONE ENCOUNTER
----- Message from Yari Tatum sent at 7/16/2024  3:38 PM CDT -----  Regarding: Patient advice  Contact: Pt  Pt called in regards to getting lab orders faxed to [facility]         [facility] fax number [phone]  Phone number [phone]        Pt can be reached at [phone]

## 2024-07-17 ENCOUNTER — TELEPHONE (OUTPATIENT)
Dept: ENDOSCOPY | Facility: HOSPITAL | Age: 24
End: 2024-07-17
Payer: COMMERCIAL

## 2024-07-17 ENCOUNTER — LAB VISIT (OUTPATIENT)
Dept: LAB | Facility: HOSPITAL | Age: 24
End: 2024-07-17
Attending: INTERNAL MEDICINE
Payer: COMMERCIAL

## 2024-07-17 DIAGNOSIS — R11.10 VOMITING, UNSPECIFIED VOMITING TYPE, UNSPECIFIED WHETHER NAUSEA PRESENT: ICD-10-CM

## 2024-07-17 LAB
ALBUMIN SERPL BCP-MCNC: 4.4 G/DL (ref 3.5–5.2)
ALP SERPL-CCNC: 66 U/L (ref 55–135)
ALT SERPL W/O P-5'-P-CCNC: 12 U/L (ref 10–44)
ANION GAP SERPL CALC-SCNC: 9 MMOL/L (ref 8–16)
AST SERPL-CCNC: 16 U/L (ref 10–40)
BASOPHILS # BLD AUTO: 0.03 K/UL (ref 0–0.2)
BASOPHILS NFR BLD: 0.8 % (ref 0–1.9)
BILIRUB SERPL-MCNC: 1 MG/DL (ref 0.1–1)
BUN SERPL-MCNC: 16 MG/DL (ref 6–20)
C4 SERPL-MCNC: 13 MG/DL (ref 11–44)
CALCIUM SERPL-MCNC: 9.5 MG/DL (ref 8.7–10.5)
CHLORIDE SERPL-SCNC: 107 MMOL/L (ref 95–110)
CO2 SERPL-SCNC: 24 MMOL/L (ref 23–29)
CREAT SERPL-MCNC: 1.2 MG/DL (ref 0.5–1.4)
DIFFERENTIAL METHOD BLD: ABNORMAL
EOSINOPHIL # BLD AUTO: 0.3 K/UL (ref 0–0.5)
EOSINOPHIL NFR BLD: 6.6 % (ref 0–8)
ERYTHROCYTE [DISTWIDTH] IN BLOOD BY AUTOMATED COUNT: 12.2 % (ref 11.5–14.5)
EST. GFR  (NO RACE VARIABLE): >60 ML/MIN/1.73 M^2
FERRITIN SERPL-MCNC: 118 NG/ML (ref 20–300)
GLUCOSE SERPL-MCNC: 73 MG/DL (ref 70–110)
HCT VFR BLD AUTO: 43.7 % (ref 40–54)
HGB BLD-MCNC: 14.7 G/DL (ref 14–18)
IMM GRANULOCYTES # BLD AUTO: 0.01 K/UL (ref 0–0.04)
IMM GRANULOCYTES NFR BLD AUTO: 0.3 % (ref 0–0.5)
IRON SERPL-MCNC: 130 UG/DL (ref 45–160)
LIPASE SERPL-CCNC: 20 U/L (ref 4–60)
LYMPHOCYTES # BLD AUTO: 1.7 K/UL (ref 1–4.8)
LYMPHOCYTES NFR BLD: 45.7 % (ref 18–48)
MCH RBC QN AUTO: 28.5 PG (ref 27–31)
MCHC RBC AUTO-ENTMCNC: 33.6 G/DL (ref 32–36)
MCV RBC AUTO: 85 FL (ref 82–98)
MONOCYTES # BLD AUTO: 0.4 K/UL (ref 0.3–1)
MONOCYTES NFR BLD: 11.2 % (ref 4–15)
NEUTROPHILS # BLD AUTO: 1.3 K/UL (ref 1.8–7.7)
NEUTROPHILS NFR BLD: 35.4 % (ref 38–73)
NRBC BLD-RTO: 0 /100 WBC
PLATELET # BLD AUTO: 158 K/UL (ref 150–450)
PMV BLD AUTO: 9.4 FL (ref 9.2–12.9)
POTASSIUM SERPL-SCNC: 3.9 MMOL/L (ref 3.5–5.1)
PROT SERPL-MCNC: 6.8 G/DL (ref 6–8.4)
RBC # BLD AUTO: 5.15 M/UL (ref 4.6–6.2)
SATURATED IRON: 37 % (ref 20–50)
SODIUM SERPL-SCNC: 140 MMOL/L (ref 136–145)
TOTAL IRON BINDING CAPACITY: 349 UG/DL (ref 250–450)
TRANSFERRIN SERPL-MCNC: 236 MG/DL (ref 200–375)
WBC # BLD AUTO: 3.76 K/UL (ref 3.9–12.7)

## 2024-07-17 PROCEDURE — 83540 ASSAY OF IRON: CPT | Performed by: INTERNAL MEDICINE

## 2024-07-17 PROCEDURE — 82728 ASSAY OF FERRITIN: CPT | Performed by: INTERNAL MEDICINE

## 2024-07-17 PROCEDURE — 86161 COMPLEMENT/FUNCTION ACTIVITY: CPT | Performed by: INTERNAL MEDICINE

## 2024-07-17 PROCEDURE — 36415 COLL VENOUS BLD VENIPUNCTURE: CPT | Performed by: INTERNAL MEDICINE

## 2024-07-17 PROCEDURE — 83519 RIA NONANTIBODY: CPT | Performed by: INTERNAL MEDICINE

## 2024-07-17 PROCEDURE — 83690 ASSAY OF LIPASE: CPT | Performed by: INTERNAL MEDICINE

## 2024-07-17 PROCEDURE — 82308 ASSAY OF CALCITONIN: CPT | Performed by: INTERNAL MEDICINE

## 2024-07-17 PROCEDURE — 86258 DGP ANTIBODY EACH IG CLASS: CPT | Performed by: INTERNAL MEDICINE

## 2024-07-17 PROCEDURE — 85025 COMPLETE CBC W/AUTO DIFF WBC: CPT | Performed by: INTERNAL MEDICINE

## 2024-07-17 PROCEDURE — 83497 ASSAY OF 5-HIAA: CPT | Performed by: INTERNAL MEDICINE

## 2024-07-17 PROCEDURE — 82784 ASSAY IGA/IGD/IGG/IGM EACH: CPT | Performed by: INTERNAL MEDICINE

## 2024-07-17 PROCEDURE — 83520 IMMUNOASSAY QUANT NOS NONAB: CPT | Performed by: INTERNAL MEDICINE

## 2024-07-17 PROCEDURE — 86364 TISS TRNSGLTMNASE EA IG CLAS: CPT | Mod: 59 | Performed by: INTERNAL MEDICINE

## 2024-07-17 PROCEDURE — 86160 COMPLEMENT ANTIGEN: CPT | Mod: 59 | Performed by: INTERNAL MEDICINE

## 2024-07-17 PROCEDURE — 84307 ASSAY OF SOMATOSTATIN: CPT | Performed by: INTERNAL MEDICINE

## 2024-07-17 PROCEDURE — 83519 RIA NONANTIBODY: CPT | Mod: 59 | Performed by: INTERNAL MEDICINE

## 2024-07-17 PROCEDURE — 86160 COMPLEMENT ANTIGEN: CPT | Performed by: INTERNAL MEDICINE

## 2024-07-17 PROCEDURE — 82384 ASSAY THREE CATECHOLAMINES: CPT | Performed by: INTERNAL MEDICINE

## 2024-07-17 PROCEDURE — 84586 ASSAY OF VIP: CPT | Performed by: INTERNAL MEDICINE

## 2024-07-17 PROCEDURE — 82941 ASSAY OF GASTRIN: CPT | Performed by: INTERNAL MEDICINE

## 2024-07-17 PROCEDURE — 80053 COMPREHEN METABOLIC PANEL: CPT | Performed by: INTERNAL MEDICINE

## 2024-07-18 ENCOUNTER — ANESTHESIA EVENT (OUTPATIENT)
Dept: ENDOSCOPY | Facility: HOSPITAL | Age: 24
End: 2024-07-18
Payer: COMMERCIAL

## 2024-07-18 ENCOUNTER — ANESTHESIA (OUTPATIENT)
Dept: ENDOSCOPY | Facility: HOSPITAL | Age: 24
End: 2024-07-18
Payer: COMMERCIAL

## 2024-07-18 ENCOUNTER — HOSPITAL ENCOUNTER (OUTPATIENT)
Facility: HOSPITAL | Age: 24
Discharge: HOME | End: 2024-07-18
Attending: INTERNAL MEDICINE | Admitting: INTERNAL MEDICINE
Payer: COMMERCIAL

## 2024-07-18 ENCOUNTER — HOSPITAL ENCOUNTER (OUTPATIENT)
Dept: RADIOLOGY | Facility: HOSPITAL | Age: 24
Discharge: HOME | End: 2024-07-18
Attending: INTERNAL MEDICINE | Admitting: INTERNAL MEDICINE
Payer: COMMERCIAL

## 2024-07-18 VITALS
OXYGEN SATURATION: 98 % | HEART RATE: 88 BPM | DIASTOLIC BLOOD PRESSURE: 78 MMHG | WEIGHT: 159 LBS | BODY MASS INDEX: 21.54 KG/M2 | HEIGHT: 72 IN | RESPIRATION RATE: 16 BRPM | TEMPERATURE: 98 F | SYSTOLIC BLOOD PRESSURE: 132 MMHG

## 2024-07-18 DIAGNOSIS — R13.10 DYSPHAGIA: ICD-10-CM

## 2024-07-18 DIAGNOSIS — R13.10 DYSPHAGIA, UNSPECIFIED TYPE: ICD-10-CM

## 2024-07-18 DIAGNOSIS — K21.9 GASTROESOPHAGEAL REFLUX DISEASE, UNSPECIFIED WHETHER ESOPHAGITIS PRESENT: Primary | ICD-10-CM

## 2024-07-18 DIAGNOSIS — R11.10 VOMITING, UNSPECIFIED VOMITING TYPE, UNSPECIFIED WHETHER NAUSEA PRESENT: ICD-10-CM

## 2024-07-18 DIAGNOSIS — K44.9 HIATAL HERNIA: ICD-10-CM

## 2024-07-18 LAB
CALCIT SERPL-MCNC: <5 PG/ML
GASTRIN SERPL-MCNC: 17 PG/ML
TRYPTASE LEVEL: 4.8 NG/ML

## 2024-07-18 PROCEDURE — 88305 TISSUE EXAM BY PATHOLOGIST: CPT | Mod: 59 | Performed by: STUDENT IN AN ORGANIZED HEALTH CARE EDUCATION/TRAINING PROGRAM

## 2024-07-18 PROCEDURE — E9220 PRA ENDO ANESTHESIA: HCPCS | Mod: ,,, | Performed by: NURSE ANESTHETIST, CERTIFIED REGISTERED

## 2024-07-18 PROCEDURE — 37000009 HC ANESTHESIA EA ADD 15 MINS: Performed by: INTERNAL MEDICINE

## 2024-07-18 PROCEDURE — 37000008 HC ANESTHESIA 1ST 15 MINUTES: Performed by: INTERNAL MEDICINE

## 2024-07-18 PROCEDURE — 43239 EGD BIOPSY SINGLE/MULTIPLE: CPT | Mod: ,,, | Performed by: INTERNAL MEDICINE

## 2024-07-18 PROCEDURE — 74177 CT ABD & PELVIS W/CONTRAST: CPT | Mod: 26,,, | Performed by: RADIOLOGY

## 2024-07-18 PROCEDURE — 74177 CT ABD & PELVIS W/CONTRAST: CPT | Mod: TC

## 2024-07-18 PROCEDURE — 27201012 HC FORCEPS, HOT/COLD, DISP: Performed by: INTERNAL MEDICINE

## 2024-07-18 PROCEDURE — 88342 IMHCHEM/IMCYTCHM 1ST ANTB: CPT | Performed by: STUDENT IN AN ORGANIZED HEALTH CARE EDUCATION/TRAINING PROGRAM

## 2024-07-18 PROCEDURE — 43239 EGD BIOPSY SINGLE/MULTIPLE: CPT | Performed by: INTERNAL MEDICINE

## 2024-07-18 PROCEDURE — 63600175 PHARM REV CODE 636 W HCPCS: Performed by: NURSE ANESTHETIST, CERTIFIED REGISTERED

## 2024-07-18 PROCEDURE — 25000003 PHARM REV CODE 250: Performed by: NURSE ANESTHETIST, CERTIFIED REGISTERED

## 2024-07-18 PROCEDURE — 25000003 PHARM REV CODE 250: Performed by: INTERNAL MEDICINE

## 2024-07-18 RX ORDER — SODIUM CHLORIDE 9 MG/ML
INJECTION, SOLUTION INTRAVENOUS CONTINUOUS
Status: DISCONTINUED | OUTPATIENT
Start: 2024-07-18 | End: 2024-07-18 | Stop reason: HOSPADM

## 2024-07-18 RX ORDER — PROPOFOL 10 MG/ML
VIAL (ML) INTRAVENOUS
Status: DISCONTINUED | OUTPATIENT
Start: 2024-07-18 | End: 2024-07-18

## 2024-07-18 RX ORDER — LIDOCAINE HYDROCHLORIDE 20 MG/ML
INJECTION INTRAVENOUS
Status: DISCONTINUED | OUTPATIENT
Start: 2024-07-18 | End: 2024-07-18

## 2024-07-18 RX ORDER — DEXMEDETOMIDINE HYDROCHLORIDE 100 UG/ML
INJECTION, SOLUTION INTRAVENOUS
Status: DISCONTINUED | OUTPATIENT
Start: 2024-07-18 | End: 2024-07-18

## 2024-07-18 RX ORDER — PANTOPRAZOLE SODIUM 40 MG/1
40 TABLET, DELAYED RELEASE ORAL
Qty: 30 TABLET | Refills: 11 | Status: SHIPPED | OUTPATIENT
Start: 2024-07-18 | End: 2025-07-18

## 2024-07-18 RX ADMIN — DEXMEDETOMIDINE 8 MCG: 100 INJECTION, SOLUTION, CONCENTRATE INTRAVENOUS at 03:07

## 2024-07-18 RX ADMIN — SODIUM CHLORIDE: 0.9 INJECTION, SOLUTION INTRAVENOUS at 01:07

## 2024-07-18 RX ADMIN — LIDOCAINE HYDROCHLORIDE 80 MG: 20 INJECTION INTRAVENOUS at 03:07

## 2024-07-18 RX ADMIN — PROPOFOL 200 MCG/KG/MIN: 10 INJECTION, EMULSION INTRAVENOUS at 03:07

## 2024-07-18 NOTE — TRANSFER OF CARE
Anesthesia Transfer of Care Note    Patient: Arian Farris    Procedure(s) Performed: Procedure(s) (LRB):  EGD (ESOPHAGOGASTRODUODENOSCOPY) (N/A)    Patient location: PACU    Anesthesia Type: general    Transport from OR: Transported from OR on room air with adequate spontaneous ventilation    Post pain: adequate analgesia    Post assessment: no apparent anesthetic complications    Post vital signs: stable    Level of consciousness: awake    Nausea/Vomiting: no nausea/vomiting    Complications: none    Transfer of care protocol was followed    Last vitals: Visit Vitals  /85   Pulse 77   Temp 36.8 °C (98.2 °F)   Resp 16   Ht 6' (1.829 m)   Wt 72.1 kg (159 lb)   SpO2 99%   BMI 21.56 kg/m²

## 2024-07-18 NOTE — H&P
Short Stay Endoscopy History and Physical    PCP - SUSAN Jo MD  Referring Physician - Jeffrey Jewell MD  [address] [place] [address]    Procedure - esophagogastroduodenoscopy   ASA - per anesthesia  Mallampati - per anesthesia  History of Anesthesia problems - per anesthesia  Family history Anesthesia problems -  per anesthesia   Plan of anesthesia - per anesthesia    HPI:  This is a 24 y.o. male here for evaluation of: dysphagia, reflux, hx cyclic vomiting    Reflux - yes  Dysphagia - yes history, no currently  Abdominal pain - no  Diarrhea - no    ROS:  Constitutional: No fevers, chills, No weight loss  CV: No chest pain  Pulm: No cough, No shortness of breath  GI: see HPI      Medical History:  has a past medical history of Abdominal migraine, Cyclical vomiting with nausea, Eczema, H/O multiple concussions, and Migraines.    Surgical History:  has a past surgical history that includes Adenoidectomy; Tonsillectomy; Cholecystectomy (01/2018); and Esophagogastroduodenoscopy (N/A, 05/04/2021).    Family History: family history includes Diabetes in his maternal grandfather, maternal grandmother, paternal grandfather, and paternal grandmother; Eczema in his maternal grandmother; Heart disease in his mother and paternal grandfather; Hyperlipidemia in his paternal grandfather; Hypertension in his maternal grandfather, maternal grandmother, paternal grandfather, and paternal grandmother; Melanoma in his father; Migraines in his maternal grandmother, mother, and sister; Other in his mother..    Social History:  reports that he has never smoked. He has been exposed to tobacco smoke. He has never used smokeless tobacco. He reports current alcohol use of about 4.0 standard drinks of alcohol per week. He reports that he does not use drugs.    Review of patient's allergies indicates:  No Known Allergies    Medications:   Medications Prior to Admission   Medication Sig Dispense Refill Last Dose     diclofenac (VOLTAREN) 75 MG EC tablet Take 1 tablet (75 mg total) by mouth 2 (two) times daily. 60 tablet 0 Past Month    ondansetron (ZOFRAN) 4 MG tablet Take 1 tablet (4 mg total) by mouth every 6 (six) hours. 12 tablet 0 Past Month    pantoprazole (PROTONIX) 40 MG tablet Take 1 tablet (40 mg total) by mouth once daily. 30 tablet 0 Past Month       Physical Exam:    Vital Signs:   Vitals:    07/18/24 1317   BP: 131/85   Pulse: 77   Resp: 16   Temp: 98.2 °F (36.8 °C)       General Appearance: Well appearing in no acute distress    Labs:  Lab Results   Component Value Date    WBC 3.76 (L) 07/17/2024    HGB 14.7 07/17/2024    HCT 43.7 07/17/2024     07/17/2024    CHOL 152 09/01/2022    TRIG 81 09/01/2022    HDL 58 09/01/2022    ALT 12 07/17/2024    AST 16 07/17/2024     07/17/2024    K 3.9 07/17/2024     07/17/2024    CREATININE 1.2 07/17/2024    BUN 16 07/17/2024    CO2 24 07/17/2024    TSH 1.308 05/16/2021    HGBA1C 4.6 09/01/2022       I have explained the risks and benefits of this endoscopic procedure to the patient including but not limited to bleeding, inflammation, infection, perforation, missing a lesion and death.      Sowmya Benites MD

## 2024-07-18 NOTE — ANESTHESIA PREPROCEDURE EVALUATION
07/18/2024  Arian Farris is a 24 y.o., male. [facility]  Anesthesia Pre-Operative Evaluation       Patient Name: Arian Farris  YOB: 2000  MRN: [id number]  CSN: [id number]      Code Status: Prior   Date of Procedure: 7/18/2024  Anesthesia: Choice Procedure: Procedure(s) (LRB):  EGD (ESOPHAGOGASTRODUODENOSCOPY) (N/A)  Pre-Operative Diagnosis: Dysphagia, unspecified type [R13.10]  Nausea and vomiting, unspecified vomiting type [R11.2]  Proceduralist: Surgeons and Role:     * Jeffrey Jewell MD - Primary        SUBJECTIVE:   Arian Farris is a 24 y.o. male who  has a past medical history of Abdominal migraine, Cyclical vomiting with nausea, Eczema, H/O multiple concussions, and Migraines. No notes on file    Anticoagulants   Medication Route Frequency       he has a current medication list which includes the following long-term medication(s): pantoprazole.   ALLERGIES:   Review of patient's allergies indicates:  No Known Allergies  LDA:          Lines/Drains/Airways       Peripheral Intravenous Line  Duration                  Peripheral IV - Single Lumen 07/18/24 1320 20 G Anterior;Right Antecubital <1 day                  MEDICATIONS:     Antibiotics (From admission, onward)      None          VTE Risk Mitigation (From admission, onward)      None          Current Facility-Administered Medications   Medication Dose Route Frequency Provider Last Rate Last Admin    0.9%  NaCl infusion   Intravenous Continuous Jeffrey Jewell MD              History:   There are no hospital problems to display for this patient.    Surgical History:    has a past surgical history that includes Adenoidectomy; Tonsillectomy; Cholecystectomy (01/2018); and Esophagogastroduodenoscopy (N/A, 05/04/2021).   Social History:    reports being sexually active and has had partner(s) who  are female. He reports using the following method of birth control/protection: Condom.  reports that he has never smoked. He has been exposed to tobacco smoke. He has never used smokeless tobacco. He reports current alcohol use of about 4.0 standard drinks of alcohol per week. He reports that he does not use drugs.     OBJECTIVE:     Vital Signs (Most Recent):  Temp: 36.8 °C (98.2 °F) (07/18/24 1317)  Pulse: 77 (07/18/24 1317)  Resp: 16 (07/18/24 1317)  BP: 131/85 (07/18/24 1317)  SpO2: 99 % (07/18/24 1317) Vital Signs Range (Last 24H):  Temp:  [36.8 °C (98.2 °F)]   Pulse:  [77]   Resp:  [16]   BP: (131)/(85)   SpO2:  [99 %]        Body mass index is 21.56 kg/m².   Wt Readings from Last 4 Encounters:   07/18/24 72.1 kg (159 lb)   07/10/24 72.5 kg (159 lb 13.3 oz)   10/20/23 73.1 kg (161 lb 2.5 oz)   08/31/23 72.8 kg (160 lb 7.9 oz)     Significant Labs:  Lab Results   Component Value Date    WBC 3.76 (L) 07/17/2024    HGB 14.7 07/17/2024    HCT 43.7 07/17/2024     07/17/2024     07/17/2024    K 3.9 07/17/2024     07/17/2024    CREATININE 1.2 07/17/2024    BUN 16 07/17/2024    CO2 24 07/17/2024    GLU 73 07/17/2024    CALCIUM 9.5 07/17/2024    MG 1.9 10/20/2021    PHOS 3.5 09/01/2022    ALKPHOS 66 07/17/2024    ALT 12 07/17/2024    AST 16 07/17/2024    ALBUMIN 4.4 07/17/2024    HGBA1C 4.6 09/01/2022     No LMP for male patient.  Recent Results (from the past 72 hour(s))   Calcitonin    Collection Time: 07/17/24  7:00 AM   Result Value Ref Range    Calcitonin <5.0 <=14.3 pg/mL   Gastrin    Collection Time: 07/17/24  7:00 AM   Result Value Ref Range    Gastrin 17 <100 pg/mL   CBC Auto Differential    Collection Time: 07/17/24  7:00 AM   Result Value Ref Range    WBC 3.76 (L) 3.90 - 12.70 K/uL    RBC 5.15 4.60 - 6.20 M/uL    Hemoglobin 14.7 14.0 - 18.0 g/dL    Hematocrit 43.7 40.0 - 54.0 %    MCV 85 82 - 98 fL    MCH 28.5 27.0 - 31.0 pg    MCHC 33.6 32.0 - 36.0 g/dL    RDW 12.2 11.5 - 14.5 %     Platelets 158 150 - 450 K/uL    MPV 9.4 9.2 - 12.9 fL    Immature Granulocytes 0.3 0.0 - 0.5 %    Gran # (ANC) 1.3 (L) 1.8 - 7.7 K/uL    Immature Grans (Abs) 0.01 0.00 - 0.04 K/uL    Lymph # 1.7 1.0 - 4.8 K/uL    Mono # 0.4 0.3 - 1.0 K/uL    Eos # 0.3 0.0 - 0.5 K/uL    Baso # 0.03 0.00 - 0.20 K/uL    nRBC 0 0 /100 WBC    Gran % 35.4 (L) 38.0 - 73.0 %    Lymph % 45.7 18.0 - 48.0 %    Mono % 11.2 4.0 - 15.0 %    Eosinophil % 6.6 0.0 - 8.0 %    Basophil % 0.8 0.0 - 1.9 %    Differential Method Automated    Lipase    Collection Time: 07/17/24  7:33 AM   Result Value Ref Range    Lipase 20 4 - 60 U/L   Comprehensive Metabolic Panel    Collection Time: 07/17/24  7:33 AM   Result Value Ref Range    Sodium 140 136 - 145 mmol/L    Potassium 3.9 3.5 - 5.1 mmol/L    Chloride 107 95 - 110 mmol/L    CO2 24 23 - 29 mmol/L    Glucose 73 70 - 110 mg/dL    BUN 16 6 - 20 mg/dL    Creatinine 1.2 0.5 - 1.4 mg/dL    Calcium 9.5 8.7 - 10.5 mg/dL    Total Protein 6.8 6.0 - 8.4 g/dL    Albumin 4.4 3.5 - 5.2 g/dL    Total Bilirubin 1.0 0.1 - 1.0 mg/dL    Alkaline Phosphatase 66 55 - 135 U/L    AST 16 10 - 40 U/L    ALT 12 10 - 44 U/L    eGFR >60.0 >60 mL/min/1.73 m^2    Anion Gap 9 8 - 16 mmol/L   Iron and TIBC    Collection Time: 07/17/24  7:33 AM   Result Value Ref Range    Iron 130 45 - 160 ug/dL    Transferrin 236 200 - 375 mg/dL    TIBC 349 250 - 450 ug/dL    Saturated Iron 37 20 - 50 %   Ferritin    Collection Time: 07/17/24  7:33 AM   Result Value Ref Range    Ferritin 118 20.0 - 300.0 ng/mL   C4 COMPLEMENT    Collection Time: 07/17/24  7:33 AM   Result Value Ref Range    Complement (C-4) 13 11 - 44 mg/dL       EKG:   Results for orders placed or performed during the hospital encounter of 10/20/21   EKG 12-lead    Collection Time: 10/20/21  3:19 AM    Narrative    Test Reason : R10.9,    Vent. Rate : 079 BPM     Atrial Rate : 079 BPM     P-R Int : 120 ms          QRS Dur : 100 ms      QT Int : 368 ms       P-R-T Axes : 082 102 057  "degrees     QTc Int : 421 ms    Normal sinus rhythm with sinus arrhythmia  Baseline wander  Rightward axis  Nonspecific T wave abnormality  Abnormal ECG  When compared with ECG of 16-MAY-2021 11:05,  the axis has shifted rightward and there are now nonspecific T wave  abnormalities  Confirmed by Micheal Hidalgo MD ([id number]) on 10/20/2021 4:41:12 PM    Referred By: [email or web address]   SELF           Confirmed By:Micheal Hidalgo MD       TTE:  No results found for this or any previous visit.  No results found for: "EF"   No results found for this or any previous visit.  AURELIANO:  No results found for this or any previous visit.  Stress Test:  No results found for this or any previous visit.     LHC:  No results found for this or any previous visit.     PFT:  No results found for: "FEV1", "FVC", "WBX1KSA", "TLC", "DLCO"   ASSESSMENT/PLAN:          Pre-op Assessment    I have reviewed the Patient Summary Reports.    I have reviewed the NPO Status.   I have reviewed the Medications.     Review of Systems  Anesthesia Hx:   History of prior surgery of interest to airway management or planning:             Hematology/Oncology:  Hematology Normal   Oncology Normal                                   EENT/Dental:  EENT/Dental Normal           Cardiovascular:  Cardiovascular Normal                                            Pulmonary:  Pulmonary Normal                       Renal/:  Renal/ Normal                 Hepatic/GI:   PUD,     Cyclic vomiting hx          Musculoskeletal:  Musculoskeletal Normal                Neurological:    Neuromuscular Disease,  Headaches                                 Endocrine:  Endocrine Normal            Dermatological:  Skin Normal    Psych:  Psychiatric Normal                    Physical Exam  General: Well nourished    Airway:  Mallampati: II   Mouth Opening: Normal  TM Distance: Normal  Tongue: Normal  Neck ROM: Normal ROM    Dental:  Intact        Anesthesia Plan  Type of Anesthesia, risks & benefits " discussed:    Anesthesia Type: Gen ETT, Gen Natural Airway  Intra-op Monitoring Plan: Standard ASA Monitors  Post Op Pain Control Plan: multimodal analgesia  Induction:  IV  ASA Score: 2  Day of Surgery Review of History & Physical: H&P Update referred to the surgeon/provider.I have interviewed and examined the patient. I have reviewed the patient's H&P dated:     Ready For Surgery From Anesthesia Perspective.     .       Strong peripheral pulses

## 2024-07-18 NOTE — PLAN OF CARE
Arrives to room 6 via stretcher. Connected to cardiac monitor, CRNA to monitor, VS and administer medications. Patient denies NV or pain @ present

## 2024-07-18 NOTE — [employer]ATION PATIENT INSTRUCTIONS
Discharge Summary/Instructions after an Endoscopic Procedure  Patient Name: Arian Farris  Patient MRN: [id number]  Patient YOB: 2000  Thursday, July 18, 2024  Jeffrey Jewell MD  Dear patient,  As a result of recent federal legislation (The Federal Cures Act), you may   receive lab or pathology results from your procedure in your [facility]   account before your physician is able to contact you. Your physician or   their representative will relay the results to you with their   recommendations at their soonest availability.  Thank you,  RESTRICTIONS:  During your procedure today, you received medications for sedation.  These   medications may affect your judgment, balance and coordination.  Therefore,   for 24 hours, you have the following restrictions:   - DO NOT drive a car, operate machinery, make legal/financial decisions,   sign important papers or drink alcohol.    ACTIVITY:  Today: no heavy lifting, straining or running due to procedural   sedation/anesthesia.  The following day: return to full activity including work.  DIET:  Eat and drink normally unless instructed otherwise.     TREATMENT FOR COMMON SIDE EFFECTS:  - Mild abdominal pain, nausea, belching, bloating or excessive gas:  rest,   eat lightly and use a heating pad.  - Sore Throat: treat with throat lozenges and/or gargle with warm salt   water.  - Because air was used during the procedure, expelling large amounts of air   from your rectum or belching is normal.  - If a bowel prep was taken, you may not have a bowel movement for 1-3 days.    This is normal.  SYMPTOMS TO WATCH FOR AND REPORT TO YOUR PHYSICIAN:  1. Abdominal pain or bloating, other than gas cramps.  2. Chest pain.  3. Back pain.  4. Signs of infection such as: chills or fever occurring within 24 hours   after the procedure.  5. Rectal bleeding, which would show as bright red, maroon, or black stools.   (A tablespoon of blood from the rectum is not serious, especially if    hemorrhoids are present.)  6. Vomiting.  7. Weakness or dizziness.  GO DIRECTLY TO THE NEAREST EMERGENCY ROOM IF YOU HAVE ANY OF THE FOLLOWING:      Difficulty breathing              Chills and/or fever over 101 F   Persistent vomiting and/or vomiting blood   Severe abdominal pain   Severe chest pain   Black, tarry stools   Bleeding- more than one tablespoon   Any other symptom or condition that you feel may need urgent attention  Your doctor recommends these additional instructions:  If any biopsies were taken, your doctors clinic will contact you in 1 to 2   weeks with any results.  - Discharge patient to home.   - Follow an antireflux regimen indefinitely.   - Use Protonix 40 mg once daily (or any other full strength proton pump   inhibitor) - best taken 45 minutes before your first protein containing   meal.   - Await pathology results.   - Telephone endoscopist for pathology results in 3 weeks.   - Return to GI clinic at the next available appointment.   - Repeat upper endoscopy in 3 years for surveillance.   - The findings and recommendations were discussed with the patient.  For questions, problems or results please call your physician - Jeffrey Jewell MD at Work:  [phone].  [facility], EMERGENCY ROOM PHONE NUMBER: [phone]  IF A COMPLICATION OR EMERGENCY SITUATION ARISES AND YOU ARE UNABLE TO REACH   YOUR PHYSICIAN - GO DIRECTLY TO THE EMERGENCY ROOM.  Jeffrey Jewell MD  7/18/2024 3:36:22 PM  This report has been verified and signed electronically.  Dear patient,  As a result of recent federal legislation (The Federal Cures Act), you may   receive lab or pathology results from your procedure in your [facility]   account before your physician is able to contact you. Your physician or   their representative will relay the results to you with their   recommendations at their soonest availability.  Thank you,  [employer]ATION

## 2024-07-19 LAB
5OH-INDOLEACETATE SERPL-MCNC: 11 NG/ML
GLIADIN PEPTIDE IGA SER-ACNC: 0.3 U/ML
GLIADIN PEPTIDE IGG SER-ACNC: <0.6 U/ML
IGA SERPL-MCNC: 55 MG/DL (ref 70–400)
TTG IGA SER-ACNC: <0.2 U/ML
TTG IGG SER-ACNC: <0.6 U/ML

## 2024-07-19 NOTE — ANESTHESIA POSTPROCEDURE EVALUATION
Anesthesia Post Evaluation    Patient: Arian Farris    Procedure(s) Performed: Procedure(s) (LRB):  EGD (ESOPHAGOGASTRODUODENOSCOPY) (N/A)    Final Anesthesia Type: general      Patient location during evaluation: PACU  Patient participation: Yes- Able to Participate  Level of consciousness: awake and alert  Post-procedure vital signs: reviewed and stable  Pain management: adequate  Airway patency: patent    PONV status at discharge: No PONV  Anesthetic complications: no      Cardiovascular status: blood pressure returned to baseline  Respiratory status: unassisted, room air and spontaneous ventilation  Hydration status: euvolemic  Follow-up not needed.              Vitals Value Taken Time   /78 07/18/24 1607   Temp 36.7 °C (98 °F) 07/18/24 1542   Pulse 88 07/18/24 1607   Resp 16 07/18/24 1607   SpO2 98 % 07/18/24 1607         Event Time   Out of Recovery 16:14:22         Pain/Carlos Score: Carlos Score: 10 (7/18/2024  3:43 PM)

## 2024-07-21 ENCOUNTER — PATIENT MESSAGE (OUTPATIENT)
Dept: GASTROENTEROLOGY | Facility: CLINIC | Age: 24
End: 2024-07-21
Payer: COMMERCIAL

## 2024-07-21 DIAGNOSIS — N28.1 RENAL CYST: Primary | ICD-10-CM

## 2024-07-21 DIAGNOSIS — N20.0 RENAL STONES: ICD-10-CM

## 2024-07-21 NOTE — PROGRESS NOTES
Argenis please refer Arian to Urology for evaluation of his renal cyst and renal stone referral placed    Arian CT scan shows incidental renal cyst and possible small nonobstructing left kidney stone I will refer you to urology for follow-up evaluation      Impression:    No evidence to suggest inflammatory bowel disease as clinically questioned.    Nonobstructive punctate left kidney stone.    There is a simple cyst in the upper left kidney measuring 3.6 cm, slightly increased in size from prior (previously 2.6 cm).  Few additional subcentimeter hypodensities bilaterally which are too small for characterization.  No solid mass lesions.  Nonobstructive 2 mm renal calculus in the upper left kidney.  No hydronephrosis.      Electronically signed by:Juan Pablo Watson Jr  Date:                                            07/19/2024

## 2024-07-23 LAB
C1INH FUNCTIONAL/C1INH TOTAL MFR SERPL: >100 %
FINAL PATHOLOGIC DIAGNOSIS: NORMAL
GROSS: NORMAL
Lab: NORMAL
MICROSCOPIC EXAM: NORMAL
PANCREASTATIN SERPL-MCNC: 46 PG/ML (ref 10–135)

## 2024-07-24 LAB
C1INH SERPL-MCNC: 27 MG/DL (ref 21–39)
PANC POLYPEPT SERPL-MCNC: 83 PG/ML
VASOACTIVE INTESTINAL POLYPEPTIDE PLASMA: 17 PG/ML

## 2024-07-25 NOTE — TELEPHONE ENCOUNTER
Please schedule him for an annual wellness visit at his convenience so that we can catch up with one another.

## 2024-08-04 ENCOUNTER — PATIENT MESSAGE (OUTPATIENT)
Dept: GASTROENTEROLOGY | Facility: CLINIC | Age: 24
End: 2024-08-04
Payer: COMMERCIAL

## 2024-08-04 DIAGNOSIS — R10.9 ABDOMINAL PAIN, UNSPECIFIED ABDOMINAL LOCATION: Primary | ICD-10-CM

## 2024-08-04 DIAGNOSIS — R11.2 NAUSEA AND VOMITING, UNSPECIFIED VOMITING TYPE: ICD-10-CM

## 2024-08-04 RX ORDER — ONDANSETRON 4 MG/1
4 TABLET, ORALLY DISINTEGRATING ORAL EVERY 12 HOURS PRN
Qty: 30 TABLET | Refills: 0 | Status: SHIPPED | OUTPATIENT
Start: 2024-08-04

## 2024-08-05 ENCOUNTER — OFFICE VISIT (OUTPATIENT)
Dept: UROLOGY | Facility: CLINIC | Age: 24
End: 2024-08-05
Payer: COMMERCIAL

## 2024-08-05 VITALS
TEMPERATURE: 97 F | HEART RATE: 68 BPM | WEIGHT: 156.63 LBS | RESPIRATION RATE: 18 BRPM | DIASTOLIC BLOOD PRESSURE: 85 MMHG | SYSTOLIC BLOOD PRESSURE: 125 MMHG | BODY MASS INDEX: 21.22 KG/M2 | HEIGHT: 72 IN

## 2024-08-05 DIAGNOSIS — N20.0 RENAL STONES: ICD-10-CM

## 2024-08-05 DIAGNOSIS — N28.1 RENAL CYST: Primary | ICD-10-CM

## 2024-08-05 LAB
BILIRUB UR QL STRIP: NEGATIVE
GLUCOSE UR QL STRIP: NEGATIVE
KETONES UR QL STRIP: NEGATIVE
LEUKOCYTE ESTERASE UR QL STRIP: NEGATIVE
PH, POC UA: 6.5
POC BLOOD, URINE: NEGATIVE
POC NITRATES, URINE: NEGATIVE
PROT UR QL STRIP: NEGATIVE
SP GR UR STRIP: 1.02 (ref 1–1.03)
UROBILINOGEN UR STRIP-ACNC: 0.2 (ref 0.3–2.2)

## 2024-08-05 PROCEDURE — 3074F SYST BP LT 130 MM HG: CPT | Mod: CPTII,S$GLB,, | Performed by: UROLOGY

## 2024-08-05 PROCEDURE — 81003 URINALYSIS AUTO W/O SCOPE: CPT | Mod: QW,S$GLB,, | Performed by: UROLOGY

## 2024-08-05 PROCEDURE — 1159F MED LIST DOCD IN RCRD: CPT | Mod: CPTII,S$GLB,, | Performed by: UROLOGY

## 2024-08-05 PROCEDURE — 3079F DIAST BP 80-89 MM HG: CPT | Mod: CPTII,S$GLB,, | Performed by: UROLOGY

## 2024-08-05 PROCEDURE — 3008F BODY MASS INDEX DOCD: CPT | Mod: CPTII,S$GLB,, | Performed by: UROLOGY

## 2024-08-05 PROCEDURE — 99204 OFFICE O/P NEW MOD 45 MIN: CPT | Mod: S$GLB,,, | Performed by: UROLOGY

## 2024-08-05 PROCEDURE — 99999 PR PBB SHADOW E&M-EST. PATIENT-LVL III: CPT | Mod: PBBFAC,,, | Performed by: UROLOGY

## 2024-08-07 ENCOUNTER — LAB VISIT (OUTPATIENT)
Dept: LAB | Facility: HOSPITAL | Age: 24
End: 2024-08-07
Attending: INTERNAL MEDICINE
Payer: COMMERCIAL

## 2024-08-07 DIAGNOSIS — R10.9 ABDOMINAL PAIN, UNSPECIFIED ABDOMINAL LOCATION: ICD-10-CM

## 2024-08-07 DIAGNOSIS — R11.2 NAUSEA AND VOMITING, UNSPECIFIED VOMITING TYPE: ICD-10-CM

## 2024-08-07 LAB
ALBUMIN SERPL BCP-MCNC: 4.5 G/DL (ref 3.5–5.2)
ALP SERPL-CCNC: 63 U/L (ref 55–135)
ALT SERPL W/O P-5'-P-CCNC: 8 U/L (ref 10–44)
ANION GAP SERPL CALC-SCNC: 6 MMOL/L (ref 8–16)
AST SERPL-CCNC: 13 U/L (ref 10–40)
BASOPHILS # BLD AUTO: 0.03 K/UL (ref 0–0.2)
BASOPHILS NFR BLD: 0.9 % (ref 0–1.9)
BILIRUB SERPL-MCNC: 1.1 MG/DL (ref 0.1–1)
BUN SERPL-MCNC: 10 MG/DL (ref 6–20)
CALCIUM SERPL-MCNC: 9.9 MG/DL (ref 8.7–10.5)
CHLORIDE SERPL-SCNC: 109 MMOL/L (ref 95–110)
CO2 SERPL-SCNC: 25 MMOL/L (ref 23–29)
CREAT SERPL-MCNC: 1.2 MG/DL (ref 0.5–1.4)
DIFFERENTIAL METHOD BLD: ABNORMAL
EOSINOPHIL # BLD AUTO: 0.1 K/UL (ref 0–0.5)
EOSINOPHIL NFR BLD: 4.1 % (ref 0–8)
ERYTHROCYTE [DISTWIDTH] IN BLOOD BY AUTOMATED COUNT: 12.2 % (ref 11.5–14.5)
EST. GFR  (NO RACE VARIABLE): >60 ML/MIN/1.73 M^2
GLUCOSE SERPL-MCNC: 83 MG/DL (ref 70–110)
HCT VFR BLD AUTO: 41.4 % (ref 40–54)
HGB BLD-MCNC: 14.4 G/DL (ref 14–18)
IMM GRANULOCYTES # BLD AUTO: 0.01 K/UL (ref 0–0.04)
IMM GRANULOCYTES NFR BLD AUTO: 0.3 % (ref 0–0.5)
LIPASE SERPL-CCNC: 24 U/L (ref 4–60)
LYMPHOCYTES # BLD AUTO: 1.4 K/UL (ref 1–4.8)
LYMPHOCYTES NFR BLD: 39.1 % (ref 18–48)
MCH RBC QN AUTO: 28.9 PG (ref 27–31)
MCHC RBC AUTO-ENTMCNC: 34.8 G/DL (ref 32–36)
MCV RBC AUTO: 83 FL (ref 82–98)
MONOCYTES # BLD AUTO: 0.4 K/UL (ref 0.3–1)
MONOCYTES NFR BLD: 12.5 % (ref 4–15)
NEUTROPHILS # BLD AUTO: 1.5 K/UL (ref 1.8–7.7)
NEUTROPHILS NFR BLD: 43.1 % (ref 38–73)
NRBC BLD-RTO: 0 /100 WBC
PLATELET # BLD AUTO: 144 K/UL (ref 150–450)
PMV BLD AUTO: 9.4 FL (ref 9.2–12.9)
POTASSIUM SERPL-SCNC: 3.9 MMOL/L (ref 3.5–5.1)
PROT SERPL-MCNC: 6.7 G/DL (ref 6–8.4)
RBC # BLD AUTO: 4.99 M/UL (ref 4.6–6.2)
SODIUM SERPL-SCNC: 140 MMOL/L (ref 136–145)
TSH SERPL DL<=0.005 MIU/L-ACNC: 0.64 UIU/ML (ref 0.4–4)
WBC # BLD AUTO: 3.45 K/UL (ref 3.9–12.7)

## 2024-08-07 PROCEDURE — 82657 ENZYME CELL ACTIVITY: CPT | Performed by: INTERNAL MEDICINE

## 2024-08-07 PROCEDURE — 84443 ASSAY THYROID STIM HORMONE: CPT | Performed by: INTERNAL MEDICINE

## 2024-08-07 PROCEDURE — 36415 COLL VENOUS BLD VENIPUNCTURE: CPT | Performed by: INTERNAL MEDICINE

## 2024-08-07 PROCEDURE — 83690 ASSAY OF LIPASE: CPT | Performed by: INTERNAL MEDICINE

## 2024-08-07 PROCEDURE — 85025 COMPLETE CBC W/AUTO DIFF WBC: CPT | Performed by: INTERNAL MEDICINE

## 2024-08-07 PROCEDURE — 84311 SPECTROPHOTOMETRY: CPT | Performed by: INTERNAL MEDICINE

## 2024-08-07 PROCEDURE — 80053 COMPREHEN METABOLIC PANEL: CPT | Performed by: INTERNAL MEDICINE

## 2024-08-08 LAB
CLINICAL BIOCHEMIST REVIEW: NORMAL
PORPHYRINS REVIEWED BY: NORMAL
PORPHYRINS SERPL-MCNC: <1 MCG/DL

## 2024-08-13 ENCOUNTER — PATIENT MESSAGE (OUTPATIENT)
Dept: GASTROENTEROLOGY | Facility: CLINIC | Age: 24
End: 2024-08-13
Payer: COMMERCIAL

## 2024-08-13 DIAGNOSIS — R89.9 ABNORMAL LABORATORY TEST RESULT: Primary | ICD-10-CM

## 2024-08-13 LAB
CLINICAL BIOCHEMIST REVIEW: NORMAL
PBG DEAMINASE RBC-CCNC: 11.7 NMOL/L/SEC
PROVIDER SIGNING NAME: NORMAL

## 2024-08-14 ENCOUNTER — PATIENT MESSAGE (OUTPATIENT)
Dept: GASTROENTEROLOGY | Facility: CLINIC | Age: 24
End: 2024-08-14
Payer: COMMERCIAL

## 2024-09-12 ENCOUNTER — PATIENT MESSAGE (OUTPATIENT)
Dept: GASTROENTEROLOGY | Facility: CLINIC | Age: 24
End: 2024-09-12
Payer: COMMERCIAL

## 2024-09-12 ENCOUNTER — LAB VISIT (OUTPATIENT)
Dept: LAB | Facility: HOSPITAL | Age: 24
End: 2024-09-12
Attending: INTERNAL MEDICINE
Payer: COMMERCIAL

## 2024-09-12 DIAGNOSIS — D69.6 THROMBOCYTOPENIA: Primary | ICD-10-CM

## 2024-09-12 DIAGNOSIS — R89.9 ABNORMAL LABORATORY TEST RESULT: ICD-10-CM

## 2024-09-12 LAB
ALBUMIN SERPL BCP-MCNC: 4.6 G/DL (ref 3.5–5.2)
ALP SERPL-CCNC: 67 U/L (ref 55–135)
ALT SERPL W/O P-5'-P-CCNC: 11 U/L (ref 10–44)
ANION GAP SERPL CALC-SCNC: 6 MMOL/L (ref 8–16)
AST SERPL-CCNC: 17 U/L (ref 10–40)
BASOPHILS # BLD AUTO: 0.03 K/UL (ref 0–0.2)
BASOPHILS NFR BLD: 0.8 % (ref 0–1.9)
BILIRUB SERPL-MCNC: 1.1 MG/DL (ref 0.1–1)
BUN SERPL-MCNC: 15 MG/DL (ref 6–20)
CALCIUM SERPL-MCNC: 9.5 MG/DL (ref 8.7–10.5)
CHLORIDE SERPL-SCNC: 108 MMOL/L (ref 95–110)
CO2 SERPL-SCNC: 27 MMOL/L (ref 23–29)
CREAT SERPL-MCNC: 1.2 MG/DL (ref 0.5–1.4)
DIFFERENTIAL METHOD BLD: ABNORMAL
EOSINOPHIL # BLD AUTO: 0.1 K/UL (ref 0–0.5)
EOSINOPHIL NFR BLD: 3.1 % (ref 0–8)
ERYTHROCYTE [DISTWIDTH] IN BLOOD BY AUTOMATED COUNT: 12.4 % (ref 11.5–14.5)
EST. GFR  (NO RACE VARIABLE): >60 ML/MIN/1.73 M^2
GLUCOSE SERPL-MCNC: 83 MG/DL (ref 70–110)
HCT VFR BLD AUTO: 44 % (ref 40–54)
HGB BLD-MCNC: 15 G/DL (ref 14–18)
IMM GRANULOCYTES # BLD AUTO: 0.01 K/UL (ref 0–0.04)
IMM GRANULOCYTES NFR BLD AUTO: 0.3 % (ref 0–0.5)
LYMPHOCYTES # BLD AUTO: 1.6 K/UL (ref 1–4.8)
LYMPHOCYTES NFR BLD: 42.6 % (ref 18–48)
MCH RBC QN AUTO: 28.7 PG (ref 27–31)
MCHC RBC AUTO-ENTMCNC: 34.1 G/DL (ref 32–36)
MCV RBC AUTO: 84 FL (ref 82–98)
MONOCYTES # BLD AUTO: 0.5 K/UL (ref 0.3–1)
MONOCYTES NFR BLD: 12.3 % (ref 4–15)
NEUTROPHILS # BLD AUTO: 1.6 K/UL (ref 1.8–7.7)
NEUTROPHILS NFR BLD: 40.9 % (ref 38–73)
NRBC BLD-RTO: 0 /100 WBC
PLATELET # BLD AUTO: 134 K/UL (ref 150–450)
PMV BLD AUTO: 9.5 FL (ref 9.2–12.9)
POTASSIUM SERPL-SCNC: 4.2 MMOL/L (ref 3.5–5.1)
PROT SERPL-MCNC: 6.8 G/DL (ref 6–8.4)
RBC # BLD AUTO: 5.22 M/UL (ref 4.6–6.2)
SODIUM SERPL-SCNC: 141 MMOL/L (ref 136–145)
WBC # BLD AUTO: 3.83 K/UL (ref 3.9–12.7)

## 2024-09-12 PROCEDURE — 36415 COLL VENOUS BLD VENIPUNCTURE: CPT | Performed by: INTERNAL MEDICINE

## 2024-09-12 PROCEDURE — 85025 COMPLETE CBC W/AUTO DIFF WBC: CPT | Performed by: INTERNAL MEDICINE

## 2024-09-12 PROCEDURE — 80053 COMPREHEN METABOLIC PANEL: CPT | Performed by: INTERNAL MEDICINE

## 2024-09-12 PROCEDURE — 81404 MOPATH PROCEDURE LEVEL 5: CPT | Performed by: INTERNAL MEDICINE

## 2024-09-12 NOTE — PROGRESS NOTES
Important:    HECTOR ROGERS please refer Arian to hepatology Clinic for evaluation of his low platelets referral placed    Arian nonspecific finding on your CBC of low platelets sometimes this can be just normal sometimes this can be a sign of underlying liver disease I am going to refer you to our liver specialist for further evaluation    Thank you

## 2024-09-18 LAB
MOL DX INTERP BLD/T QL: ABNORMAL
REF LAB TEST METHOD: ABNORMAL
TEST PERFORMANCE INFO SPEC: ABNORMAL
UGT1A1 ADDITIONAL INFORMATION: ABNORMAL
UGT1A1 FULL GENE SEQUENCE RESULT: ABNORMAL
UGT1A1 INTERPRETATION: ABNORMAL
UGT1A1 REVIEWED BY: ABNORMAL
UGT1A1 TA REPEAT RESULT: ABNORMAL

## 2024-09-19 ENCOUNTER — TELEPHONE (OUTPATIENT)
Dept: HEPATOLOGY | Facility: CLINIC | Age: 24
End: 2024-09-19
Payer: COMMERCIAL

## 2024-09-20 NOTE — TELEPHONE ENCOUNTER
I spoke with patient and msg from PA Scheuermann relayed.  Patient scheduled to see NP Dhiraj on 11/13/24; reminder notice mailed.

## 2024-09-20 NOTE — TELEPHONE ENCOUNTER
Pt referred by Dr Jewell due to low plt and potential c/f liver disease    Pls apologize that he has been incorrectly scheduled w/ me  Cancel appt and r/s w/ Gen Hep TRES

## 2024-11-13 ENCOUNTER — PATIENT MESSAGE (OUTPATIENT)
Dept: HEPATOLOGY | Facility: CLINIC | Age: 24
End: 2024-11-13
Payer: COMMERCIAL

## 2024-11-13 ENCOUNTER — OFFICE VISIT (OUTPATIENT)
Dept: HEPATOLOGY | Facility: CLINIC | Age: 24
End: 2024-11-13
Payer: COMMERCIAL

## 2024-11-13 VITALS — WEIGHT: 159.63 LBS | BODY MASS INDEX: 21.16 KG/M2 | HEIGHT: 73 IN

## 2024-11-13 DIAGNOSIS — D69.6 THROMBOCYTOPENIA: Primary | ICD-10-CM

## 2024-11-13 DIAGNOSIS — R17 SERUM TOTAL BILIRUBIN ELEVATED: ICD-10-CM

## 2024-11-13 PROCEDURE — 3008F BODY MASS INDEX DOCD: CPT | Mod: CPTII,S$GLB,,

## 2024-11-13 PROCEDURE — 1159F MED LIST DOCD IN RCRD: CPT | Mod: CPTII,S$GLB,,

## 2024-11-13 PROCEDURE — 1160F RVW MEDS BY RX/DR IN RCRD: CPT | Mod: CPTII,S$GLB,,

## 2024-11-13 PROCEDURE — 99214 OFFICE O/P EST MOD 30 MIN: CPT | Mod: S$GLB,,,

## 2024-11-13 PROCEDURE — 99999 PR PBB SHADOW E&M-EST. PATIENT-LVL IV: CPT | Mod: PBBFAC,,,

## 2024-11-13 NOTE — Clinical Note
Could you please assist this patient with getting US and fibroscan scheduled in [place] on the same day, some time in the next month? Thank you.

## 2024-11-13 NOTE — PROGRESS NOTES
[facility] - New Patient    REFERRING PROVIDER:  Dr. Jeffrey Jewell  PCP: SUSAN Jo MD     Chief Complaint:  thrombocytopenia      HISTORY       HPI: This is a 24 y.o. patient with PMH noted below, presenting for evaluation of thrombocytopenia.    Previous serologic w/u -  negative for hemochromatosis and viral hepatitis    Prior serologic workup:   Lab Results   Component Value Date    FERRITIN 118 07/17/2024    FESATURATED 37 07/17/2024    HEPBSAG Negative 02/08/2018    HEPCAB Negative 02/08/2018    HEPAIGM Negative 02/08/2018         Interval HPI: Presents today alone. States that he was referred by his GI provider for thrombocytopenia. Follows with Dr. Jewell for cyclic vomiting and UC. Has had intermittently low plt since 2015. Denies every seeing hematology regarding his thrombocytopenia. Denies any previous known history of liver disease or liver workup. Denies history of Gilbert's. Denies any use of supplements or OTC medications regularly.        LABS & DIAGNOSTIC STUDIES        Labs done 9/2024 show Tbili mildly elevated, indirect > direct,   AST, ALT wnl, alk phos wnl,  platelets low, Albumin wnl    Lab Results   Component Value Date    ALT 11 09/12/2024    AST 17 09/12/2024    ALKPHOS 67 09/12/2024    BILITOT 1.1 (H) 09/12/2024    ALBUMIN 4.6 09/12/2024     (L) 09/12/2024       Liver fibrosis staging:  -- will obtain fibroscan    Imaging:  CT enterography done 7/2024 noted  FINDINGS:  LUNG BASES: Partially imaged heart and pericardium are within normal limits.  Mild dependent atelectasis at the left lung base.     HEPATOBILIARY: Liver is normal size and attenuation.  Subcentimeter hypodensity along the subcapsular aspect of the right hepatic lobe too small for characterization.  Otherwise, no significant liver abnormality.  Gallbladder surgically absent.  No bile duct dilatation.     SPLEEN: No splenomegaly.  Small accessory splenule at the hilum.     PANCREAS: No focal  "masses or ductal dilatation.     ADRENALS: No adrenal nodules.     KIDNEYS/URETERS: Kidneys enhance symmetrically.  There is a simple cyst in the upper left kidney measuring 3.6 cm, slightly increased in size from prior (previously 2.6 cm).  Few additional subcentimeter hypodensities bilaterally which are too small for characterization.  No solid mass lesions.  Nonobstructive 2 mm renal calculus in the upper left kidney.  No hydronephrosis.     BLADDER/PELVIC ORGANS: No bladder wall thickening.     PERITONEUM / RETROPERITONEUM: No free air or fluid.     LYMPH NODES: No lymphadenopathy.     VESSELS: Unremarkable.     GI TRACT: Stomach appears normal given its nondistended state.  No bowel wall thickening or inflammation.  No obstruction.  The appendix and terminal ileum are normal.     SOFT TISSUES: Unremarkable.     BONES: No fractures or focal osseous lesions.     Impression:     No evidence to suggest inflammatory bowel disease as clinically questioned.     Nonobstructive punctate left kidney stone.        Denies family history of liver disease, Gilbert's disease.    Denies excessive alcohol consumption currently or in the past.       Allergy and medication list reviewed and updated     PMHX:  has a past medical history of Abdominal migraine, Cyclical vomiting with nausea, Eczema, H/O multiple concussions, and Migraines.    PSHX:  has a past surgical history that includes Adenoidectomy; Tonsillectomy; Cholecystectomy (01/2018); Esophagogastroduodenoscopy (N/A, 05/04/2021); and Esophagogastroduodenoscopy (N/A, 7/18/2024).    FAMILY HISTORY: Updated and reviewed in EPIC    ROS:   GENERAL: Denies fatigue  CARDIOVASCULAR: Denies edema  GI: Denies abdominal pain  SKIN: Denies rash, itching   NEURO: Denies confusion, memory loss, or mood changes    PHYSICAL EXAM:   In no acute distress; alert and oriented to person, place and time  VITALS: Ht 6' 1" (1.854 m)   Wt 72.4 kg (159 lb 9.8 oz)   BMI 21.06 kg/m²   EYES: " Sclerae anicteric  GI: Soft, non-tender, non-distended. No ascites.  EXTREMITIES:  No edema.  SKIN: Warm and dry. No jaundice. No telangectasias noted. No palmar erythema.  NEURO:  No asterixis.  PSYCH:  Thought and speech pattern appropriate. Behavior normal        ASSESSMENT & PLAN        EDUCATION:  See instructions discussed with patient in Instructions section of the After Visit Summary     ASSESSMENT & PLAN:  24 y.o. male with:  1. Elevated bilirubin   -- full serological workup to be done to assess for possible causes  -- T bili elevated (range 0.6 to 1.6) since 2015  -- indirect > direct   -- INR and albumin wnl  -- [id number] noted heterozygous - Gilbert's carrier  -- fibroscan to assess for fibrosis  -- US to assess for biliary obstruction    2. Thrombocytopenia  -- intermittently low plates since 2015  -- will obtain fibroscan to assess for fibrosis  -- CT 7/2024 did not note splenomegaly      Follow up in about 3 months (around 2/13/2025). With US & fibroscan before  Orders Placed This Encounter   Procedures    FibroScan Transplant Hepatology(Vibration Controlled Transient Elastography)    US Abdomen Complete        Thank you for allowing me to participate in the care of Arian Farris    HA Henderson, FNP-C  Nurse Practitioner  [facility]  [facility]     I spent a total of 30 minutes on the day of the visit.This includes face to face time and non-face to face time preparing to see the patient (eg, review of tests), obtaining and/or reviewing separately obtained history, documenting clinical information in the electronic or other health record, independently interpreting results and communicating results to the patient/family/caregiver, and coordinating care.         CC'ed note to:   Jeffrey Jewell MD Montgomery, L Lee, MD

## 2024-11-18 ENCOUNTER — TELEPHONE (OUTPATIENT)
Dept: HEPATOLOGY | Facility: CLINIC | Age: 24
End: 2024-11-18
Payer: COMMERCIAL

## 2024-11-18 NOTE — TELEPHONE ENCOUNTER
----- Message from Jono Hearn NP sent at 11/18/2024 11:53 AM CST -----  Please overbook a VV f/u at 10am 1/2/24

## 2024-11-19 ENCOUNTER — HOSPITAL ENCOUNTER (OUTPATIENT)
Dept: RADIOLOGY | Facility: HOSPITAL | Age: 24
Discharge: HOME | End: 2024-11-19
Payer: COMMERCIAL

## 2024-11-19 DIAGNOSIS — D69.6 THROMBOCYTOPENIA: ICD-10-CM

## 2024-11-19 DIAGNOSIS — R17 SERUM TOTAL BILIRUBIN ELEVATED: ICD-10-CM

## 2024-11-19 PROCEDURE — 76700 US EXAM ABDOM COMPLETE: CPT | Mod: 26,,, | Performed by: RADIOLOGY

## 2024-11-19 PROCEDURE — 76700 US EXAM ABDOM COMPLETE: CPT | Mod: TC

## 2024-11-21 ENCOUNTER — PATIENT MESSAGE (OUTPATIENT)
Dept: HEPATOLOGY | Facility: CLINIC | Age: 24
End: 2024-11-21
Payer: COMMERCIAL

## 2024-11-24 PROBLEM — D69.6 THROMBOCYTOPENIA: Status: ACTIVE | Noted: 2024-11-24

## 2024-12-10 ENCOUNTER — PROCEDURE VISIT (OUTPATIENT)
Dept: HEPATOLOGY | Facility: CLINIC | Age: 24
End: 2024-12-10
Payer: COMMERCIAL

## 2024-12-10 VITALS — HEIGHT: 73 IN | WEIGHT: 159.63 LBS | BODY MASS INDEX: 21.16 KG/M2

## 2024-12-10 DIAGNOSIS — R17 SERUM TOTAL BILIRUBIN ELEVATED: ICD-10-CM

## 2024-12-10 DIAGNOSIS — D69.6 THROMBOCYTOPENIA: ICD-10-CM

## 2024-12-10 PROCEDURE — 91200 LIVER ELASTOGRAPHY: CPT | Mod: S$GLB,,, | Performed by: INTERNAL MEDICINE

## 2024-12-10 PROCEDURE — 91200 LIVER ELASTOGRAPHY: CPT | Mod: S$GLB,,,

## 2024-12-10 NOTE — PROCEDURES
FibroScan Transplant Hepatology(Vibration Controlled Transient Elastography)    Date/Time: 12/10/2024 8:30 AM    Performed by: Nica Lee RN  Authorized by: Jono Hearn NP    Diagnosis:  Other    Probe:  M    Universal Protocol: Patient's identity, procedure and site were verified, confirmatory pause was performed.  Discussed procedure including risks and potential complications.  Questions answered.  Patient verbalizes understanding and wishes to proceed with VCTE.     Procedure: After providing explanations of the procedure, patient was placed in the supine position with right arm in maximum abduction to allow optimal exposure of right lateral abdomen.  Patient was briefly assessed, Testing was performed in the mid-axillary location, 50Hz Shear Wave pulses were applied and the resulting Shear Wave and Propagation Speed detected with a 3.5 MHz ultrasonic signal, using the FibroScan probe, Skin to liver capsule distance and liver parenchyma were accessed during the entire examination with the FibroScan probe, Patient was instructed to breathe normally and to abstain from sudden movements during the procedure, allowing for random measurements of liver stiffness. At least 10 Shear Waves were produced, Individual measurements of each Shear Wave were calculated.  Patient tolerated the procedure well with no complications.  Meets discharge criteria as was dismissed.  Rates pain 0 out of 10.  Patient will follow up with ordering provider to review results.    Findings  Median liver stiffness score:  5.8  CAP Reading: dB/m:  212    IQR/med %:  8  Interpretation  Fibrosis interpretation is based on medial liver stiffness - Kilopascal (kPa).    Fibrosis Stage:  F 0-1  Steatosis interpretation is based on controlled attenuation parameter - (dB/m).    Steatosis Grade:  <S1  Comments/Plan:  No steatosis and no fibrosis

## 2025-04-04 ENCOUNTER — OFFICE VISIT (OUTPATIENT)
Dept: INTERNAL MEDICINE | Facility: CLINIC | Age: 25
End: 2025-04-04
Payer: COMMERCIAL

## 2025-04-04 VITALS
HEIGHT: 73 IN | OXYGEN SATURATION: 98 % | HEART RATE: 92 BPM | BODY MASS INDEX: 21.04 KG/M2 | WEIGHT: 158.75 LBS | TEMPERATURE: 98 F | SYSTOLIC BLOOD PRESSURE: 122 MMHG | DIASTOLIC BLOOD PRESSURE: 88 MMHG

## 2025-04-04 DIAGNOSIS — R11.15 CYCLIC VOMITING SYNDROME: Chronic | ICD-10-CM

## 2025-04-04 DIAGNOSIS — G43.009 MIGRAINE WITHOUT AURA AND WITHOUT STATUS MIGRAINOSUS, NOT INTRACTABLE: Primary | Chronic | ICD-10-CM

## 2025-04-04 DIAGNOSIS — H61.21 IMPACTED CERUMEN, RIGHT EAR: ICD-10-CM

## 2025-04-04 DIAGNOSIS — I48.91 LONE ATRIAL FIBRILLATION: Chronic | ICD-10-CM

## 2025-04-04 PROCEDURE — 69210 REMOVE IMPACTED EAR WAX UNI: CPT | Mod: S$GLB,,, | Performed by: FAMILY MEDICINE

## 2025-04-04 PROCEDURE — 99999 PR PBB SHADOW E&M-EST. PATIENT-LVL III: CPT | Mod: PBBFAC,,, | Performed by: FAMILY MEDICINE

## 2025-04-04 PROCEDURE — 1160F RVW MEDS BY RX/DR IN RCRD: CPT | Mod: CPTII,S$GLB,, | Performed by: FAMILY MEDICINE

## 2025-04-04 PROCEDURE — 3079F DIAST BP 80-89 MM HG: CPT | Mod: CPTII,S$GLB,, | Performed by: FAMILY MEDICINE

## 2025-04-04 PROCEDURE — 3074F SYST BP LT 130 MM HG: CPT | Mod: CPTII,S$GLB,, | Performed by: FAMILY MEDICINE

## 2025-04-04 PROCEDURE — 99214 OFFICE O/P EST MOD 30 MIN: CPT | Mod: 25,S$GLB,, | Performed by: FAMILY MEDICINE

## 2025-04-04 PROCEDURE — 1159F MED LIST DOCD IN RCRD: CPT | Mod: CPTII,S$GLB,, | Performed by: FAMILY MEDICINE

## 2025-04-04 PROCEDURE — 3008F BODY MASS INDEX DOCD: CPT | Mod: CPTII,S$GLB,, | Performed by: FAMILY MEDICINE

## 2025-04-04 RX ORDER — SUMATRIPTAN SUCCINATE 25 MG/1
TABLET ORAL
Qty: 9 TABLET | Refills: 2 | Status: SHIPPED | OUTPATIENT
Start: 2025-04-04

## 2025-04-04 NOTE — PROGRESS NOTES
OFFICE VISIT 4/4/25 11:40 AM CDT    CHIEF COMPLAINT: Migraine    History of Present Illness    CHIEF COMPLAINT:  Arian presents today for headaches with sharp pain on the right side of head    HISTORY OF PRESENT ILLNESS:  He reports new onset headaches starting in the frontal temporal area and progressing backwards along the right side of head. These headaches feel different from his previous migraines, describing them more as tension headaches extending to his neck. Initially occurring every morning and persisting until treated, frequency has decreased to 2-3 occurrences in the past week. He manages symptoms with Excedrin, caffeine intake, and electrolyte packets in the morning which provide some relief. In the past 30 days, he has used headache medication approximately 10-15 times. He notes increased work-related stress.    MEDICAL HISTORY:  He has a history of migraines since childhood. He experienced a single episode of atrial fibrillation in 2021 with no recurrence. He also has a history of abdominal migraines/cyclical vomiting syndrome.    PREVIOUS MEDICATIONS:  He was prescribed Topamax for migraines as a child approximately 20 years ago with no noticeable improvement. Imitrex nasal spray provided partial relief of abdominal migraines but did not completely resolve symptoms.       Assessment & Plan    IMPRESSION:   Assessed recurring right-sided headaches, potentially migraine-related.   Evaluated frequency of OTC pain medication use for headache management.    PATIENT EDUCATION:   Explained that earwax buildup is normal and not necessarily related to poor hygiene.    ACTION ITEMS/LIFESTYLE:   Advised against using Q-tips for ear cleaning.   Arian to use soap and water with a washcloth for regular ear cleaning.   Arian to mix white vinegar with an equal amount of water to make a half-strength solution.   Arian to fill ear canal with half-strength white vinegar solution 3 times daily for the next 3  "days to prevent swimmer's ear type infection after wax removal.    MEDICATIONS:   Started Imitrex (sumatriptan) tablets for migraine management. Instructions: Take 1 tablet at onset of migraine; may take 1 more tablet 2 hours later if needed. If 1 tablet followed by another 2 hours later is effective, may take both tablets together at onset for future migraines. May take up to 2 tablets at onset and 1 more 2 hours later if needed.   Discontinued all OTC pain medicines (Excedrin, Tylenol, Ibuprofen) for headache management.    ORDERS:   Performed earwax removal procedure.    FOLLOW UP:   Follow up in 2-3 weeks via video visit to assess effectiveness of Imitrex treatment.   Arian may cancel follow-up visit if headaches resolve completely; instructed to send a message stating "doing great, do not need anything else" in this case.        1. Migraine without aura and without status migrainosus, not intractable  -     sumatriptan (IMITREX) 25 MG Tab; Take 1 tablet (25 mg) by mouth at onset of migraine. May take 1 more tablet 2 hours later.  Dispense: 9 tablet; Refill: 2    2. Lone atrial fibrillation - single episode - RESOLVED  Overview:  He had about 90 minutes of rapid AF in the ED in May 2021 during a presentation for nausea.    Assessment & Plan:  He reports no recurrence since original episode. He had cardiac work-up after the episode and was told no further evaluation or treatment was indicated. He reports no residual symptoms or impairment. This condition appears to be RESOLVED.      3. Cyclic vomiting syndrome  Assessment & Plan:  Much improved on no Tx.      4. Impacted cerumen, right ear         Unless specified otherwise, chronic conditions are represented as and appear to be compensated/controlled and stable.  Today's visit involved the intricate management of episodic problem(s) and the ongoing care for the patient's serious or complex condition(s) listed above, reflecting the inherent complexity of " "providing longitudinal, comprehensive evaluation and management as the central hub for the patient's primary care services.    Except as noted herein, ROS is otherwise negative.    Vitals:    04/04/25 1153   BP: 122/88   BP Location: Left arm   Patient Position: Sitting   Pulse: 92   Temp: 98.3 °F (36.8 °C)   TempSrc: Tympanic   SpO2: 98%   Weight: 72 kg (158 lb 11.7 oz)   Height: 6' 1" (1.854 m)   Physical Exam  Vitals reviewed.   Constitutional:       General: He is not in acute distress.     Appearance: Normal appearance. He is not ill-appearing, toxic-appearing or diaphoretic.   HENT:      Head: Normocephalic and atraumatic.      Right Ear: There is impacted cerumen.      Left Ear: Tympanic membrane, ear canal and external ear normal.   Eyes:      General: No scleral icterus.     Conjunctiva/sclera: Conjunctivae normal.   Neck:      Vascular: No carotid bruit.   Cardiovascular:      Rate and Rhythm: Normal rate and regular rhythm.      Heart sounds: Normal heart sounds.   Pulmonary:      Effort: Pulmonary effort is normal.      Breath sounds: Normal breath sounds.   Abdominal:      General: Bowel sounds are normal. There is no distension.      Palpations: Abdomen is soft. There is no mass.      Tenderness: There is no abdominal tenderness.   Musculoskeletal:         General: No tenderness.      Cervical back: No muscular tenderness.   Lymphadenopathy:      Cervical: No cervical adenopathy.   Skin:     General: Skin is warm and dry.      Coloration: Skin is not jaundiced.   Neurological:      General: No focal deficit present.      Mental Status: He is alert and oriented to person, place, and time.      Cranial Nerves: No cranial nerve deficit.      Gait: Gait normal.   Psychiatric:         Mood and Affect: Mood normal.         Behavior: Behavior normal.         Judgment: Judgment normal.       Documentation entered by me for this encounter may have been done in part using speech-recognition technology. Although " "I have made an effort to ensure accuracy, "sound like" errors may exist and should be interpreted in context.    Follow up in about 19 days (around 4/23/2025) for virtual visit, w/ me.      PROCEDURE NOTE    *PROCEDURE: Cerumen Disimpaction, RIGHT  *SURGEON: THIERRY Jo MD  *DIAGNOSIS: Cerumen impaction, RIGHT  *INFORMED CONSENT:  After discussing diagnosis, nature and risks/benefits of procedure, and treatment alternatives, informed consent was obtained verbally.    *PRE-PROCEDURE SAFETY CHECK:  (1) Patient was patient identified by at least 2 means.  (2) Planned procedure was confirmed.  (3) Correct site was verified.  (4) Anticipated needed supplies were verified as available.    *PROCEDURE DESCRIPTION: Cerumen disimpaction was performed using disposable curette.    *POST-PROCEDURE EXAM: Canals unobstructed. Visualized tympanic membranes intact.    *COMPLICATIONS: none    Ear hygiene education provided.   "

## 2025-04-04 NOTE — PATIENT INSTRUCTIONS
"EAR CARE AFTER CERUMEN REMOVAL  Mix white vinegar with equal amount of water to make half-strength white vinegar.  Fill ear canal with half-strength white vinegar 3 times a day for the next 3 days to prevent a "swimmer's ear" type infection.    "

## 2025-04-04 NOTE — ASSESSMENT & PLAN NOTE
He reports no recurrence since original episode. He had cardiac work-up after the episode and was told no further evaluation or treatment was indicated. He reports no residual symptoms or impairment. This condition appears to be RESOLVED.

## 2025-04-07 ENCOUNTER — PATIENT MESSAGE (OUTPATIENT)
Dept: INTERNAL MEDICINE | Facility: CLINIC | Age: 25
End: 2025-04-07
Payer: COMMERCIAL

## 2025-04-09 ENCOUNTER — E-VISIT (OUTPATIENT)
Dept: INTERNAL MEDICINE | Facility: CLINIC | Age: 25
End: 2025-04-09
Payer: COMMERCIAL

## 2025-04-09 DIAGNOSIS — B02.9 HERPES ZOSTER WITHOUT COMPLICATION: Primary | Chronic | ICD-10-CM

## 2025-04-09 DIAGNOSIS — R21 RASH: Primary | ICD-10-CM

## 2025-04-09 NOTE — PROGRESS NOTES
Request for Dermatology E-Consult     Patient Name: Arian Farris  (25 y.o.)  MRN: [id number]  Requesting Provider: SUSAN Jo MD   Primary Care Provider: SUSAN Jo MD     Consent for e-consult has been obtained from patient, and patient is aware of applicable cost: Yes    Reason for Consult: Rash    Brief History: New circular rash on the right upper forehead, starting 4/5/2025. Rash is raised, solid, and red. Pain is tender, rated 4/10, persistent, located on the upper right forehead. Rash size increasing gradually with no itch, discharge, or bleeding. Initially noticed as circular dots like pimples, becoming a raised, red patch, painful and stinging with head movement. OTC creams and moisturizers used without relief. No additional symptoms or exposures reported.    SPECIFIC QUESTIONS: Diagnosis? Recommended treatment?    Total time spent preparing for this E-consult and/or communicating with the consulting physician was greater than or equal to: 17 minutes. This time was spent personally by me on the following activities: review of patient's past medical history, review of any interval history, review of current medication list and medication history, review of problem list, review of my last progress note, communicating message to patient, and entering information for E-consult order. This time was exclusive of any separately billable procedures or separate E&M services for this patient and exclusive of time spent treating any other patients.

## 2025-04-09 NOTE — PROGRESS NOTES
Patient ID: Arian Farris is a 25 y.o. male.    Chief Complaint: General Illness (Entered automatically based on patient selection in [employer].)    The patient initiated a request through [employer] on 4/9/2025 for evaluation and management with a chief complaint of General Illness (Entered automatically based on patient selection in [employer].)     I evaluated the questionnaire submission on 04/09/2025.    [facility] Evisit [facility]-Common Problems    4/9/2025 11:44 AM CDT - Filed by Patient   What do you need help with? Rash   Do you agree to participate in an E-Visit? Yes   If you have any of the following symptoms, please present to your local emergency room or call 911:  I acknowledge   What is the main issue you would like addressed today? Circular rash on forehead   How would you describe your skin concern? Rash   When did your concern begin? 4/5/2025   Where is your skin concern located? Face   Does the affected area itch? No   Does the affected area hurt? Yes   Where is the pain located? On the affected area   When did the pain start? Gradually   What best describes your pain? Tender   How often do you feel pain in the affected area? Always   Please select the face that most closely captures your pain level: 4   Does the affected area have discharge or drainage? No   Have you noticed any bleeding in the affected area? No   How would you describe your skin concern? Raised;  Solid or firm   How would you describe the color of the affected area(s)? Red   How has the affected area changed over time? Increased in size   How often do you have this skin concern? New problem   How long does your skin problem last? Always   Have you been exposed to any of the following? None   Have you used any of the following to treat your skin concern? Over-the-counter cream or ointment;  Moisturizer or lotion   If you have used anything for treatment, has it helped the symptoms? No   Do you have any of the following additional  "symptoms with your skin concern? None   Provide any additional information you feel is important. Noticed on saturday, thought it was a couple of pimples because it was a circular cluster of dots. Then sunday, it turned into a circular red patch that became raised. Now its slightly painful to touch and stings when i move my head.   At least one photo is required for treatment to be provided. You can upload a maximum of three photos of the affected area.     Are you able to take your vital signs? No       Current Outpatient Medications   Medication    sumatriptan (IMITREX) 25 MG Tab     No current facility-administered medications for this visit.      Estimated body mass index is 20.94 kg/m² as calculated from the following:    Height as of 4/4/25: 6' 1" (1.854 m).    Weight as of 4/4/25: 72 kg (158 lb 11.7 oz).    There are no discontinued medications.       Future Appointments   Date Time Provider Department Center   4/22/2025  4:00 PM SUSAN Jo MD [facility]    Arian Nieves.    Thanks so much for reaching out and describing your symptoms in such clear detail--that really helps me get a sense of what youre dealing with. I know having a painful, persistent rash on your forehead can be worrisome, especially when its not responding to over-the-counter treatments.    Based on your description, I suspect this might be a viral rash, but Im not completely certain. Because I want to be thorough and make sure you get the right care, Im requesting an E-consult with dermatology.    An E-consult is a specialists review of a patients history and test results, providing an assessment and recommendations. E-consults are quicker and more affordable than in-office consultations. Responses typically arrive within five business days. With many insurances, patients incur no out-of-pocket cost, though a co-pay may apply if required by your plan. Based on our earlier messaging, I will assume I have your permission " "to proceed with the E-consult.    Once I hear back from the dermatology team, Ill share their thoughts and suggestions with you right away.    Best,    Dr. HILARIO BOYD - We want to ensure you have quick and easy access to medical guidance and care when you need it most.  [facility]: If you are experiencing concerning symptoms and are unsure if you need immediate medical care, call [facility] at [phone] to connect to one of our excellent triage nurses who can advise you.  On-Demand Urgent Care Virtual Video Visit: If you need advice from a healthcare provider ASAP, [facility] visit is an on-demand urgent care virtual video visit available 24/7. To get started, select "Urgent Care Virtual Visit" in the "Find Care" section of the [facility] Main Menu or click HERE. [email or web address]    ADDENDUM 04/11/2025 8:39 AM    Ezequiel Don ,    I wanted to let you know that I consulted a dermatologist through an E-consult to get their opinion on your rash. This means I shared your history and test results with a specialist, and they provided an expert assessment without requiring a separate appointment.    Based on the description of pain and the way the rash looks--especially the presence of small fluid-filled blisters that resemble dew drops on a ashley petal--the dermatologist suspects either shingles or cutaneous herpes. These two conditions can look very similar, and the best way to tell them apart is with a viral PCR swab test. Since the rash started about five days ago, the specialist mentioned that starting Valtrex (an antiviral medication) now may not be very effective, but said it would still be reasonable to consider a one-week course at 1 gram three times daily if we choose to treat.    For symptom relief, the dermatologist recommended trying a 5% lidocaine ointment, which is a topical numbing medication that can help with pain or tenderness. They " also suggested using Domeboro soaks. These are over-the-counter compresses that help relieve itching, reduce irritation, and draw out moisture from the blisters. You can find them in the wound care aisle of most pharmacies. Heres how to use them:    Domeboro Soak Instructions:  1. Wash your hands.  2. Put warm water in a clean container.  3. Add the Domeboro tablet or powder and stir until it dissolves.  4. Pour the solution into a clean bowl.  5. Soak a clean cloth, paper towel, or gauze in the solution and apply it to the rash for 10-15 minutes.  6. Repeat this 3 times a day.    I also appreciate you letting me know that you had a PCR test for HSV done at your lab, and that it came back negative. That makes a herpes (HSV) infection less likely and supports the idea that shingles is the more likely cause of your rash. Unfortunately, since varicella (the virus that causes shingles) couldnt be tested, we have to rely on the clinical appearance and symptoms for guidance.    At this point, I dont think starting Valtrex would make much of a difference. However, if the rash is still painful or uncomfortable, I can order the lidocaine ointment for you. Just let me know.    Best regards,    Dr. RICH    ADDENDUM 04/11/2025 4:02 PM    Dear Arian,    Thanks for your update and for confirming the shingles diagnosis.    I did try to prescribe the lidocaine 5% ointment, but your insurance currently requires a prior authorization for it. That process can take up to three days and almost certainly wouldnt be completed before Monday. I didnt want to delay your relief.    Instead, Reuben prescribed a combination cream--lidocaine 3% with hydrocortisone 0.5%--which your insurance covers without requiring prior authorization. At this stage in your shingles, its safe to use a low-dose steroid like hydrocortisone, and it may help reduce inflammation while also providing good numbing relief. In fact, this cream may end up being more  effective than the lidocaine 5% ointment alone.    Reuben sent the prescription to [facility] as you requested. Also, I recommend checking [employer] (https://www.[email or web address]/lidocaine-hydrocortisone) to compare prices. Surprisingly, this medication may be cheaper with a GoodRx coupon than through insurance.    Please let me know how things go or if you need anything else.    Best regards,  Dr. RICH    Medications Ordered This Encounter   Medications    LIDOcaine HCl-hydrocortison ac (LIDAMANTLE-HC) 3-0.5 % topical cream     Sig: Apply small amount to shingles rash three times a day as needed for pain     Dispense:  28.35 g     Refill:  0      E-Visit Time Tracking:  Day 1 Time (in minutes): 5  Day 3 Time (in minutes): 17    Total Time (in minutes): 22

## 2025-04-10 ENCOUNTER — E-CONSULT (OUTPATIENT)
Dept: DERMATOLOGY | Facility: CLINIC | Age: 25
End: 2025-04-10
Payer: COMMERCIAL

## 2025-04-10 DIAGNOSIS — L98.9 DERMATOSIS: Primary | ICD-10-CM

## 2025-04-10 NOTE — CONSULTS
"[facility] Dermatology 4th Floor  Response for E-Consult     Patient Name: Arian Farris  MRN: [id number]  Primary Care Provider: SUSAN Jo MD   Requesting Provider: SUSAN Jo MD  E-Consult to Dermatology  Consult performed by: China Hewitt MD  Consult ordered by: SUSAN Jo MD  Reason for consult: suspect shingles vs. cutaneous herpes  Assessment/Recommendations: Given description of pain and clinical appearance of "dew drop on a ashley petal" with potential small vesicles noted within the lesion on photograph, suspect either shingles or cutaneous herpes which can have similar appearnace  (would need viral PCR swab to distinguish).  Given rash began 5 days ago, valtrex may not be as effective.  However could consider start of 1 g t.i.d. for 1 week.  Can also use topical anesthetic such as 5% lidocaine ointment for tenderness/pain and consider use of over-the-counter Domeboro soaks (can be purchased in the wound care aisle, see instructions below) to act as a drawing salve for the vesicles.    Domeboro Soak   Domeboro soaks (compresses) are used to relieve itching, minor skin irritation, to help weeping skin and to loosen up crusted areas on the skin. These soaks can be purchased over-the-counter from the pharmacy. It comes as either a tablet or powder and needs to be mixed with water.     Mix the Solution:   1) Wash your hands.   2) Put warm water in a clean container.   3) Add the Domeboro medicine.   4) Stir the solution until the tablet or powder dissolves.     Apply the Domeboro soak:   1) Pour Domeboro solution from the large container into a clean bowl.  2) Prepare the patient for the soak by placing the area to be treated over a towel or plastic sheet.   3) Wash your hands.   4) Place a clean cloth towel, paper towel or gauze into the domeboro's solution. Soak the affected area with the domeboro's cloth for 10-15 minutes.   5) Apply the soak for 10-15 minutes to the affected " area 3 times a day.                  Recommendation: see above    Additional future steps to consider:  Follow up with Dermatology if spreads or fails to improve    Total time of Consultation: 5 minute    I did not speak to the requesting provider verbally about this.     *This eConsult is based on the clinical data available to me and is furnished without benefit of a physical examination. The eConsult will need to be interpreted in light of any clinical issues or changes in patient status not available to me at the time of filing this eConsults. Significant changes in patient condition or level of acuity should result in immediate formal consultation and reevaluation. Please alert me if you have further questions.    Thank you for this eConsult referral.     China Hewitt MD  [facility] Dermatology 4th Floor

## 2025-04-11 RX ORDER — LIDOCAINE HYDROCHLORIDE AND HYDROCORTISONE ACETATE 30; 5 MG/G; MG/G
CREAM TOPICAL
Qty: 28.35 G | Refills: 0 | Status: SHIPPED | OUTPATIENT
Start: 2025-04-11

## 2025-04-11 NOTE — ADDENDUM NOTE
Addended by: SUSAN MASTERS on: 4/11/2025 04:07 PM     Modules accepted: Orders, Level of Service

## 2025-04-13 PROBLEM — K51.00 ULCERATIVE (CHRONIC) PANCOLITIS WITHOUT COMPLICATIONS: Status: RESOLVED | Noted: 2024-07-10 | Resolved: 2025-04-13

## 2025-06-16 ENCOUNTER — PATIENT MESSAGE (OUTPATIENT)
Dept: INTERNAL MEDICINE | Facility: CLINIC | Age: 25
End: 2025-06-16
Payer: COMMERCIAL

## 2025-06-16 DIAGNOSIS — R11.2 NAUSEA AND VOMITING, UNSPECIFIED VOMITING TYPE: ICD-10-CM

## 2025-06-16 DIAGNOSIS — G43.009 MIGRAINE WITHOUT AURA AND WITHOUT STATUS MIGRAINOSUS, NOT INTRACTABLE: Chronic | ICD-10-CM

## 2025-06-16 DIAGNOSIS — R10.9 ABDOMINAL PAIN, UNSPECIFIED ABDOMINAL LOCATION: ICD-10-CM

## 2025-06-16 NOTE — TELEPHONE ENCOUNTER
Refill Routing Note   Medication(s) are not appropriate for processing by [facility] for the following reason(s):        Outside of protocol    [facility] action(s):  Route        Medication Therapy Plan: patient requesting refill      Appointments  past 12m or future 3m with PCP    Date Provider   Last Visit   4/9/2025 SUSAN Jo MD   Next Visit   Visit date not found SUSAN Jo MD   ED visits in past 90 days: 0        Note composed:9:55 AM 06/16/2025

## 2025-06-16 NOTE — TELEPHONE ENCOUNTER
No care due was identified.  [facility] Embedded Care Due Messages. Reference number: [id number].   6/16/2025 8:12:33 AM CDT

## 2025-06-16 NOTE — TELEPHONE ENCOUNTER
Refill Routing Note   Medication(s) are not appropriate for processing by [facility] for the following reason(s):        New or recently adjusted medication    [facility] action(s):  Defer               Appointments  past 12m or future 3m with PCP    Date Provider   Last Visit   4/9/2025 SUSAN Jo MD   Next Visit   Visit date not found SUSAN Jo MD   ED visits in past 90 days: 0        Note composed:12:43 PM 06/16/2025

## 2025-06-16 NOTE — TELEPHONE ENCOUNTER
No care due was identified.  [facility] Embedded Care Due Messages. Reference number: [id number].   6/16/2025 9:42:20 AM CDT

## 2025-06-17 RX ORDER — SUMATRIPTAN SUCCINATE 25 MG/1
TABLET ORAL
Qty: 9 TABLET | Refills: 2 | Status: SHIPPED | OUTPATIENT
Start: 2025-06-17

## 2025-06-17 RX ORDER — ONDANSETRON 4 MG/1
4 TABLET, ORALLY DISINTEGRATING ORAL EVERY 12 HOURS PRN
Qty: 30 TABLET | Refills: 3 | Status: SHIPPED | OUTPATIENT
Start: 2025-06-17

## 2025-06-17 NOTE — TELEPHONE ENCOUNTER
REFILL REQUEST APPROVED  Requested Prescriptions     Pending Prescriptions Disp Refills    ondansetron (ZOFRAN-ODT) 4 MG TbDL 30 tablet 0     Sig: Take 1 tablet (4 mg total) by mouth every 12 (twelve) hours as needed (Nausea and vomiting).

## 2025-06-17 NOTE — TELEPHONE ENCOUNTER
REFILL REQUEST APPROVED.  Requested Prescriptions     Pending Prescriptions Disp Refills    sumatriptan (IMITREX) 25 MG Tab 9 tablet 2     Sig: Take 1 tablet (25 mg) by mouth at onset of migraine. May take 1 more tablet 2 hours later.

## (undated) DEVICE — BANDAGE ADHESIVE

## (undated) DEVICE — IRRIGATOR ENDOSCOPY DISP.

## (undated) DEVICE — SUT GUT PL. 4-0 27 FS-2

## (undated) DEVICE — STRIP STERI 1/8 X 3

## (undated) DEVICE — TUBING HF INSUFFLATION W/ FLTR

## (undated) DEVICE — BLADE SURG CARBON STEEL SZ11

## (undated) DEVICE — NDL HYPO REG 25G X 1 1/2

## (undated) DEVICE — TRAY MINOR GEN SURG

## (undated) DEVICE — SOL NS 1000CC

## (undated) DEVICE — BAG TISS RETRV MONARCH 10MM

## (undated) DEVICE — CLIP HEMO-LOK ML

## (undated) DEVICE — Device

## (undated) DEVICE — SUT PROLENE 2-0 KS BL MONO

## (undated) DEVICE — TROCAR ENDOPATH XCEL 11MM 10CM

## (undated) DEVICE — COVER LIGHT HANDLE

## (undated) DEVICE — SUT 0 VICRYL / UR6 (J603)

## (undated) DEVICE — TROCAR ENDOPATH XCEL 5MM 7.5CM

## (undated) DEVICE — SCISSOR 5MMX35CM DIRECT DRIVE

## (undated) DEVICE — ELECTRODE REM PLYHSV RETURN 9

## (undated) DEVICE — SEE MEDLINE ITEM 157128